# Patient Record
Sex: FEMALE | Race: WHITE | Employment: OTHER | URBAN - NONMETROPOLITAN AREA
[De-identification: names, ages, dates, MRNs, and addresses within clinical notes are randomized per-mention and may not be internally consistent; named-entity substitution may affect disease eponyms.]

---

## 2021-08-26 ENCOUNTER — OFFICE VISIT (OUTPATIENT)
Dept: PRIMARY CARE CLINIC | Age: 61
End: 2021-08-26
Payer: COMMERCIAL

## 2021-08-26 DIAGNOSIS — M25.561 ACUTE PAIN OF RIGHT KNEE: Primary | ICD-10-CM

## 2021-08-26 PROCEDURE — 73562 X-RAY EXAM OF KNEE 3: CPT | Performed by: STUDENT IN AN ORGANIZED HEALTH CARE EDUCATION/TRAINING PROGRAM

## 2021-08-26 PROCEDURE — 99204 OFFICE O/P NEW MOD 45 MIN: CPT | Performed by: STUDENT IN AN ORGANIZED HEALTH CARE EDUCATION/TRAINING PROGRAM

## 2021-08-26 RX ORDER — METHYLPREDNISOLONE 4 MG/1
TABLET ORAL
Qty: 1 KIT | Refills: 0 | Status: SHIPPED | OUTPATIENT
Start: 2021-08-26 | End: 2021-09-01

## 2021-08-26 NOTE — PROGRESS NOTES
No chief complaint on file. HPI:      Kathy Marie is a 64 y.o. female who presents for evaluation of acute right knee pain. Her pain started on 8/15/2021 when she was getting out of her recliner. She pushed down the recliner with her right leg and developed a sudden sharp pain behind the knee. The pain was present with walking but didn't really get better or worse until Monday when she walked up the steps. It got particularly bad yesterday and she attributes this to standing all day on Tuesday jose j vegetables. Over the last couple days the knee itself is become very swollen and just starting today it is hurting her enough that she has had a hard time walking and is using a wheelchair in clinic. She has tried ice, ibuprofen, Voltaren gel - none of which have helped. She has never had pain like this before and she denies a history of gout. She denies shortness of breath or chest pain or peripheral edema noted the swelling is exclusively around the knee. No past medical history on file. Medication  Current Outpatient Medications   Medication Sig Dispense Refill    methylPREDNISolone (MEDROL DOSEPACK) 4 MG tablet Take by mouth. 1 kit 0    amLODIPine (NORVASC) 5 MG tablet       valsartan (DIOVAN) 160 MG tablet        No current facility-administered medications for this visit. Allergies  No Known Allergies    Review of Systems:  Pertinent items are noted in HPI. Physical Examination: This is a pleasant female, alert, and in no acute distress. There were no vitals filed for this visit. Right knee exam: Medial joint line tenderness as well as posterior knee pain. Moderate effusion present without erythema or breaks in the skin. Limping when walking using wheelchair today. Anterior knee pain with resisted extension but negative leg test.  Posterior pain over the hamstrings with resisted knee flexion. Pain was also worse when attempting to straighten the leg.   Lacks about 20° of knee extension and 45° of flexion. Right knee does not appear to be warmer than left knee. No peripheral edema. Right lower extremity is smaller in circumference than left. Vascular exam: Extremities warm and well perfused, no significant edema    Respiratory exam: Breathing easy and unlabored    Neuro exam: No focal neuro deficits    Lymphatic: No obvious lymphadenopathy    Skin: Warm, dry    Radiology:     Six view X-rays of the right knee dated 8/26/2021 were reviewed independently today and discussed with the patient. The films revealed: No acute osseous abnormalities. Some mild to moderate anterior compartment osteoarthritis, mild osteoarthritis elsewhere. Assessment and Plan:     1. Acute pain of right knee  Acute knee pain without previous chronic knee pain. Pain started proximally 1 week ago but then suddenly worsened again a couple of days ago. While her symptoms did initially sound like a hamstring injury her symptoms now seem more consistent with a meniscus tear. She has a lot of joint and is unable to bear weight on her right leg without significant pain. In addition she has a positive Roe and joint line tenderness and a joint effusion which suggests intra-articular pathology specifically a meniscus tear. Because of this I will get an MRI to evaluate and determine the extent of the tear. To help control her pain in the meantime I will start her on a Medrol Dosepak to help reduce the inflammation and pain. We discussed the potential side effects of steroids including hyperactivity, gastritis, and what to look out for including things as extreme as GI bleeding.  - XR KNEE RIGHT (MIN 4 VIEWS)  - methylPREDNISolone (MEDROL DOSEPACK) 4 MG tablet; Take by mouth. Dispense: 1 kit; Refill: 0  - MRI KNEE RIGHT WO CONTRAST; Future    Follow-up: After MRI. Sooner with any problems, questions, concerns, or worsening symptoms.     Mi Pruitt MD  Primary Care Sports Medicine    Please note that this chart was at least partially generated using dragon dictation software. Although every effort was made to ensure the accuracy of this automated transcription, some errors in transcription may have occurred.

## 2021-09-07 ENCOUNTER — OFFICE VISIT (OUTPATIENT)
Dept: PRIMARY CARE CLINIC | Age: 61
End: 2021-09-07
Payer: COMMERCIAL

## 2021-09-07 VITALS — HEIGHT: 69 IN | BODY MASS INDEX: 34.07 KG/M2 | WEIGHT: 230 LBS

## 2021-09-07 DIAGNOSIS — S83.241D ACUTE MEDIAL MENISCUS TEAR OF RIGHT KNEE, SUBSEQUENT ENCOUNTER: Primary | ICD-10-CM

## 2021-09-07 PROCEDURE — 99214 OFFICE O/P EST MOD 30 MIN: CPT | Performed by: STUDENT IN AN ORGANIZED HEALTH CARE EDUCATION/TRAINING PROGRAM

## 2021-09-07 NOTE — PROGRESS NOTES
Chief Complaint   Patient presents with    Follow-up     MRI done on 09/01       HPI:      Maricarmen Saxena is a 64 y.o. female who presents for follow-up evaluation of right knee pain. Her knee pain initially started 8/15/2021 and at our last visit about a week ago she was having severe pain so we gave her a steroid Dosepak and due to concern for a meniscus tear we obtained an MRI which she comes in to discuss today. Overall she says her pain is quite a bit better than it was before and is down to 3/10 as opposed to the nearly 10/10 it was at her last visit. However, she is having to avoid going up stairs and is easily becoming inflamed and painful and making it difficult for her to perform her daily activities. No past medical history on file. Medication  Current Outpatient Medications   Medication Sig Dispense Refill    amLODIPine (NORVASC) 5 MG tablet       valsartan (DIOVAN) 160 MG tablet        No current facility-administered medications for this visit. Allergies  No Known Allergies    Review of Systems:  Pertinent items are noted in HPI. Physical Examination: This is a pleasant female, alert, and in no acute distress. Vitals:    09/07/21 0902   Weight: 230 lb (104.3 kg)   Height: 5' 9\" (1.753 m)       Right knee exam: Moderate effusion, medial joint line tenderness, positive Roe    Vascular exam: Extremities warm and well perfused, no significant edema    Respiratory exam: Breathing easy and unlabored    Neuro exam: No focal neuro deficits    Lymphatic: No obvious lymphadenopathy    Skin: Warm, dry    Radiology:     MRI of the right knee dated 9/1/2021 were reviewed and interpreted independently today and discussed with the patient. The films revealed: 6 mm radial tear of the medial meniscus involving the posterior root with meniscal extrusion. Grade 3 femorotibial and patellofemoral chondromalacia. 2-3 small osseous bodies within popliteus bursa. Assessment and Plan:     1.  Acute medial meniscus tear of right knee, subsequent encounter  That he has an acute large radial tear of the medial meniscus involving the posterior root with meniscal extrusion. These findings put her at risk for further worsening of her meniscal tear leading to early osteoarthritis without intervention. We discussed these findings together today and the importance of early intervention. I discussed the case with Dr. Sloan Ware and we will get her scheduled with him tomorrow to discuss surgical options. Follow-up: With Dr. Sloan Ware. Sooner with any problems, questions, concerns, or worsening symptoms. Mi Pruitt MD  Primary Care Sports Medicine    Please note that this chart was at least partially generated using dragon dictation software. Although every effort was made to ensure the accuracy of this automated transcription, some errors in transcription may have occurred.

## 2021-09-08 ENCOUNTER — OFFICE VISIT (OUTPATIENT)
Dept: ORTHOPEDIC SURGERY | Age: 61
End: 2021-09-08
Payer: COMMERCIAL

## 2021-09-08 ENCOUNTER — TELEPHONE (OUTPATIENT)
Dept: ORTHOPEDIC SURGERY | Age: 61
End: 2021-09-08

## 2021-09-08 VITALS — HEIGHT: 69 IN | WEIGHT: 230 LBS | BODY MASS INDEX: 34.07 KG/M2

## 2021-09-08 DIAGNOSIS — S83.241A ACUTE MEDIAL MENISCUS TEAR OF RIGHT KNEE, INITIAL ENCOUNTER: Primary | ICD-10-CM

## 2021-09-08 PROCEDURE — G8417 CALC BMI ABV UP PARAM F/U: HCPCS | Performed by: ORTHOPAEDIC SURGERY

## 2021-09-08 PROCEDURE — 99215 OFFICE O/P EST HI 40 MIN: CPT | Performed by: ORTHOPAEDIC SURGERY

## 2021-09-08 PROCEDURE — G8427 DOCREV CUR MEDS BY ELIG CLIN: HCPCS | Performed by: ORTHOPAEDIC SURGERY

## 2021-09-08 PROCEDURE — 3017F COLORECTAL CA SCREEN DOC REV: CPT | Performed by: ORTHOPAEDIC SURGERY

## 2021-09-08 PROCEDURE — L1832 KO ADJ JNT POS R SUP PRE CST: HCPCS | Performed by: ORTHOPAEDIC SURGERY

## 2021-09-08 PROCEDURE — 1036F TOBACCO NON-USER: CPT | Performed by: ORTHOPAEDIC SURGERY

## 2021-09-08 NOTE — PROGRESS NOTES
9/8/2021     Reason for visit:  Right knee pain following injury    History of Present Illness: The patient is a 70-year-old female who presents for evaluation of her right knee. She reports injured herself on 8/15/2021. At the time she was getting out of a recliner chair when she felt a popping sensation deep within the knee. This was followed by significant pain and difficulty bearing weight. Ever since then she has had persistent posterior medial based pain. She has difficulty with standing, walking, stairs. Occasional swelling. Occasional catching sensation. Medical History:  No past medical history on file. No past surgical history on file. No family history on file. Social History     Socioeconomic History    Marital status:      Spouse name: Not on file    Number of children: Not on file    Years of education: Not on file    Highest education level: Not on file   Occupational History    Not on file   Tobacco Use    Smoking status: Never Smoker    Smokeless tobacco: Never Used   Substance and Sexual Activity    Alcohol use: Not on file    Drug use: Not on file    Sexual activity: Not on file   Other Topics Concern    Not on file   Social History Narrative    Not on file     Social Determinants of Health     Financial Resource Strain:     Difficulty of Paying Living Expenses:    Food Insecurity:     Worried About Running Out of Food in the Last Year:     [address] in the Last Year:    Transportation Needs:     Lack of Transportation (Medical):      Lack of Transportation (Non-Medical):    Physical Activity:     Days of Exercise per Week:     Minutes of Exercise per Session:    Stress:     Feeling of Stress :    Social Connections:     Frequency of Communication with Friends and Family:     Frequency of Social Gatherings with Friends and Family:     Attends [employer] Services:     Active Member of Clubs or Organizations:     Attends Club or Organization Meetings:     Marital Status:    Intimate Partner Violence:     Fear of Current or Ex-Partner:     Emotionally Abused:     Physically Abused:     Sexually Abused:       Current Outpatient Medications on File Prior to Visit   Medication Sig Dispense Refill    amLODIPine (NORVASC) 5 MG tablet       valsartan (DIOVAN) 160 MG tablet        No current facility-administered medications on file prior to visit. Allergies   Allergen Reactions    Molds & Smuts         Review of Systems:  Constitutional: Patient is adequately groomed with no evidence of malnutrition  Mental Status: The patient is oriented to time, place and person. The patient's mood and affect are appropriate. Lymphatic: The lymphatic examination bilaterally reveals all areas to be without enlargement or induration. Vascular: Examination reveals no swelling or calf tenderness. Peripheral pulses are palpable and 2+. Neurological: The patient has good coordination. There is no weakness or sensory deficit. Skin:  Head/Neck: inspection reveals no rashes, ulcerations or lesions. Trunk: inspection reveals no rashes, ulcerations or lesions. Objective:  Ht 5' 9\" (1.753 m)   Wt 230 lb (104.3 kg)   BMI 33.97 kg/m²      Physical Exam:  The patient is well-appearing and in no apparent distress  Examination of the right knee   There is a small effusion, no gross deformity or skin changes  Range of motion reveals 0 to 130  neg lachman, negative posterior drawer, no pain or laxity with varus or valgus stress at 0 degrees and 30 degrees of flexion  + medial joint line tenderness  5 out of 5 strength throughout distal muscle groups  Sensation is intact to light touch throughout all distributions  There is no calf swelling or tenderness  Palpable DP pulse, brisk cap refill, 2+ symmetric reflexes    Imaging:  X-rays of the right knee were reviewed. There is no fracture, dislocation, or other abnormality. Joint spaces well-preserved.   MRI of the right knee was reviewed. There is a complete radial tear of the posterior medial meniscus root attachment. 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 Assessment:  Right knee posterior medial meniscus root tear sustained on 8/15/2021    Plan:  I had a very long discussion with the patient. Lowell Wren spent time reviewing the imaging findings.  She has a very complex constellation of findings on imaging. I did tell the patient that meniscus root tears have been recently found to be the equivalent of complete meniscal deficiency.  It does put him at increased risk for accelerated degeneration of the knee.  With that being said treatment options at this point include continued nonoperative management versus surgical intervention.  From a surgical standpoint we could consider right knee arthroscopy and posterior medial meniscus root repair.  Whether or not repair is possible depends on the tissue quality of the meniscus but also the extensive degeneration of the medial compartment.  If she is found to have widespread diffuse advanced degeneration that I would not advocate for repair. Bridger Brown he if she does have mild partial thickness early chondral degeneration then we could certainly proceed with repair in order to prevent further degeneration of the knee.  The patient would be nonweightbearing for a total of 6 weeks following surgery.  We thoroughly discussed the risk and benefits associate the procedure.  The risk were defined as not limited to infection, bleeding, damage to blood vessels or nerves, need for additional surgery.  The patient does understand elects proceed.   Lastly, I did reiterate that the MRI does reveal some chondral thinning however the x-rays do not demonstrate any evidence of joint space narrowing and therefore I think there is a high likelihood that this will be repairable and there was still be chondral surface to preserve with the repair.        Greater than 40 minutes were spent with this encounter. Time spent included evaluating the patient's chart prior to arrival.  Evaluating the patient in the office including history, physical examination, imaging reviewing, and counseling on next steps. Lastly, time was spent discussing orders with my staff as well as providing documentation in the chart. Teresa Lux MD            Orthopaedic Surgery Sports Medicine and [address] [facility] and [address] [facility] Physician [facility] ([address])      Disclaimer: This note was dictated with voice recognition software. Though review and correction are routine, we apologize for any errors.

## 2021-09-08 NOTE — TELEPHONE ENCOUNTER
MRI REPORT IS NEEDED TO BE SCANNED INTO [facility] BEFORE PRECERT FOR SURGERY CAN BE STARTED

## 2021-09-09 ENCOUNTER — TELEPHONE (OUTPATIENT)
Dept: ORTHOPEDIC SURGERY | Age: 61
End: 2021-09-09

## 2021-09-10 NOTE — TELEPHONE ENCOUNTER
APPROVED  CPT: 13788  AUTH: [id number]  DATE RANGE: 9/17/21 TO 12/16/21  INSURANCE: [facility]  LOCATION [facility]  NOTE: DOC SCANNED

## 2021-09-13 ENCOUNTER — HOSPITAL ENCOUNTER (OUTPATIENT)
Dept: PHYSICAL THERAPY | Age: 61
Setting detail: THERAPIES SERIES
Discharge: HOME | End: 2021-09-13
Payer: COMMERCIAL

## 2021-09-13 PROCEDURE — 97162 PT EVAL MOD COMPLEX 30 MIN: CPT

## 2021-09-13 PROCEDURE — 97530 THERAPEUTIC ACTIVITIES: CPT

## 2021-09-13 PROCEDURE — 97110 THERAPEUTIC EXERCISES: CPT

## 2021-09-13 NOTE — FLOWSHEET NOTE
[facility]    [facility] Therapy Treatment Note/ Progress Report:     Date:  2021    Patient Name:  Jade Duarte    :  1960  MRN: [id number]  Medical/Treatment Diagnosis Information:  · Diagnosis: M23.306 Other meniscus derangements, unspecified meniscus, R knee; M25.561 R knee pain  · Treatment Diagnosis: M23.306 Other meniscus derangements, unspecified meniscus, R knee; M25.561 R knee pain  Insurance/Certification information:  PT Insurance Information: [facility]  Physician Information:  Referring Practitioner: Nathaniel Anderson  Plan of care signed (Y/N):     Date of Patient follow up with Physician:      Progress Report: [x]  Yes  []  No     Functional Scale: 71% disability - LEFS ()   Date: 21    Date Range for reporting period:  Beginnin21  Endin days or 10 visits    Progress report due (10 Rx/or 30 days whichever is less):      Recertification due (POC duration/ or 90 days whichever is less): 10/13/21     Visit # Insurance Allowable Auth Needed   1 [facility] []Yes    [x]No     Pain level:  3/10 at start of session. 3/10 at best. 8-9/10 at worst.      SUBJECTIVE:  See eval    OBJECTIVE: See eval   Observation:    Test measurements:      RESTRICTIONS/PRECAUTIONS: High BP. Previous tobacco use (5 per day). Exercises/Interventions:     Therapeutic Exercise Resistance Sets/sec Reps Notes   Gastroc stretch w/strap Long sitting 30s 5    Hamstring stretch Long sitting 30s 5    Ankle pumps Long sitting, supine, or at EOB 1 30    Heelslides w/strap Supine or long sitting 10s 10    Quad sets  10s 10    SLR flexion Supine 1 10 W/PT assist          Patient education.    10 min Purpose, focus, goals, expectations of PT post-op; what to expect immediately after surgery including weight bearing restrictions; HEP exercises immediately post-op                                 Therapeutic Activities       Gait training - [facility] with vinicio rolling walker   10 min                                                     Manual Intervention       Knee mobs/PROM       Tib/Fem Mobs       Patella Mobs       Ankle mobs                     NMR Re-education                                                                          Therapeutic Exercise and NMR EXR  [x] (43054) Provided verbal/tactile cueing for activities related to strengthening, flexibility, endurance, ROM for improvements in LE, proximal hip, and core control with self care, mobility, lifting, ambulation. [x] (28292) Provided verbal/tactile cueing for activities related to improving balance, coordination, kinesthetic sense, posture, motor skill, proprioception  to assist with LE, proximal hip, and core control in self care, mobility, lifting, ambulation and eccentric single leg control.      NMR and Therapeutic Activities:    [x] (23239 or 06187) Provided verbal/tactile cueing for activities related to improving balance, coordination, kinesthetic sense, posture, motor skill, proprioception and motor activation to allow for proper function of core, proximal hip and LE with self care and ADLs  [x] (18924) Gait Re-education- Provided training and instruction to the patient for proper LE, core and proximal hip recruitment and positioning and eccentric body weight control with ambulation re-education including up and down stairs     Home Exercise Program:    [x] (07719) Reviewed/Progressed HEP activities related to strengthening, flexibility, endurance, ROM of core, proximal hip and LE for functional self-care, mobility, lifting and ambulation/stair navigation   [x] (76175)Reviewed/Progressed HEP activities related to improving balance, coordination, kinesthetic sense, posture, motor skill, proprioception of core, proximal hip and LE for self care, mobility, lifting, and ambulation/stair navigation      Manual Treatments:  PROM / STM / Oscillations-Mobs:  G-I, II, III, IV (PA's, Inf., Post.)  [x] (55960) Provided manual therapy to mobilize LE, proximal hip and/or LS spine soft tissue/joints for the purpose of modulating pain, promoting relaxation,  increasing ROM, reducing/eliminating soft tissue swelling/inflammation/restriction, improving soft tissue extensibility and allowing for proper ROM for normal function with self care, mobility, lifting and ambulation. Modalities:      Charges:  Timed Code Treatment Minutes: 32   Total Treatment Minutes: 62       [] EVAL (LOW) 10220 (typically 20 minutes face-to-face)  [x] EVAL (MOD) 04314 (typically 30 minutes face-to-face)  [] EVAL (HIGH) 08578 (typically 45 minutes face-to-face)  [] RE-EVAL     [x] TS(63451) x 1    [] IONTO (42734)  [] NMR (46728) x     [] VASO (82577)  [] Manual (71919) x     [] Other:  [x] TA (31966)x 1    [] Mech Traction (42012)  [] ES(attended) (76782)     [] ES (un) (14850):     GOALS:  Patient stated goal: Be able to do normal activities. [x] Progressing: [] Met: [] Not Met: [] Adjusted    Therapist goals for Patient:   Short Term Goals: To be achieved in: 2 weeks  1. Patient will demonstrate [address] status with rolling walker to ensure compliance with weight bearing restrictions immediately following surgery on 9/17/21. [] Progressing: [x] Met: [] Not Met: [] Adjusted  2. Patient will verbalize understanding with purpose, focus, goals, and expectations immediately post-op and once PT starts immediately following surgery as well as with performance of HEP exercises to be performed immediately following surgery. [] Progressing: [x] Met: [] Not Met: [] Adjusted    Progression Towards Functional goals:  [] Patient is progressing as expected towards functional goals listed. [] Progression is slowed due to complexities listed. [] Progression has been slowed due to co-morbidities.   [x] Plan just implemented, too soon to assess goals progression  [] Other:     ASSESSMENT:  See eval    Return to Play: (if applicable)   []  Stage 1: Intro to Strength   []  Stage 2: Return to Run and Strength   []  Stage 3: Return to Jump and Strength   []  Stage 4: Dynamic Strength and Agility   []  Stage 5: Sport Specific Training     []  Ready to Return to Play, Meets All Above Stages   []  Not Ready for Return to Sports   Comments:            Treatment/Activity Tolerance:  [x] Patient tolerated treatment well [] Patient limited by fatique  [] Patient limited by pain  [] Patient limited by other medical complications  [] Other:     Overall Progression Towards Functional goals/ Treatment Progress Update:  [] Patient is progressing as expected towards functional goals listed. [] Progression is slowed due to complexities/Impairments listed. [] Progression has been slowed due to co-morbidities. [x] Plan just implemented, too soon to assess goals progression <30days   [] Goals require adjustment due to lack of progress  [] Patient is not progressing as expected and requires additional follow up with physician  [] Other    Prognosis for POC: [x] Good [] Fair  [] Poor    Patient requires continued skilled intervention: [x] Yes  [] No        PLAN: See eval  [] Continue per plan of care [] Alter current plan (see comments)  [x] Plan of care initiated [] Hold pending MD visit [] Discharge    Electronically signed by: Jania Womack, PT, DPT, MS, SCS    Note: If patient does not return for scheduled/recommended follow up visits, this note will serve as a discharge from care along with the most recent update on progress.

## 2021-09-13 NOTE — PLAN OF CARE
[facility]                                                       Physical Therapy Certification    Dear Referring Practitioner: Celeste Bains,    We had the pleasure of evaluating the following patient for physical therapy services at [address] [place] [address]. A summary of our findings can be found in the initial assessment below. This includes our plan of care. If you have any questions or concerns regarding these findings, please do not hesitate to contact me at the office phone number checked above. Thank you for the referral.       Physician Signature:_______________________________Date:__________________  By signing above (or electronic signature), therapists plan is approved by physician      Patient: Rachel Capellan   : 1960   MRN: [id number]  Referring Physician: Referring Practitioner: Celeste Bains      Evaluation Date: 2021      Medical Diagnosis Information:  Diagnosis: M23.306 Other meniscus derangements, unspecified meniscus, R knee; M25.561 R knee cliff   Treatment Diagnosis: M23.306 Other meniscus derangements, unspecified meniscus, R knee; M25.561 R knee pain                                         Insurance information: PT Insurance Information: C     Precautions/ Contra-indications: High BP. Previous tobacco user (5 per day). C-SSRS Triggered by Intake questionnaire (Past 2 wk assessment):   [x] No, Questionnaire did not trigger screening.   [] Yes, Patient intake triggered further evaluation      [] C-SSRS Screening completed  [] PCP notified via Plan of Care  [] Emergency services notified     Latex Allergy:  [x]NO      []YES  Preferred Language for Healthcare:   [x]English       []other:    SUBJECTIVE: Patient stated complaint:   Patient is scheduled for a meniscus repair on 21 with Dr. Yulissa Hernandez.  Patient's R knee started hurting on August 15th.  Patient attempted to jump up quickly from her recliner when she heard someone at the front door when she heard/felt a pop in her R knee and pain in the posteromedial R knee. Patient's pain is usually a constant dull achy pain with intermittent sharp pain. Patient's pain did not seem to get worse after that, but it also did not improve at all. Patient then went to get up from the toilet in their bathroom at her home and she felt even worse pain. Patient saw Dr. Sharlene Archer who sent her for an MRI. Dr. Sharlene Archer then referred patient to Dr. Sandor King due to meniscus tear on imaging. Relevant Medical History:  High BP. Previous tobacco user (5 per day). Functional Disability Index:  71% disability - LEFS (23/80)    Pain Scale: 3/10 at start of session. 3/10 at best. 8-9/10 at worst.   Easing factors: Using a SPC to help with pain. Tylenol. Ice. Compression pants. Keeping the R knee elevated. Provocative factors: Walking. Prolonged standing. Getting up from a chair. Getting on/off the toilet. Lifting anything heavy. Type: [x]Constant   []Intermittent  []Radiating [x]Localized []other:     Numbness/Tingling: Denies N/T. Occupation/School:  Patient is a retired . Living Status/Prior Level of Function: Independent with ADLs and IADLs. Patient is currently walking with a SPC to help with pain as needed. She was previously walking independently without an AD. Patient has about 15 stairs in her home, however, the master bedroom and bath are all on the first floor, so she will be able to function pretty independently on the first floor. Patient does have two steps to get into the house. Patient enjoys golfing, but she admits she has not really golfed a whole lot this year.      OBJECTIVE:     ROM LEFT RIGHT   HIP Flex     HIP Abd     HIP Ext     HIP IR     HIP ER     Knee ext +1 Hyper 0   Knee Flex 132 120   Ankle PF     Ankle DF     Ankle In     Ankle Ev     Strength  LEFT RIGHT   HIP Flexors     HIP Abductors 10.1# 11.3#   HIP Ext     Hip ER     Knee EXT (quad) 34.4# 14.9# p! Knee Flex (HS) 23.5# 12.0#   Ankle DF     Ankle PF     Ankle Inv     Ankle EV          Circumference  Mid apex  7 cm prox               Balance:       Standing balance:  See above. Reflexes/Sensation:    [x]Dermatomes/Myotomes intact    [x]Reflexes equal and normal bilaterally   []Other:    Joint mobility:    []Normal    []Hypo   []Hyper    Palpation:     Functional Mobility/Transfers:   30 second sit to stand:  10 Requires UE support bilaterally. P! In R knee throughout. Posture:     Bandages/Dressings/Incisions:     Gait: Patient is ambulating with SPC with antalgic gait on the R, particularly during stance phase on the R; decreased stance phase time on the R; slowed gait. Orthopedic Special Tests:                        [x] Patient history, allergies, meds reviewed. Medical chart reviewed. See intake form. Review Of Systems (ROS):  [x]Performed Review of systems (Integumentary, CardioPulmonary, Neurological) by intake and observation. Intake form has been scanned into medical record. Patient has been instructed to contact their primary care physician regarding ROS issues if not already being addressed at this time.       Co-morbidities/Complexities (which will affect course of rehabilitation):   []None           Arthritic conditions   []Rheumatoid arthritis (M05.9)  []Osteoarthritis (M19.91)   Cardiovascular conditions   [x]Hypertension (I10)  []Hyperlipidemia (E78.5)  []Angina pectoris (I20)  []Atherosclerosis (I70)   Musculoskeletal conditions   []Disc pathology   []Congenital spine pathologies   []Prior surgical intervention  []Osteoporosis (M81.8)  []Osteopenia (M85.8)   Endocrine conditions   []Hypothyroid (E03.9)  []Hyperthyroid Gastrointestinal conditions   []Constipation (O42.41)   Metabolic conditions   []Morbid obesity (E66.01)  []Diabetes type 1(E10.65) or 2 (E11.65)   []Neuropathy (G60.9) Pulmonary conditions   []Asthma (J45)  []Coughing   []COPD (J44.9)   Psychological Disorders  []Anxiety (F41.9)  []Depression (F32.9)   []Other:   []Other:          Barriers to/and or personal factors that will affect rehab potential:              [x]Age  []Sex              []Motivation/Lack of Motivation                        []Co-Morbidities              []Cognitive Function, education/learning barriers              []Environmental, home barriers              []profession/work barriers  []past PT/medical experience  []other:  Justification: Increasing age indicates increased likelihood for prolonged prognosis for full, safe return to PLOF. Falls Risk Assessment (30 days):   [x] Falls Risk assessed and no intervention required. [] Falls Risk assessed and Patient requires intervention due to being higher risk   TUG score (>12s at risk):     [] Falls education provided, including         ASSESSMENT: Patient is a 65 y/o F who reports to PT for one prehab session approximately 5 days prior to R meniscus repair on 9/17/21. Session today consisted of pre-op ROM, strength, and functional testing as well as gait training to educate patient on how to be compliant with NWBing restrictions using RW following surgery. Also discussed exercises to be performed as part of HEP immediately following HEP. Will follow up with patient for re-eval after surgery performed.      Functional Impairments:     [x]Noted lumbar/proximal hip/LE joint hypomobility   [x]Decreased LE functional ROM   [x]Decreased core/proximal hip strength and neuromuscular control   [x]Decreased LE functional strength   [x]Reduced balance/proprioceptive control   []other:      Functional Activity Limitations (from functional questionnaire and intake)   [x]Reduced ability to tolerate prolonged functional positions   [x]Reduced ability or difficulty with changes of positions or transfers between positions   [x]Reduced ability to maintain good posture and demonstrate good body mechanics with sitting, bending, and lifting   []Reduced ability to sleep   [] Reduced ability or tolerance with driving and/or computer work   [x]Reduced ability to perform lifting, carrying tasks   [x]Reduced ability to squat   []Reduced ability to forward bend   [x]Reduced ability to ambulate prolonged functional periods/distances/surfaces   [x]Reduced ability to ascend/descend stairs   []Reduced ability to run, hop, cut or jump   []other:    Participation Restrictions   [x]Reduced participation in self care activities   [x]Reduced participation in home management activities   []Reduced participation in work activities   [x]Reduced participation in social activities. [x]Reduced participation in sport/recreation activities. Classification :    []Signs/symptoms consistent with post-surgical status including decreased ROM, strength and function.    []Signs/symptoms consistent with joint sprain/strain   []Signs/symptoms consistent with patella-femoral syndrome   []Signs/symptoms consistent with knee OA/hip OA   [x]Signs/symptoms consistent with internal derangement of knee/Hip   [x]Signs/symptoms consistent with functional hip weakness/NMR control      []Signs/symptoms consistent with tendinitis/tendinosis    []signs/symptoms consistent with pathology which may benefit from Dry needling      []other:      Prognosis/Rehab Potential:      []Excellent   [x]Good    []Fair   []Poor    Tolerance of evaluation/treatment:    []Excellent   [x]Good    []Fair   []Poor    Physical Therapy Evaluation Complexity Justification  [] A history of present problem with:  [] no personal factors and/or comorbidities that impact the plan of care;  [x]1-2 personal factors and/or comorbidities that impact the plan of care  []3 personal factors and/or comorbidities that impact the plan of care  [] An examination of body systems using standardized tests and measures addressing any of the following: body structures and functions (impairments), activity limitations, and/or participation restrictions;:  [] a total of 1-2 or more elements   [x] a total of 3 or more elements   [] a total of 4 or more elements   [] A clinical presentation with:  [] stable and/or uncomplicated characteristics   [x] evolving clinical presentation with changing characteristics  [] unstable and unpredictable characteristics;   [] Clinical decision making of [] low, [x] moderate, [] high complexity using standardized patient assessment instrument and/or measurable assessment of functional outcome. [] EVAL (LOW) 13634 (typically 30 minutes face-to-face)  [x] EVAL (MOD) 92965 (typically 30 minutes face-to-face)  [] EVAL (HIGH) 57949 (typically 45 minutes face-to-face)  [] RE-EVAL       Frequency/Duration:  2 days per week for 12 Weeks:  Interventions:  [x]  Therapeutic exercise including: strength training, ROM, for Lower extremity and core   [x]  NMR activation and proprioception for LE, Glutes and Core   [x]  Manual therapy as indicated for LE, Hip and spine to include: Dry Needling/IASTM, STM, PROM, Gr I-IV mobilizations, manipulation. [x] Modalities as needed that may include: thermal agents, E-stim, Biofeedback, US, iontophoresis as indicated  [x] Patient education on joint protection, postural re-education, activity modification, progression of HEP. GOALS:  Patient stated goal: Be able to do normal activities. [x] Progressing: [] Met: [] Not Met: [] Adjusted    Therapist goals for Patient:   Short Term Goals: To be achieved in: 2 weeks  1. Patient will demonstrate [address] status with rolling walker to ensure compliance with weight bearing restrictions immediately following surgery on 9/17/21. [] Progressing: [x] Met: [] Not Met: [] Adjusted  2.  Patient will verbalize understanding with purpose, focus, goals, and expectations immediately post-op and once PT starts immediately following surgery as well as with performance of HEP exercises to be performed immediately following surgery.   [] Progressing: [x] Met: [] Not Met: [] Adjusted       Electronically signed by:  Marbella Kirkland, PT, DPT, MS, SCS

## 2021-09-14 ENCOUNTER — TELEPHONE (OUTPATIENT)
Dept: ORTHOPEDIC SURGERY | Age: 61
End: 2021-09-14

## 2021-09-14 NOTE — TELEPHONE ENCOUNTER
H&P scanned into media    Covid test completed yesterday at Dr. Bronson Andrews. EKG completed today 9/14.

## 2021-09-15 ENCOUNTER — TELEPHONE (OUTPATIENT)
Dept: ORTHOPEDIC SURGERY | Age: 61
End: 2021-09-15

## 2021-09-15 DIAGNOSIS — G89.18 POST-OP PAIN: Primary | ICD-10-CM

## 2021-09-15 RX ORDER — CETIRIZINE HYDROCHLORIDE 10 MG/1
10 TABLET ORAL DAILY
COMMUNITY

## 2021-09-15 RX ORDER — MULTIVIT-MIN/IRON/FOLIC ACID/K 18-600-40
500 CAPSULE ORAL DAILY
COMMUNITY

## 2021-09-15 RX ORDER — ACETAMINOPHEN 160 MG
TABLET,DISINTEGRATING ORAL
COMMUNITY

## 2021-09-15 RX ORDER — CHLORAL HYDRATE 500 MG
3000 CAPSULE ORAL DAILY
COMMUNITY

## 2021-09-15 RX ORDER — VALSARTAN AND HYDROCHLOROTHIAZIDE 320; 25 MG/1; MG/1
1 TABLET, FILM COATED ORAL DAILY
COMMUNITY

## 2021-09-15 RX ORDER — VITAMIN E 268 MG
400 CAPSULE ORAL DAILY
COMMUNITY

## 2021-09-15 RX ORDER — TRIAMCINOLONE ACETONIDE 1 MG/G
CREAM TOPICAL 2 TIMES DAILY
COMMUNITY
End: 2021-12-14 | Stop reason: ALTCHOICE

## 2021-09-15 RX ORDER — ASPIRIN 81 MG/1
81 TABLET ORAL DAILY
COMMUNITY

## 2021-09-15 RX ORDER — HYDROXYZINE HYDROCHLORIDE 25 MG/1
25 TABLET, FILM COATED ORAL EVERY 8 HOURS PRN
COMMUNITY

## 2021-09-15 NOTE — PROGRESS NOTES
Name_______________________________________Printed:____________________  Date and time of surgery_9/17/2021____1230___________________Arrival Time:_1100  masc_______________   1. The instructions given regarding when and if a patient needs to stop oral intake prior to surgery varies. Follow the specific instructions you were given                  _x__Nothing to eat or to drink after Midnight the night before.                   ____Carbo loading or ERAS instructions will be given to select patients-if you have been given those instructions -please do the following                           The evening before your surgery after dinner before midnight drink 40 ounces of gatorade. If you are diabetic use sugar free. The morning of surgery drink 40 ounces of water. This needs to be finished 3 hours prior to your surgery start time. 2. Take the following pills with a small sip of water on the morning of surgery__amlodipine_________________________________________________                  Do not take blood pressure medications ending in pril or sartan the mehdi prior to surgery or the morning of surgery_   3. Aspirin, Ibuprofen, Advil, Naproxen, Vitamin E and other Anti-inflammatory products and supplements should be stopped for 5 -7days before surgery or as directed by your physician. 4. Check with your Doctor regarding stopping Plavix, Coumadin,Eliquis, Lovenox,Effient,Pradaxa,Xarelto, Fragmin or other blood thinners and follow their instructions. 5. Do not smoke, and do not drink any alcoholic beverages 24 hours prior to surgery. This includes NA Beer. Refrain from the usage of any recreational drugs. 6. You may brush your teeth and gargle the morning of surgery. DO NOT SWALLOW WATER   7. You MUST make arrangements for a responsible adult to stay on site while you are here and take you home after your surgery. You will not be allowed to leave alone or drive yourself home.   It is strongly suggested someone stay with you the first 24 hrs. Your surgery will be cancelled if you do not have a ride home. 8. A parent/legal guardian must accompany a child scheduled for surgery and plan to stay at the hospital until the child is discharged. Please do not bring other children with you. 9. Please wear simple, loose fitting clothing to the hospital.  Yana Hawkins not bring valuables (money, credit cards, checkbooks, etc.) Do not wear any makeup (including no eye makeup) or nail polish on your fingers or toes. 10. DO NOT wear any jewelry or piercings on day of surgery. All body piercing jewelry must be removed. 11. If you have ___dentures, they will be removed before going to the OR; we will provide you a container. If you wear ___contact lenses or ___glasses, they will be removed; please bring a case for them. 12. Please see your family doctor/pediatrician for a history & physical and/or concerning medications. Bring any test results/reports from your physician's office. PCP__________________Phone___________H&P Appt. Date________             13 If you  have a Living Will and Durable Power of  for Healthcare, please bring in a copy. 15. Notify your Surgeon if you develop any illness between now and surgery  time, cough, cold, fever, sore throat, nausea, vomiting, etc.  Please notify your surgeon if you experience dizziness, shortness of breath or blurred vision between now & the time of your surgery             15. DO NOT shave your operative site 96 hours prior to surgery. For face & neck surgery, men may use an electric razor 48 hours prior to surgery. 16. Shower the night before or morning of surgery using an antibacterial soap or as you have been instructed. 17. To provide excellent care visitors will be limited to one in the room at any given time. 18.  Please bring picture ID and insurance card.              19.  Visit our web site for additional information:  [email or web address]              20.During flu season no children under the age of 15 are permitted in the hospital for the safety of all patients. 21. If you take a long acting insulin in the evening only  take half of your usual  dose the night  before your procedure              22. If you use a c-pap please bring DOS if staying overnight,             23.For your convenience [facility] has a pharmacy on site to fill your prescriptions. 24. If you use oxygen and have a portable tank please bring it  with you the DOS             25. Bring a complete list of all your medications with name and dose include any supplements. 26. Other__________________________________________   *Please call pre admission testing if you any further questions   [address] [place]  [phone]   [facility]    ___ Vaccinated-patient instructed to bring card    ___ Covid test to be done 3-5 days prior to scheduled surgery if not vaccinated-patient aware they are REQUIRED to bring a copy of the negative result DOS-if they receive a positive result to notify their surgeon         If known - indicate where patient is getting covid test done ___________________________________________________________    ___ [facility] - DOS    _x__ Other__Covid 9/13/2021 @  [facility] office, will bring neg results_________________________________      Clois Pillion POLICY(subject to change)    There is a one visitor policy at [facility] for all surgeries and endoscopies. Whether the visitor can stay or will be asked to wait in the car will depend on the current policy and if social distancing can be maintained. The policy is subject to change at any time. Please make sure the visitor has a cell phone that is on,charged and able to accept calls, as this may be the way that the staff communicates with them. Pain management is NO VISITOR policyThe patients ride is expected to remain in the car with a cell phone for communication. If the ride is leaving the hospital grounds please make sure they are back in time for pickup. Have the patient inform the staff on arrival what their rides plans are while the patient is in the facility. At the MAIN there is one visitor allowed. Please note that the visitor policy is subject to change. All above information reviewed with patient in person or by phone. Patient verbalizes understanding. All questions and concerns addressed.                                                                                                  Patient/Rep_patient___________________                                                                                                                                    PRE OP INSTRUCTIONS

## 2021-09-16 RX ORDER — HYDROCODONE BITARTRATE AND ACETAMINOPHEN 5; 325 MG/1; MG/1
1 TABLET ORAL EVERY 4 HOURS PRN
Qty: 30 TABLET | Refills: 0 | Status: SHIPPED | OUTPATIENT
Start: 2021-09-16 | End: 2021-09-23

## 2021-09-16 RX ORDER — ONDANSETRON 4 MG/1
4 TABLET, FILM COATED ORAL EVERY 8 HOURS PRN
Qty: 21 TABLET | Refills: 0 | Status: SHIPPED | OUTPATIENT
Start: 2021-09-16 | End: 2021-09-23

## 2021-09-16 RX ORDER — ASPIRIN 325 MG
325 TABLET, DELAYED RELEASE (ENTERIC COATED) ORAL DAILY
Qty: 14 TABLET | Refills: 0 | Status: ON HOLD | OUTPATIENT
Start: 2021-09-16 | End: 2021-09-17

## 2021-09-17 ENCOUNTER — ANESTHESIA (OUTPATIENT)
Dept: OPERATING ROOM | Age: 61
End: 2021-09-17
Payer: COMMERCIAL

## 2021-09-17 ENCOUNTER — HOSPITAL ENCOUNTER (OUTPATIENT)
Age: 61
Setting detail: OUTPATIENT SURGERY
Discharge: HOME | End: 2021-09-17
Attending: ORTHOPAEDIC SURGERY | Admitting: ORTHOPAEDIC SURGERY
Payer: COMMERCIAL

## 2021-09-17 ENCOUNTER — ANESTHESIA EVENT (OUTPATIENT)
Dept: OPERATING ROOM | Age: 61
End: 2021-09-17
Payer: COMMERCIAL

## 2021-09-17 VITALS
OXYGEN SATURATION: 98 % | DIASTOLIC BLOOD PRESSURE: 65 MMHG | TEMPERATURE: 96.6 F | RESPIRATION RATE: 1 BRPM | SYSTOLIC BLOOD PRESSURE: 112 MMHG

## 2021-09-17 VITALS
WEIGHT: 245 LBS | HEIGHT: 69 IN | SYSTOLIC BLOOD PRESSURE: 147 MMHG | BODY MASS INDEX: 36.29 KG/M2 | OXYGEN SATURATION: 89 % | HEART RATE: 91 BPM | TEMPERATURE: 98 F | DIASTOLIC BLOOD PRESSURE: 105 MMHG | RESPIRATION RATE: 17 BRPM

## 2021-09-17 PROCEDURE — 7100000010 HC PHASE II RECOVERY - FIRST 15 MIN: Performed by: ORTHOPAEDIC SURGERY

## 2021-09-17 PROCEDURE — 2500000003 HC RX 250 WO HCPCS: Performed by: NURSE ANESTHETIST, CERTIFIED REGISTERED

## 2021-09-17 PROCEDURE — 2709999900 HC NON-CHARGEABLE SUPPLY: Performed by: ORTHOPAEDIC SURGERY

## 2021-09-17 PROCEDURE — 6360000002 HC RX W HCPCS: Performed by: ORTHOPAEDIC SURGERY

## 2021-09-17 PROCEDURE — 7100000000 HC PACU RECOVERY - FIRST 15 MIN: Performed by: ORTHOPAEDIC SURGERY

## 2021-09-17 PROCEDURE — 2580000003 HC RX 258: Performed by: ORTHOPAEDIC SURGERY

## 2021-09-17 PROCEDURE — 2500000003 HC RX 250 WO HCPCS: Performed by: ORTHOPAEDIC SURGERY

## 2021-09-17 PROCEDURE — 2720000010 HC SURG SUPPLY STERILE: Performed by: ORTHOPAEDIC SURGERY

## 2021-09-17 PROCEDURE — 6360000002 HC RX W HCPCS: Performed by: FAMILY MEDICINE

## 2021-09-17 PROCEDURE — 3600000014 HC SURGERY LEVEL 4 ADDTL 15MIN: Performed by: ORTHOPAEDIC SURGERY

## 2021-09-17 PROCEDURE — 3600000004 HC SURGERY LEVEL 4 BASE: Performed by: ORTHOPAEDIC SURGERY

## 2021-09-17 PROCEDURE — 3700000001 HC ADD 15 MINUTES (ANESTHESIA): Performed by: ORTHOPAEDIC SURGERY

## 2021-09-17 PROCEDURE — 7100000011 HC PHASE II RECOVERY - ADDTL 15 MIN: Performed by: ORTHOPAEDIC SURGERY

## 2021-09-17 PROCEDURE — 3700000000 HC ANESTHESIA ATTENDED CARE: Performed by: ORTHOPAEDIC SURGERY

## 2021-09-17 PROCEDURE — 6360000002 HC RX W HCPCS: Performed by: NURSE ANESTHETIST, CERTIFIED REGISTERED

## 2021-09-17 PROCEDURE — 7100000001 HC PACU RECOVERY - ADDTL 15 MIN: Performed by: ORTHOPAEDIC SURGERY

## 2021-09-17 PROCEDURE — C1713 ANCHOR/SCREW BN/BN,TIS/BN: HCPCS | Performed by: ORTHOPAEDIC SURGERY

## 2021-09-17 PROCEDURE — 6370000000 HC RX 637 (ALT 250 FOR IP): Performed by: FAMILY MEDICINE

## 2021-09-17 DEVICE — BUTTON SUT DIA12MM TI FOR PRI BKUP FIBERWIRE FIX OF ACL PCL: Type: IMPLANTABLE DEVICE | Site: KNEE | Status: FUNCTIONAL

## 2021-09-17 RX ORDER — OXYCODONE HYDROCHLORIDE 5 MG/1
5 TABLET ORAL PRN
Status: COMPLETED | OUTPATIENT
Start: 2021-09-17 | End: 2021-09-17

## 2021-09-17 RX ORDER — DEXAMETHASONE SODIUM PHOSPHATE 4 MG/ML
INJECTION, SOLUTION INTRA-ARTICULAR; INTRALESIONAL; INTRAMUSCULAR; INTRAVENOUS; SOFT TISSUE PRN
Status: DISCONTINUED | OUTPATIENT
Start: 2021-09-17 | End: 2021-09-17 | Stop reason: SDUPTHER

## 2021-09-17 RX ORDER — LABETALOL HYDROCHLORIDE 5 MG/ML
5 INJECTION, SOLUTION INTRAVENOUS EVERY 10 MIN PRN
Status: DISCONTINUED | OUTPATIENT
Start: 2021-09-17 | End: 2021-09-17 | Stop reason: HOSPADM

## 2021-09-17 RX ORDER — KETAMINE HCL IN NACL, ISO-OSM 100MG/10ML
SYRINGE (ML) INJECTION PRN
Status: DISCONTINUED | OUTPATIENT
Start: 2021-09-17 | End: 2021-09-17 | Stop reason: SDUPTHER

## 2021-09-17 RX ORDER — LIDOCAINE HYDROCHLORIDE 20 MG/ML
INJECTION, SOLUTION EPIDURAL; INFILTRATION; INTRACAUDAL; PERINEURAL PRN
Status: DISCONTINUED | OUTPATIENT
Start: 2021-09-17 | End: 2021-09-17 | Stop reason: SDUPTHER

## 2021-09-17 RX ORDER — OXYCODONE HYDROCHLORIDE 5 MG/1
10 TABLET ORAL PRN
Status: COMPLETED | OUTPATIENT
Start: 2021-09-17 | End: 2021-09-17

## 2021-09-17 RX ORDER — LIDOCAINE HYDROCHLORIDE 10 MG/ML
0.5 INJECTION, SOLUTION EPIDURAL; INFILTRATION; INTRACAUDAL; PERINEURAL ONCE
Status: DISCONTINUED | OUTPATIENT
Start: 2021-09-17 | End: 2021-09-17 | Stop reason: HOSPADM

## 2021-09-17 RX ORDER — PROPOFOL 10 MG/ML
INJECTION, EMULSION INTRAVENOUS PRN
Status: DISCONTINUED | OUTPATIENT
Start: 2021-09-17 | End: 2021-09-17 | Stop reason: SDUPTHER

## 2021-09-17 RX ORDER — SODIUM CHLORIDE, SODIUM LACTATE, POTASSIUM CHLORIDE, CALCIUM CHLORIDE 600; 310; 30; 20 MG/100ML; MG/100ML; MG/100ML; MG/100ML
INJECTION, SOLUTION INTRAVENOUS CONTINUOUS
Status: DISCONTINUED | OUTPATIENT
Start: 2021-09-17 | End: 2021-09-17 | Stop reason: HOSPADM

## 2021-09-17 RX ORDER — PROMETHAZINE HYDROCHLORIDE 25 MG/ML
6.25 INJECTION, SOLUTION INTRAMUSCULAR; INTRAVENOUS PRN
Status: DISCONTINUED | OUTPATIENT
Start: 2021-09-17 | End: 2021-09-17 | Stop reason: HOSPADM

## 2021-09-17 RX ORDER — HYDROMORPHONE HCL 110MG/55ML
0.25 PATIENT CONTROLLED ANALGESIA SYRINGE INTRAVENOUS EVERY 5 MIN PRN
Status: DISCONTINUED | OUTPATIENT
Start: 2021-09-17 | End: 2021-09-17 | Stop reason: HOSPADM

## 2021-09-17 RX ORDER — HYDROMORPHONE HCL 110MG/55ML
0.5 PATIENT CONTROLLED ANALGESIA SYRINGE INTRAVENOUS EVERY 5 MIN PRN
Status: DISCONTINUED | OUTPATIENT
Start: 2021-09-17 | End: 2021-09-17 | Stop reason: HOSPADM

## 2021-09-17 RX ORDER — MEPERIDINE HYDROCHLORIDE 25 MG/ML
12.5 INJECTION INTRAMUSCULAR; INTRAVENOUS; SUBCUTANEOUS EVERY 5 MIN PRN
Status: DISCONTINUED | OUTPATIENT
Start: 2021-09-17 | End: 2021-09-17 | Stop reason: HOSPADM

## 2021-09-17 RX ORDER — DIPHENHYDRAMINE HYDROCHLORIDE 50 MG/ML
12.5 INJECTION INTRAMUSCULAR; INTRAVENOUS
Status: COMPLETED | OUTPATIENT
Start: 2021-09-17 | End: 2021-09-17

## 2021-09-17 RX ORDER — HYDROMORPHONE HCL 110MG/55ML
PATIENT CONTROLLED ANALGESIA SYRINGE INTRAVENOUS PRN
Status: DISCONTINUED | OUTPATIENT
Start: 2021-09-17 | End: 2021-09-17 | Stop reason: SDUPTHER

## 2021-09-17 RX ORDER — FENTANYL CITRATE 50 UG/ML
50 INJECTION, SOLUTION INTRAMUSCULAR; INTRAVENOUS EVERY 5 MIN PRN
Status: DISCONTINUED | OUTPATIENT
Start: 2021-09-17 | End: 2021-09-17 | Stop reason: HOSPADM

## 2021-09-17 RX ORDER — GLYCOPYRROLATE 1 MG/5 ML
SYRINGE (ML) INTRAVENOUS PRN
Status: DISCONTINUED | OUTPATIENT
Start: 2021-09-17 | End: 2021-09-17 | Stop reason: SDUPTHER

## 2021-09-17 RX ORDER — ONDANSETRON 2 MG/ML
INJECTION INTRAMUSCULAR; INTRAVENOUS PRN
Status: DISCONTINUED | OUTPATIENT
Start: 2021-09-17 | End: 2021-09-17 | Stop reason: SDUPTHER

## 2021-09-17 RX ORDER — FENTANYL CITRATE 50 UG/ML
INJECTION, SOLUTION INTRAMUSCULAR; INTRAVENOUS PRN
Status: DISCONTINUED | OUTPATIENT
Start: 2021-09-17 | End: 2021-09-17 | Stop reason: SDUPTHER

## 2021-09-17 RX ADMIN — SODIUM CHLORIDE, POTASSIUM CHLORIDE, SODIUM LACTATE AND CALCIUM CHLORIDE: 600; 310; 30; 20 INJECTION, SOLUTION INTRAVENOUS at 12:47

## 2021-09-17 RX ADMIN — SODIUM CHLORIDE, POTASSIUM CHLORIDE, SODIUM LACTATE AND CALCIUM CHLORIDE: 600; 310; 30; 20 INJECTION, SOLUTION INTRAVENOUS at 11:45

## 2021-09-17 RX ADMIN — OXYCODONE 5 MG: 5 TABLET ORAL at 15:25

## 2021-09-17 RX ADMIN — Medication 10 MG: at 13:12

## 2021-09-17 RX ADMIN — PROPOFOL 300 MG: 10 INJECTION, EMULSION INTRAVENOUS at 12:51

## 2021-09-17 RX ADMIN — DEXAMETHASONE SODIUM PHOSPHATE 10 MG: 4 INJECTION, SOLUTION INTRAMUSCULAR; INTRAVENOUS at 12:57

## 2021-09-17 RX ADMIN — Medication 0.1 MG: at 12:57

## 2021-09-17 RX ADMIN — SODIUM CHLORIDE, POTASSIUM CHLORIDE, SODIUM LACTATE AND CALCIUM CHLORIDE: 600; 310; 30; 20 INJECTION, SOLUTION INTRAVENOUS at 13:49

## 2021-09-17 RX ADMIN — TRANEXAMIC ACID 1000 MG: 1 INJECTION, SOLUTION INTRAVENOUS at 12:02

## 2021-09-17 RX ADMIN — Medication 20 MG: at 13:09

## 2021-09-17 RX ADMIN — HYDROMORPHONE HYDROCHLORIDE 0.5 MG: 2 INJECTION, SOLUTION INTRAMUSCULAR; INTRAVENOUS; SUBCUTANEOUS at 13:18

## 2021-09-17 RX ADMIN — ONDANSETRON 4 MG: 2 INJECTION INTRAMUSCULAR; INTRAVENOUS at 12:57

## 2021-09-17 RX ADMIN — FENTANYL CITRATE 50 MCG: 50 INJECTION, SOLUTION INTRAMUSCULAR; INTRAVENOUS at 12:51

## 2021-09-17 RX ADMIN — LIDOCAINE HYDROCHLORIDE 100 MG: 20 INJECTION, SOLUTION EPIDURAL; INFILTRATION; INTRACAUDAL; PERINEURAL at 12:51

## 2021-09-17 RX ADMIN — HYDROMORPHONE HYDROCHLORIDE 0.5 MG: 2 INJECTION, SOLUTION INTRAMUSCULAR; INTRAVENOUS; SUBCUTANEOUS at 14:41

## 2021-09-17 RX ADMIN — DIPHENHYDRAMINE HYDROCHLORIDE 12.5 MG: 50 INJECTION, SOLUTION INTRAMUSCULAR; INTRAVENOUS at 14:44

## 2021-09-17 RX ADMIN — Medication 0.1 MG: at 13:09

## 2021-09-17 RX ADMIN — HYDROMORPHONE HYDROCHLORIDE 0.5 MG: 2 INJECTION, SOLUTION INTRAMUSCULAR; INTRAVENOUS; SUBCUTANEOUS at 13:53

## 2021-09-17 RX ADMIN — FENTANYL CITRATE 50 MCG: 50 INJECTION, SOLUTION INTRAMUSCULAR; INTRAVENOUS at 13:01

## 2021-09-17 RX ADMIN — CEFAZOLIN SODIUM 2000 MG: 10 INJECTION, POWDER, FOR SOLUTION INTRAVENOUS at 12:38

## 2021-09-17 RX ADMIN — HYDROMORPHONE HYDROCHLORIDE 0.5 MG: 2 INJECTION, SOLUTION INTRAMUSCULAR; INTRAVENOUS; SUBCUTANEOUS at 14:51

## 2021-09-17 ASSESSMENT — PULMONARY FUNCTION TESTS
PIF_VALUE: 7
PIF_VALUE: 17
PIF_VALUE: 9
PIF_VALUE: 12
PIF_VALUE: 12
PIF_VALUE: 10
PIF_VALUE: 9
PIF_VALUE: 13
PIF_VALUE: 4
PIF_VALUE: 2
PIF_VALUE: 7
PIF_VALUE: 4
PIF_VALUE: 9
PIF_VALUE: 9
PIF_VALUE: 7
PIF_VALUE: 10
PIF_VALUE: 19
PIF_VALUE: 7
PIF_VALUE: 1
PIF_VALUE: 7
PIF_VALUE: 4
PIF_VALUE: 5
PIF_VALUE: 10
PIF_VALUE: 9
PIF_VALUE: 6
PIF_VALUE: 0
PIF_VALUE: 9
PIF_VALUE: 9
PIF_VALUE: 10
PIF_VALUE: 17
PIF_VALUE: 9
PIF_VALUE: 17
PIF_VALUE: 7
PIF_VALUE: 19
PIF_VALUE: 18
PIF_VALUE: 12
PIF_VALUE: 10
PIF_VALUE: 7
PIF_VALUE: 7
PIF_VALUE: 10
PIF_VALUE: 10
PIF_VALUE: 8
PIF_VALUE: 2
PIF_VALUE: 2
PIF_VALUE: 8
PIF_VALUE: 7
PIF_VALUE: 9
PIF_VALUE: 12
PIF_VALUE: 10
PIF_VALUE: 9
PIF_VALUE: 12
PIF_VALUE: 11
PIF_VALUE: 7
PIF_VALUE: 12
PIF_VALUE: 18
PIF_VALUE: 8
PIF_VALUE: 9
PIF_VALUE: 12
PIF_VALUE: 4
PIF_VALUE: 17
PIF_VALUE: 17
PIF_VALUE: 0
PIF_VALUE: 20
PIF_VALUE: 12
PIF_VALUE: 7
PIF_VALUE: 2
PIF_VALUE: 9
PIF_VALUE: 9
PIF_VALUE: 7
PIF_VALUE: 7
PIF_VALUE: 9
PIF_VALUE: 17
PIF_VALUE: 10
PIF_VALUE: 9
PIF_VALUE: 18
PIF_VALUE: 10
PIF_VALUE: 10
PIF_VALUE: 9
PIF_VALUE: 5
PIF_VALUE: 9
PIF_VALUE: 17
PIF_VALUE: 7

## 2021-09-17 ASSESSMENT — PAIN SCALES - GENERAL
PAINLEVEL_OUTOF10: 4
PAINLEVEL_OUTOF10: 2
PAINLEVEL_OUTOF10: 6
PAINLEVEL_OUTOF10: 1

## 2021-09-17 ASSESSMENT — PAIN DESCRIPTION - DESCRIPTORS
DESCRIPTORS: ACHING

## 2021-09-17 ASSESSMENT — PAIN DESCRIPTION - LOCATION
LOCATION: KNEE

## 2021-09-17 ASSESSMENT — PAIN DESCRIPTION - PAIN TYPE
TYPE: SURGICAL PAIN

## 2021-09-17 ASSESSMENT — PAIN DESCRIPTION - ORIENTATION
ORIENTATION: RIGHT

## 2021-09-17 ASSESSMENT — PAIN - FUNCTIONAL ASSESSMENT
PAIN_FUNCTIONAL_ASSESSMENT: PREVENTS OR INTERFERES SOME ACTIVE ACTIVITIES AND ADLS
PAIN_FUNCTIONAL_ASSESSMENT: 0-10

## 2021-09-17 NOTE — PROGRESS NOTES
Pt arrived from OR to PACU bay 8. Report received from OR staff. Pt arouses easily to voice. Surgical incisions viewed/dressing in place to right lower extremity. Knee immobilizer in locked position. Ice applied. 6L Simple mask, NSR, VSS. Will continue to monitor.

## 2021-09-17 NOTE — PROGRESS NOTES
Pt awake and alert. LUKE TREJO Pt  pain 3/10 no nausea, tolerating PO. Skin warm, palpable pulses and able to wiggle toes. Pt meets criteria to be discharged from phase 1.

## 2021-09-17 NOTE — ANESTHESIA POSTPROCEDURE EVALUATION
Department of Anesthesiology  Postprocedure Note    Patient: Omar Barker  MRN: [id number]  YOB: 1960  Date of evaluation: 9/17/2021  Time:  2:34 PM     Procedure Summary     Date: 09/17/21 Room / Location: [address]    Anesthesia Start: 1247 Anesthesia Stop: 3827    Procedure: RIGHT KNEE ARTHROSCOPY MEDIAL MENISCUS ROOT REPAIR (Right Knee) Diagnosis: (T64.852F  RIGHT KNEE MEDIAL MENISCUS ROOT TEAR)    Surgeons: Klarissa Ansari MD Responsible Provider: Kong Tran MD    Anesthesia Type: MAC, general ASA Status: 2          Anesthesia Type: MAC, general    Stefania Phase I: Stefania Score: 8    Stefania Phase II:      Last vitals: Reviewed and per EMR flowsheets.        Anesthesia Post Evaluation    Level of consciousness: awake  Complications: no

## 2021-09-17 NOTE — PROGRESS NOTES
CLINICAL PHARMACY NOTE: MEDS TO BEDS    Total # of Prescriptions Filled: 3   The following medications were delivered to the patient:  · Norco 5-325 mg  · Aspirin 325 mg  · Zofran 4 mg    Additional Documentation:    Delivered to Patient spouse-signed  Royce Clements CPhT

## 2021-09-17 NOTE — ANESTHESIA PRE PROCEDURE
Current medications:    Current Facility-Administered Medications   Medication Dose Route Frequency Provider Last Rate Last Admin    lactated ringers infusion   IntraVENous Continuous Gene Fnotaine MD        lidocaine PF 1 % injection 0.5 mL  0.5 mL IntraDERmal Once eGne Fontaine MD        ceFAZolin (ANCEF) 2000 mg in dextrose 5 % 50 mL IVPB  2,000 mg IntraVENous On Call to [address] MD        ortho mix injection   Injection On Call Gene Fontaine MD        tranexamic acid (CYKLOKAPRON) 1,000 mg in sodium chloride 0.9 % 50 mL IVPB  1,000 mg IntraVENous Once Gene Fontaine MD           Allergies: Allergies   Allergen Reactions    Molds & Smuts        Problem List:    Patient Active Problem List   Diagnosis Code    Medial epicondylitis M77.00    Right elbow pain M25.521       Past Medical History:        Diagnosis Date    Hypertension     Medial meniscus tear     right    Seasonal allergies     Thyroid disease     hypoactive       Past Surgical History:        Procedure Laterality Date    COLONOSCOPY      DENTAL SURGERY      implants       Social History:    Social History     Tobacco Use    Smoking status: Former Smoker    Smokeless tobacco: Never Used    Tobacco comment: quit 8-9 years ago/ smoked 5 cig daily   Substance Use Topics    Alcohol use:  Yes     Alcohol/week: 2.0 standard drinks     Types: 2 Cans of beer per week     Comment: weekly                                Counseling given: Not Answered  Comment: quit 8-9 years ago/ smoked 5 cig daily      Vital Signs (Current):   Vitals:    09/15/21 1545   Weight: 242 lb (109.8 kg)   Height: 5' 9\" (1.753 m)                                              BP Readings from Last 3 Encounters:   07/20/16 123/70   06/08/16 123/71   05/04/16 124/70       NPO Status:                                                                                 BMI:   Wt Readings from Last 3 Encounters:   09/15/21 242 lb (109.8 kg)   09/08/21 230 lb (104.3 kg)   09/07/21 230 lb (104.3 kg)     Body mass index is 35.74 kg/m². CBC: No results found for: WBC, RBC, HGB, HCT, MCV, RDW, PLT    CMP: No results found for: NA, K, CL, CO2, BUN, CREATININE, GFRAA, AGRATIO, LABGLOM, GLUCOSE, PROT, CALCIUM, BILITOT, ALKPHOS, AST, ALT    POC Tests: No results for input(s): POCGLU, POCNA, POCK, POCCL, POCBUN, POCHEMO, POCHCT in the last 72 hours. Coags: No results found for: PROTIME, INR, APTT    HCG (If Applicable): No results found for: PREGTESTUR, PREGSERUM, HCG, HCGQUANT     ABGs: No results found for: PHART, PO2ART, ORS2AQW, YDL3KZD, BEART, A4LANLGB     Type & Screen (If Applicable):  No results found for: LABABO, LABRH    Drug/Infectious Status (If Applicable):  No results found for: HIV, HEPCAB    COVID-19 Screening (If Applicable): No results found for: COVID19        Anesthesia Evaluation    Airway: Mallampati: II  TM distance: >3 FB   Neck ROM: full  Mouth opening: > = 3 FB Dental:          Pulmonary:                              Cardiovascular:    (+) hypertension:,         Rhythm: regular  Rate: normal                    Neuro/Psych:               GI/Hepatic/Renal:             Endo/Other:                     Abdominal:             Vascular: Other Findings:             Anesthesia Plan      MAC and general     ASA 2       Induction: intravenous. Anesthetic plan and risks discussed with patient. Plan discussed with CRNA.                   Cosmo Goodpasture, MD   9/17/2021

## 2021-09-17 NOTE — OP NOTE
Operative Note      Patient: Omar Barker  YOB: 1960  MRN: [id number]    Date of Procedure: 9/17/2021    Pre-Op Diagnosis: S80.80A  RIGHT KNEE MEDIAL MENISCUS ROOT TEAR    Post-Op Diagnosis: Same       Procedure(s):  RIGHT KNEE ARTHROSCOPY MEDIAL MENISCUS ROOT REPAIR    Surgeon(s):  Klarissa Ansari MD    Assistant:   * No surgical staff found *    Anesthesia: General    Estimated Blood Loss (mL): Minimal    Complications: None    Specimens:   * No specimens in log *    Implants:  Implant Name Type Inv. Item Serial No.  Lot No. LRB No. Used Action   BUTTON SUT [id number] TI FOR EULALIO BKUP FIBERWIRE FIX OF ACL PCL  BUTTON SUT [id number] TI FOR EULALIO BKUP FIBERWIRE FIX OF ACL PCL  [employer] [id number] Right 1 Implanted   CETERIX NOVOSTITCH PRO MENISCAL REPAIR SYSTEM, SIZE 2-0     [id number] Right 1 Implanted   CETERIX NOVOSTITCH MENISCAL REPAIR CARTRIDGE, SIZE 2-0     [id number] Right 1 Implanted   ARTHREX IMPLANT SYSTEM, MENISCAL ROOT REPAIR WITH BIOCOMPOSITE SWIVELOCK     [id number] Right 1 Implanted         Drains: * No LDAs found *    Findings: per dictation    Detailed Description of Procedure: The patient is a 63-year-old female who injured her right knee.  At the time she felt a pop within the back of the knee followed by significant swelling and pain.  She was unable to bear weight.  I saw and examined her in the office.  She was neurovascularly intact.  X-rays were normal with preserved joint spaces.  I recommend an MRI.  The MRI did reveal a posterior meniscus root tear medially.  She did appear to have overall intact chondral surfaces with suspected early chondral degeneration.   I discussed with her operative and nonoperative treatment options.  We elected proceed with right knee arthroscopy and posterior medial meniscus root repair.  The risks were defined as but not limited to infection, bleeding, damage to blood vessels or nerves, need for additional surgery.  Please refer to my office note for full details of the discussion. I thoroughly discussed the patient that to circumstances would preclude us from proceeding with the repair. 1 of which would be degenerative nonviable meniscus tissue that cannot accept sutures. The second would be advanced degeneration throughout the medial compartment. However if there is found to have be early chondral degeneration then I do think the patient would continue to benefit especially given the fact that she has preserved joint spaces.   She does understand this.     The patient was met in the preoperative holding area.  The surgical site was identified marked.  Informed consent was obtained. Tish Lentz was then taken back to the operating room and placed on the table in the supine position.  General anesthesia was performed.  Thigh tourniquet was placed.  The knee was examined under anesthesia.  There is no ligamentous instability with a full passive range of motion.  The lower extremity was prepped and draped using standard sterile technique.  Formal timeout was performed in which correct patient, surgery, and surgical site was reaffirmed.  Preoperative antibiotics were given within 30 minutes of skin incision.  At that point the tourniquet was insufflated to 250 mmHg.  Anterior inferior lateral incision was made.  Trocar was introduced into the notch and then into the patellofemoral joint.  Arthroscope was introduced.  Diagnostic arthroscopy was performed. 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  No loose bodies in the medial or lateral gutters.  Arthroscope was taken into the medial compartment.  Anterior inferior medial incision was made.  18-gauge spinal needle was used to trephinate the MCL to increase her working distance.  Posterior medial meniscus root was visualized and was a complete radial tear at the insertion.  Overall tissue quality is good.  There was grade 1 chondral changes of the weightbearing portion of the medial femoral condyle.  Chondral surface of the tibial plateau revealed isolated grade 1 changes of the lateral region.  At that point I elected to proceed with repair.  Novostitch devices were utilized in order to place locking luggage tag sutures within the posterior horn/root region of the medial meniscus.  Excellent purchase was achieved.  A separate medial portal was created for suture management.  We then elected to use our Arthrex meniscus root guide.  2 cm incision was made over the anterior lateral proximal tibia.  Fascial layer was incised and the musculature was gently elevated off of the proximal tibia.  The guide was set in the desired position based on anatomic landmarks.  Drilling was performed and we elected to ream a 6 mm wide by 5 mm deep tunnel with the Arthrex flip cutter.  Shuttling suture was placed in the suture limbs attached to the meniscus was successfully passed through the tibial tunnel.  The meniscus reduced anatomically.  The suture limbs were tied over a 4-hole metallic Arthrex button under direct visualization to arthroscope ensuring the appropriate tension.  Backup fixation was achieved with Arthrex swivel lock.  Fixation was achieved in 30 degrees of flexion.  The probe was introduced and demonstrated anatomic restoration of the posterior medial meniscus root with strong biomechanical stability.  The arthroscope was taken into the notch ACL and PCL were intact.  Lateral compartment was entered. Shad Arriola is no evidence of chondral or meniscus abnormality.  The knee was thoroughly irrigated.  Tourniquet was deflated.  Hemostasis was achieved.  2-0 Vicryl was placed within the fascial layer and a figure-of-eight fashion.  Subtenons layer was closed using 2-0 Vicryl in buried fashion.  Skin was closed using 3-0 nylon.  Sterile dressings were applied in form Xeroform, 4 x 4's, ABD, web roll, and Ace.  Was placed in a hinged knee brace.  Local anesthesia in the form of 30 mL of 0.5% Marcaine combined with epinephrine was injected prior to closure.  The patient was successfully extubated and taken recovery in excellent condition.  At the end of procedure all counts were correct. Of note, since this was a meniscus root repair requiring transtibial drilling it was much more technically demanding than a standard meniscus repair requiring additional 50 to percent of operative time.     Electronically signed by Simuel Gosselin, MD on 9/17/2021 at 2:33 PM

## 2021-09-17 NOTE — PROGRESS NOTES
Discharge instructions reviewed with patient/responsible adult. All home medications have been reviewed, questions answered and patient verbalized understanding. Discharge instructions signed and copies given. Patient discharged with belongings. Prescriptions delivered to spouse by pharmacy. Crutches given to patient along with use and directions for crutches. Patient verbalized understanding and demonstrated use of crutches.

## 2021-09-20 ENCOUNTER — HOSPITAL ENCOUNTER (OUTPATIENT)
Dept: PHYSICAL THERAPY | Age: 61
Setting detail: THERAPIES SERIES
Discharge: HOME | End: 2021-09-20
Payer: COMMERCIAL

## 2021-09-20 PROCEDURE — 97140 MANUAL THERAPY 1/> REGIONS: CPT

## 2021-09-20 PROCEDURE — 97164 PT RE-EVAL EST PLAN CARE: CPT

## 2021-09-20 PROCEDURE — 97110 THERAPEUTIC EXERCISES: CPT

## 2021-09-20 PROCEDURE — 97016 VASOPNEUMATIC DEVICE THERAPY: CPT

## 2021-09-20 NOTE — PLAN OF CARE
[facility],  [facility]      Physical Therapy Re-Certification Plan of Care/MD UPDATE      Dear Dr. Alexander Tucker,    We had the pleasure of treating the following patient for physical therapy services at [address] [place] [address]. A summary of our findings can be found in the updated assessment below. This includes our plan of care. If you have any questions or concerns regarding these findings, please do not hesitate to contact me at the office phone number checked above. Thank you for the referral.     Physician Signature:________________________________Date:__________________  By signing above (or electronic signature), therapists plan is approved by physician    Date Range Of Visits: 21 to 21  Total Visits to Date: 1  Overall Response to Treatment:   []Patient is responding well to treatment and improvement is noted with regards  to goals   []Patient should continue to improve in reasonable time if they continue HEP   []Patient has plateaued and is no longer responding to skilled PT intervention    []Patient is getting worse and would benefit from return to referring MD   []Patient unable to adhere to initial POC   [x]Other: Re-eval - patient had a R knee scope with meniscus repair on 21.     Physical Therapy Treatment Note/ Progress Report:     Date:  2021    Patient Name:  Berny Brunson    :  1960  MRN: [id number]  Medical/Treatment Diagnosis Information:  · Diagnosis: M23.306 Other meniscus derangements, unspecified meniscus, R knee; M25.561 R knee pain  · Treatment Diagnosis: M23.306 Other meniscus derangements, unspecified meniscus, R knee; M25.561 R knee pain  Insurance/Certification information:  PT Insurance Information: [facility]  Physician Information:  Referring Practitioner: Alexander Tucker  Plan of care signed (Y/N):     Date of Patient follow up with Physician:  for suture removal     Progress Report: [x]  Yes  []  No     Functional Scale: 75% disability - LEFS ()   Date: 21    Date Range for reporting period:  Beginnin21  Endin days or 10 visits    Progress report due (10 Rx/or 30 days whichever is less): 73/64/69     Recertification due (POC duration/ or 90 days whichever is less): 10/20/21     Visit # Insurance Allowable Auth Needed   [facility] []Yes    [x]No     Pain level:  3-4/10 at start of session. 1/10 at best. 3-4/10 at worst.      SUBJECTIVE:  Patient had a R knee scope and meniscus repair on 21. Patient is restricted to [facility] 82 on the R LE for 6 weeks. Patient arrived to PT in a wheelchair carrying her rolling walker. States that she was provided crutches immediately following surgery and, after trying to use them to get around, she felt a pop and a similar pain to that in her R knee prior to surgery. Patient has since been using the rolling walker and wheelchair because her L knee is hurting now when she puts all her weight through it. Patient to see Dr. Leopold Blamer for her L knee on . OBJECTIVE: 21   Observation:    Test measurements:    HIP Abd       HIP Ext       HIP IR       HIP ER       Knee ext -9 lacking 0   Knee Flex 90 120   Ankle PF       Ankle DF       Ankle In       Ankle Ev       Strength  LEFT RIGHT   HIP Flexors       HIP Abductors     HIP Ext       Hip ER       Knee EXT (quad)     Knee Flex (HS)     Ankle DF       Ankle PF       Ankle Inv       Ankle EV               Circumference  Mid apex  7 cm prox        44.0 cm  50.0 cm     45.5 cm  50.5 cm     Gait:  Patient came in in wheelchair carrying rolling walker because she attempted use of axillary crutches and felt a similar pop and pain on her uninvolved L LE.    RESTRICTIONS/PRECAUTIONS: High BP. Previous tobacco use (5 per day). R knee scope, meniscus repair on 21. NWBing on R LE for 6 weeks.     Exercises/Interventions:     Therapeutic Exercise Resistance Sets/sec Reps Notes   Gastroc stretch w/strap Long sitting 30s 5    Hamstring stretch Long sitting 30s 5    Ankle pumps Long sitting, supine, or at EOB 1 30    Heelslides w/strap Supine 10s 10    Quad sets  10s 10    SLR flexion Supine 1 10 W/PT assist; Cues to activate quad first, then lift          Patient education. 5 min Brace use and adjustment; how to appropriately wrap R LE using ACE bandage for edema control                                 Therapeutic Activities                                                               Manual Intervention       R patella mobs - inferior, medial/lateral   8 min Gr. I-II                                      NMR Re-education                                                                          Therapeutic Exercise and NMR EXR  [x] (15732) Provided verbal/tactile cueing for activities related to strengthening, flexibility, endurance, ROM for improvements in LE, proximal hip, and core control with self care, mobility, lifting, ambulation. [x] (39456) Provided verbal/tactile cueing for activities related to improving balance, coordination, kinesthetic sense, posture, motor skill, proprioception  to assist with LE, proximal hip, and core control in self care, mobility, lifting, ambulation and eccentric single leg control.      NMR and Therapeutic Activities:    [x] (20083 or 70939) Provided verbal/tactile cueing for activities related to improving balance, coordination, kinesthetic sense, posture, motor skill, proprioception and motor activation to allow for proper function of core, proximal hip and LE with self care and ADLs  [x] (08597) Gait Re-education- Provided training and instruction to the patient for proper LE, core and proximal hip recruitment and positioning and eccentric body weight control with ambulation re-education including up and down stairs     Home Exercise Program:    [x] (97332) Reviewed/Progressed HEP activities related to strengthening, flexibility, endurance, ROM of core, proximal hip and LE for functional self-care, mobility, lifting and ambulation/stair navigation   [x] (45676)Reviewed/Progressed HEP activities related to improving balance, coordination, kinesthetic sense, posture, motor skill, proprioception of core, proximal hip and LE for self care, mobility, lifting, and ambulation/stair navigation      Manual Treatments:  PROM / STM / Oscillations-Mobs:  G-I, II, III, IV (PA's, Inf., Post.)  [x] (67271) Provided manual therapy to mobilize LE, proximal hip and/or LS spine soft tissue/joints for the purpose of modulating pain, promoting relaxation,  increasing ROM, reducing/eliminating soft tissue swelling/inflammation/restriction, improving soft tissue extensibility and allowing for proper ROM for normal function with self care, mobility, lifting and ambulation. Modalities:  Game Ready to R knee for pain/inflammation/edema - 15 minutes. 9/20    Charges:  Timed Code Treatment Minutes: 30   Total Treatment Minutes: 60       [] EVAL (LOW) 23129 (typically 20 minutes face-to-face)  [] EVAL (MOD) 03750 (typically 30 minutes face-to-face)  [] EVAL (HIGH) 80329 (typically 45 minutes face-to-face)  [x] RE-EVAL     [x] HQ(90101) x 1    [] IONTO (59207)  [] NMR (99062) x     [x] VASO (39307)  [x] Manual (72139) x 1    [] Other:  [] TA (55314)x    [] Mech Traction (72156)  [] ES(attended) (41616)     [] ES (un) (70512):     GOALS:  Patient stated goal: Be able to do normal activities. [x] Progressing: [] Met: [] Not Met: [] Adjusted    Therapist goals for Patient:   Short Term Goals: To be achieved in: 2 weeks  1. Independent in HEP and progression per patient tolerance, in order to prevent re-injury. []? Progressing: []? Met: []? Not Met: []? Adjusted  2. Patient will have a decrease in pain to facilitate improvement in movement, function, and ADLs as indicated by Functional Deficits. []? Progressing: []? Met: []? Not Met: []? Adjusted     Long Term Goals:  To be achieved in: 8 weeks  1. Disability index score of 20% or less for the LEFS to assist with reaching prior level of function. []? Progressing: []? Met: []? Not Met: []? Adjusted  2. Patient will demonstrate increased AROM to 0-120 to allow for proper joint functioning as indicated by patients Functional Deficits. []? Progressing: []? Met: []? Not Met: []? Adjusted  3. Patient will demonstrate an increase in strength to good proximal hip strength and control, within 5lb HHD in LE to allow for proper functional mobility as indicated by patients Functional Deficits. []? Progressing: []? Met: []? Not Met: []? Adjusted  4. Patient will demonstrate normal gait mechanics without increased symptoms or restriction to allow him to walk and perform all daily mobility. []? Progressing: []? Met: []? Not Met: []? Adjusted  5. Patient will be able to navigate stairs up/down with reciprocal gait mechanics without increased symptoms or restriction to allow him normal household mobility without restricted access to certain parts of his home. []? Progressing: []? Met: []? Not Met: []? Adjusted           Progression Towards Functional goals:  [] Patient is progressing as expected towards functional goals listed. [] Progression is slowed due to complexities listed. [] Progression has been slowed due to co-morbidities.   [x] Plan just implemented, too soon to assess goals progression  [] Other:     ASSESSMENT:  See eval    Return to Play: (if applicable)   []  Stage 1: Intro to Strength   []  Stage 2: Return to Run and Strength   []  Stage 3: Return to Jump and Strength   []  Stage 4: Dynamic Strength and Agility   []  Stage 5: Sport Specific Training     []  Ready to Return to Play, Meets All Above Stages   []  Not Ready for Return to Sports   Comments:            Treatment/Activity Tolerance:  [x] Patient tolerated treatment well [] Patient limited by fatique  [] Patient limited by pain  [] Patient limited by other medical complications  [] Other:     Overall Progression Towards Functional goals/ Treatment Progress Update:  [] Patient is progressing as expected towards functional goals listed. [] Progression is slowed due to complexities/Impairments listed. [] Progression has been slowed due to co-morbidities. [x] Plan just implemented, too soon to assess goals progression <30days   [] Goals require adjustment due to lack of progress  [] Patient is not progressing as expected and requires additional follow up with physician  [] Other    Prognosis for POC: [x] Good [] Fair  [] Poor    Patient requires continued skilled intervention: [x] Yes  [] No        PLAN: See eval  [] Continue per plan of care [] Alter current plan (see comments)  [x] Plan of care initiated [] Hold pending MD visit [] Discharge    Electronically signed by: Dilma Nuñez, PT, DPT, MS, SCS    Note: If patient does not return for scheduled/recommended follow up visits, this note will serve as a discharge from care along with the most recent update on progress.

## 2021-09-22 ENCOUNTER — HOSPITAL ENCOUNTER (OUTPATIENT)
Dept: PHYSICAL THERAPY | Age: 61
Setting detail: THERAPIES SERIES
Discharge: HOME | End: 2021-09-22
Payer: COMMERCIAL

## 2021-09-22 ENCOUNTER — OFFICE VISIT (OUTPATIENT)
Dept: ORTHOPEDIC SURGERY | Age: 61
End: 2021-09-22
Payer: COMMERCIAL

## 2021-09-22 VITALS — WEIGHT: 245 LBS | HEIGHT: 69 IN | BODY MASS INDEX: 36.29 KG/M2

## 2021-09-22 DIAGNOSIS — M25.562 LEFT KNEE PAIN, UNSPECIFIED CHRONICITY: Primary | ICD-10-CM

## 2021-09-22 PROCEDURE — G8417 CALC BMI ABV UP PARAM F/U: HCPCS | Performed by: ORTHOPAEDIC SURGERY

## 2021-09-22 PROCEDURE — 97112 NEUROMUSCULAR REEDUCATION: CPT

## 2021-09-22 PROCEDURE — 97140 MANUAL THERAPY 1/> REGIONS: CPT

## 2021-09-22 PROCEDURE — G8427 DOCREV CUR MEDS BY ELIG CLIN: HCPCS | Performed by: ORTHOPAEDIC SURGERY

## 2021-09-22 PROCEDURE — 1036F TOBACCO NON-USER: CPT | Performed by: ORTHOPAEDIC SURGERY

## 2021-09-22 PROCEDURE — 97110 THERAPEUTIC EXERCISES: CPT

## 2021-09-22 PROCEDURE — 3017F COLORECTAL CA SCREEN DOC REV: CPT | Performed by: ORTHOPAEDIC SURGERY

## 2021-09-22 PROCEDURE — 97016 VASOPNEUMATIC DEVICE THERAPY: CPT

## 2021-09-22 PROCEDURE — 99214 OFFICE O/P EST MOD 30 MIN: CPT | Performed by: ORTHOPAEDIC SURGERY

## 2021-09-22 NOTE — PROGRESS NOTES
2021     Interval History:  Left knee injury sustained on 2021    The patient recently underwent a right knee posterior medial meniscus root repair with me on 2021. Since then she has been nonweightbearing with a brace. However she does report that on the night of the surgery on 2021 she injured her left knee. She was applying weight when she felt a popping sensation followed by significant pain. Since then she has had persistent pain within the left knee with difficulty bearing weight. She also reports catching locking along with swelling. Medical History:  Patient's medications, allergies, past medical, surgical, social, and family histories were reviewed and updated as appropriate. Objective:  Ht 5' 9\" (1.753 m)   Wt 245 lb (111.1 kg)   BMI 36.18 kg/m²      Physical Exam:  Examination of the left knee   There is a + effusion, no gross deformity or skin changes  Range of motion reveals 0 to 120  neg lachman, negative posterior drawer, no pain or laxity with varus or valgus stress at 0 degrees and 30 degrees of flexion  + medial joint line tenderness  5 out of 5 strength throughout distal muscle groups  Sensation is intact to light touch throughout all distributions  There is no calf swelling or tenderness  Palpable DP pulse, brisk cap refill, 2+ symmetric reflexes    Imagin view x-rays of the left knee were obtained in the office today on 2021. There is no fracture or dislocation. No other abnormality. Preserved joint spaces. Assessment:  Left knee injury sustained on 2021    Plan:  I discussed the patient the differential diagnosis. This point I would recommend further evaluation with an MRI. She is in agreement. Following study she will return to review the results and discuss treatment options. Greater than 30 minutes were spent with this encounter.   Time spent included evaluating the patient's chart prior to arrival.  Evaluating the patient in the office including history, physical examination, imaging reviewing, and counseling on next steps. Lastly, time was spent discussing orders with my staff as well as providing documentation in the chart. Electronically signed by Alexander Tucker MD on 9/22/21 at 10:28 AM EDT     Disclaimer: This note was dictated with voice recognition software. Though review and correction are routine, we apologize for any errors.

## 2021-09-22 NOTE — FLOWSHEET NOTE
[facility]    Physical Therapy Treatment Note/ Progress Report:     Date:  2021    Patient Name:  Lencho Self    :  1960  MRN: [id number]  Medical/Treatment Diagnosis Information:  · Diagnosis: M23.306 Other meniscus derangements, unspecified meniscus, R knee; M25.561 R knee pain  · Treatment Diagnosis: M23.306 Other meniscus derangements, unspecified meniscus, R knee; M25.561 R knee pain  Insurance/Certification information:  PT Insurance Information: [facility]  Physician Information:  Referring Practitioner: Bradley Mcconnell  Plan of care signed (Y/N):     Date of Patient follow up with Physician:  for suture removal     Progress Report: []  Yes  [x]  No     Functional Scale: 75% disability - LEFS ()   Date: 21    Date Range for reporting period:  Beginnin21  Endin days or 10 visits    Progress report due (10 Rx/or 30 days whichever is less): /     Recertification due (POC duration/ or 90 days whichever is less): 10/20/21     Visit # Insurance Allowable Auth Needed   3 [facility] []Yes    [x]No     Pain level:  3/10 at start of session; 3-4/10 at worst.      SUBJECTIVE:   Patient had an appointment with Dr. Warden Montiel prior to today's session to discuss recent pop and pain in L knee with full WBing through L knee on crutches immediately following surgery. Patient has not done much with the rolling walker other than transferring from one seat to another. Primarily mobile with the wheelchair due to L knee pain. Dr. Warden Montiel ordered an MRI for the L knee. Patient to have the L knee scheduled for an MRI some time this week. Will follow back up for suture removal and hopefully, MRI review next Wednesday.      OBJECTIVE: 21   Observation:    Test measurements:    HIP Abd       HIP Ext       HIP IR       HIP ER       Knee ext -9 lacking 0   Knee Flex 90 120   Ankle PF       Ankle DF       Ankle In       Ankle Ev     Strength  LEFT RIGHT   HIP Flexors       HIP Abductors     HIP Ext       Hip ER       Knee EXT (quad)     Knee Flex (HS)     Ankle DF       Ankle PF       Ankle Inv       Ankle EV               Circumference  Mid apex  7 cm prox        44.0 cm  50.0 cm     45.5 cm  50.5 cm     Gait:  Patient came in in wheelchair carrying rolling walker because she attempted use of axillary crutches and felt a similar pop and pain on her uninvolved L LE.    RESTRICTIONS/PRECAUTIONS: High BP. Previous tobacco use (5 per day). R knee scope, meniscus repair on 9/17/21. NWBing on R LE for 6 weeks. Exercises/Interventions:     Therapeutic Exercise Resistance Sets/sec Reps Notes   Gastroc stretch w/strap Long sitting 30s 5    Hamstring stretch Long sitting 30s 5    Ankle pumps Long sitting, supine, or at EOB 1 30 HEP   Heelslides w/strap Supine 10s 10    Quad sets  10s 10 Done with NMES. See below. SLR flexion Supine 2 10 W/PT assist; Cues to activate quad first, then lift   SLR abduction   nv    SLR adduction   nv                                  Therapeutic Activities                                                               Manual Intervention       R patella mobs - inferior, medial/lateral   6 min Gr. I-II   R knee PROM   6 min                                NMR Re-education       NMES with QS 50 mA  8 min 10 sec on/20 sec off                                                               Therapeutic Exercise and NMR EXR  [x] (41090) Provided verbal/tactile cueing for activities related to strengthening, flexibility, endurance, ROM for improvements in LE, proximal hip, and core control with self care, mobility, lifting, ambulation. [x] (86756) Provided verbal/tactile cueing for activities related to improving balance, coordination, kinesthetic sense, posture, motor skill, proprioception  to assist with LE, proximal hip, and core control in self care, mobility, lifting, ambulation and eccentric single leg control.      NMR [] Other:  [] TA (61123)x    [] Mech Traction (47344)  [] ES(attended) (50713)     [] ES (un) (81039):     GOALS:  Patient stated goal: Be able to do normal activities. [x] Progressing: [] Met: [] Not Met: [] Adjusted    Therapist goals for Patient:   Short Term Goals: To be achieved in: 2 weeks  1. Independent in HEP and progression per patient tolerance, in order to prevent re-injury. []? Progressing: []? Met: []? Not Met: []? Adjusted  2. Patient will have a decrease in pain to facilitate improvement in movement, function, and ADLs as indicated by Functional Deficits. []? Progressing: []? Met: []? Not Met: []? Adjusted     Long Term Goals: To be achieved in: 8 weeks  1. Disability index score of 20% or less for the LEFS to assist with reaching prior level of function. []? Progressing: []? Met: []? Not Met: []? Adjusted  2. Patient will demonstrate increased AROM to 0-120 to allow for proper joint functioning as indicated by patients Functional Deficits. []? Progressing: []? Met: []? Not Met: []? Adjusted  3. Patient will demonstrate an increase in strength to good proximal hip strength and control, within 5lb HHD in LE to allow for proper functional mobility as indicated by patients Functional Deficits. []? Progressing: []? Met: []? Not Met: []? Adjusted  4. Patient will demonstrate normal gait mechanics without increased symptoms or restriction to allow him to walk and perform all daily mobility. []? Progressing: []? Met: []? Not Met: []? Adjusted  5. Patient will be able to navigate stairs up/down with reciprocal gait mechanics without increased symptoms or restriction to allow him normal household mobility without restricted access to certain parts of his home. []? Progressing: []? Met: []? Not Met: []? Adjusted           Progression Towards Functional goals:  [] Patient is progressing as expected towards functional goals listed. [] Progression is slowed due to complexities listed.   [] Progression has been slowed due to co-morbidities. [x] Plan just implemented, too soon to assess goals progression  [] Other:     ASSESSMENT:  Patient to have MRI for L knee some time this week due to recent pop and increased L knee pain with weight bearing after attempting to use axillary crutches immediately following R knee surgery. Most of patient's mobility has been performed with wheelchair because of L knee pain. Only using rolling walker for transfers at this time. Initiated NMES with quad sets today to improve volitional quad activation and muscle firing. Patient demonstrated significant improvement in quad activation with SLR following NMES. Return to Play: (if applicable)   []  Stage 1: Intro to Strength   []  Stage 2: Return to Run and Strength   []  Stage 3: Return to Jump and Strength   []  Stage 4: Dynamic Strength and Agility   []  Stage 5: Sport Specific Training     []  Ready to Return to Play, Meets All Above Stages   []  Not Ready for Return to Sports   Comments:            Treatment/Activity Tolerance:  [x] Patient tolerated treatment well [] Patient limited by fatique  [] Patient limited by pain  [] Patient limited by other medical complications  [] Other:     Overall Progression Towards Functional goals/ Treatment Progress Update:  [] Patient is progressing as expected towards functional goals listed. [] Progression is slowed due to complexities/Impairments listed. [] Progression has been slowed due to co-morbidities.   [x] Plan just implemented, too soon to assess goals progression <30days   [] Goals require adjustment due to lack of progress  [] Patient is not progressing as expected and requires additional follow up with physician  [] Other    Prognosis for POC: [x] Good [] Fair  [] Poor    Patient requires continued skilled intervention: [x] Yes  [] No        PLAN: See eval  [] Continue per plan of care [] Alter current plan (see comments)  [x] Plan of care initiated [] Hold pending MD visit [] Discharge    Electronically signed by: Michelle Caceres, PT, DPT, MS, SCS    Note: If patient does not return for scheduled/recommended follow up visits, this note will serve as a discharge from care along with the most recent update on progress.

## 2021-09-27 ENCOUNTER — HOSPITAL ENCOUNTER (OUTPATIENT)
Dept: PHYSICAL THERAPY | Age: 61
Setting detail: THERAPIES SERIES
Discharge: HOME | End: 2021-09-27
Payer: COMMERCIAL

## 2021-09-27 PROCEDURE — 97016 VASOPNEUMATIC DEVICE THERAPY: CPT

## 2021-09-27 PROCEDURE — 97140 MANUAL THERAPY 1/> REGIONS: CPT

## 2021-09-27 PROCEDURE — 97110 THERAPEUTIC EXERCISES: CPT

## 2021-09-27 PROCEDURE — 97112 NEUROMUSCULAR REEDUCATION: CPT

## 2021-09-27 NOTE — FLOWSHEET NOTE
[facility]    Physical Therapy Treatment Note/ Progress Report:     Date:  2021    Patient Name:  Brandon Montague    :  1960  MRN: [id number]  Medical/Treatment Diagnosis Information:  · Diagnosis: M23.306 Other meniscus derangements, unspecified meniscus, R knee; M25.561 R knee pain  · Treatment Diagnosis: M23.306 Other meniscus derangements, unspecified meniscus, R knee; M25.561 R knee pain  Insurance/Certification information:  PT Insurance Information: [facility]  Physician Information:  Referring Practitioner: Teresa Lux  Plan of care signed (Y/N):     Date of Patient follow up with Physician:  for suture removal     Progress Report: []  Yes  [x]  No     Functional Scale: 75% disability - LEFS ()   Date: 21    Date Range for reporting period:  Beginnin21  Endin days or 10 visits    Progress report due (10 Rx/or 30 days whichever is less):      Recertification due (POC duration/ or 90 days whichever is less): 10/20/21     Visit # Insurance Allowable Auth Needed   4 [facility] []Yes    [x]No     Pain level:  2/10 at start of session; 3-4/10 at worst.      SUBJECTIVE:   Patient had an MRI on her L knee on Friday of last week.  Patient to follow back up with Dr. Marcos Joshi for suture removal on the R knee and hopefully, MRI review for the L knee this Wednesday prior to PT.     OBJECTIVE: 21   Observation:    Test measurements:    HIP Abd       HIP Ext       HIP IR       HIP ER       Knee ext -9 lacking 0   Knee Flex 90 120   Ankle PF       Ankle DF       Ankle In       Ankle Ev       Strength  LEFT RIGHT   HIP Flexors       HIP Abductors     HIP Ext       Hip ER       Knee EXT (quad)     Knee Flex (HS)     Ankle DF       Ankle PF       Ankle Inv       Ankle EV               Circumference  Mid apex  7 cm prox        44.0 cm  50.0 cm     45.5 cm  50.5 cm     Gait:  Patient came in in wheelchair carrying rolling training and instruction to the patient for proper LE, core and proximal hip recruitment and positioning and eccentric body weight control with ambulation re-education including up and down stairs     Home Exercise Program:    [x] (79145) Reviewed/Progressed HEP activities related to strengthening, flexibility, endurance, ROM of core, proximal hip and LE for functional self-care, mobility, lifting and ambulation/stair navigation   [x] (68139)Reviewed/Progressed HEP activities related to improving balance, coordination, kinesthetic sense, posture, motor skill, proprioception of core, proximal hip and LE for self care, mobility, lifting, and ambulation/stair navigation      Manual Treatments:  PROM / STM / Oscillations-Mobs:  G-I, II, III, IV (PA's, Inf., Post.)  [x] (88044) Provided manual therapy to mobilize LE, proximal hip and/or LS spine soft tissue/joints for the purpose of modulating pain, promoting relaxation,  increasing ROM, reducing/eliminating soft tissue swelling/inflammation/restriction, improving soft tissue extensibility and allowing for proper ROM for normal function with self care, mobility, lifting and ambulation. Modalities:  Game Ready to R knee for pain/inflammation/edema - 10 minutes. 9/27    Charges:  Timed Code Treatment Minutes: 45   Total Treatment Minutes: 55       [] EVAL (LOW) 06582 (typically 20 minutes face-to-face)  [] EVAL (MOD) 09669 (typically 30 minutes face-to-face)  [] EVAL (HIGH) 11187 (typically 45 minutes face-to-face)  [] RE-EVAL     [x] ZW(41460) x 1    [] IONTO (57828)  [x] NMR (11454) x 1    [x] VASO (31711)  [x] Manual (95375) x 1    [] Other:  [] TA (41600)x    [] Mech Traction (29910)  [] ES(attended) (36895)     [] ES (un) (15920):     GOALS:  Patient stated goal: Be able to do normal activities. [x] Progressing: [] Met: [] Not Met: [] Adjusted    Therapist goals for Patient:   Short Term Goals: To be achieved in: 2 weeks  1.  Independent in HEP and progression per patient tolerance, in order to prevent re-injury. []? Progressing: []? Met: []? Not Met: []? Adjusted  2. Patient will have a decrease in pain to facilitate improvement in movement, function, and ADLs as indicated by Functional Deficits. []? Progressing: []? Met: []? Not Met: []? Adjusted     Long Term Goals: To be achieved in: 8 weeks  1. Disability index score of 20% or less for the LEFS to assist with reaching prior level of function. []? Progressing: []? Met: []? Not Met: []? Adjusted  2. Patient will demonstrate increased AROM to 0-120 to allow for proper joint functioning as indicated by patients Functional Deficits. []? Progressing: []? Met: []? Not Met: []? Adjusted  3. Patient will demonstrate an increase in strength to good proximal hip strength and control, within 5lb HHD in LE to allow for proper functional mobility as indicated by patients Functional Deficits. []? Progressing: []? Met: []? Not Met: []? Adjusted  4. Patient will demonstrate normal gait mechanics without increased symptoms or restriction to allow him to walk and perform all daily mobility. []? Progressing: []? Met: []? Not Met: []? Adjusted  5. Patient will be able to navigate stairs up/down with reciprocal gait mechanics without increased symptoms or restriction to allow him normal household mobility without restricted access to certain parts of his home. []? Progressing: []? Met: []? Not Met: []? Adjusted           Progression Towards Functional goals:  [] Patient is progressing as expected towards functional goals listed. [] Progression is slowed due to complexities listed. [] Progression has been slowed due to co-morbidities. [x] Plan just implemented, too soon to assess goals progression  [] Other:     ASSESSMENT:  Patient had an MRI on her L knee on Friday of last week.  Patient to follow back up with Dr. Michelle Swanson for suture removal on the R knee and hopefully, MRI review for the L knee this Wednesday prior to PT. Patient demonstrating significant improvement in quad activation on the R compared to last week. Continued NMES with quad sets to continue improving volitional quad activation and muscle firing. Able to actively perform SLR flexion today without PT assist still cueing to initiate each repetition with a quad set to maximize volitional quad activation. Return to Play: (if applicable)   []  Stage 1: Intro to Strength   []  Stage 2: Return to Run and Strength   []  Stage 3: Return to Jump and Strength   []  Stage 4: Dynamic Strength and Agility   []  Stage 5: Sport Specific Training     []  Ready to Return to Play, Meets All Above Stages   []  Not Ready for Return to Sports   Comments:            Treatment/Activity Tolerance:  [x] Patient tolerated treatment well [] Patient limited by fatique  [] Patient limited by pain  [] Patient limited by other medical complications  [] Other:     Overall Progression Towards Functional goals/ Treatment Progress Update:  [] Patient is progressing as expected towards functional goals listed. [] Progression is slowed due to complexities/Impairments listed. [] Progression has been slowed due to co-morbidities. [x] Plan just implemented, too soon to assess goals progression <30days   [] Goals require adjustment due to lack of progress  [] Patient is not progressing as expected and requires additional follow up with physician  [] Other    Prognosis for POC: [x] Good [] Fair  [] Poor    Patient requires continued skilled intervention: [x] Yes  [] No        PLAN: See eval  [] Continue per plan of care [] Alter current plan (see comments)  [x] Plan of care initiated [] Hold pending MD visit [] Discharge    Electronically signed by: Saadia Layton PT, KAVEHT, MS, SCS    Note: If patient does not return for scheduled/recommended follow up visits, this note will serve as a discharge from care along with the most recent update on progress.

## 2021-09-29 ENCOUNTER — HOSPITAL ENCOUNTER (OUTPATIENT)
Dept: PHYSICAL THERAPY | Age: 61
Setting detail: THERAPIES SERIES
Discharge: HOME | End: 2021-09-29
Payer: COMMERCIAL

## 2021-09-29 ENCOUNTER — OFFICE VISIT (OUTPATIENT)
Dept: ORTHOPEDIC SURGERY | Age: 61
End: 2021-09-29
Payer: COMMERCIAL

## 2021-09-29 VITALS — WEIGHT: 245 LBS | HEIGHT: 69 IN | BODY MASS INDEX: 36.29 KG/M2

## 2021-09-29 DIAGNOSIS — Z98.890 HISTORY OF MEDIAL MENISCUS REPAIR OF RIGHT KNEE: Primary | ICD-10-CM

## 2021-09-29 DIAGNOSIS — M25.562 LEFT KNEE PAIN, UNSPECIFIED CHRONICITY: ICD-10-CM

## 2021-09-29 DIAGNOSIS — G89.18 POST-OP PAIN: ICD-10-CM

## 2021-09-29 PROCEDURE — L1812 KO ELASTIC W/JOINTS PRE OTS: HCPCS | Performed by: ORTHOPAEDIC SURGERY

## 2021-09-29 PROCEDURE — 1036F TOBACCO NON-USER: CPT | Performed by: ORTHOPAEDIC SURGERY

## 2021-09-29 PROCEDURE — 97140 MANUAL THERAPY 1/> REGIONS: CPT

## 2021-09-29 PROCEDURE — 99214 OFFICE O/P EST MOD 30 MIN: CPT | Performed by: ORTHOPAEDIC SURGERY

## 2021-09-29 PROCEDURE — 97016 VASOPNEUMATIC DEVICE THERAPY: CPT

## 2021-09-29 PROCEDURE — 97112 NEUROMUSCULAR REEDUCATION: CPT

## 2021-09-29 PROCEDURE — 97110 THERAPEUTIC EXERCISES: CPT

## 2021-09-29 PROCEDURE — G8427 DOCREV CUR MEDS BY ELIG CLIN: HCPCS | Performed by: ORTHOPAEDIC SURGERY

## 2021-09-29 PROCEDURE — G8417 CALC BMI ABV UP PARAM F/U: HCPCS | Performed by: ORTHOPAEDIC SURGERY

## 2021-09-29 PROCEDURE — 3017F COLORECTAL CA SCREEN DOC REV: CPT | Performed by: ORTHOPAEDIC SURGERY

## 2021-09-29 RX ORDER — RIVAROXABAN 10 MG/1
10 TABLET, FILM COATED ORAL
Qty: 30 TABLET | Refills: 0 | Status: SHIPPED | OUTPATIENT
Start: 2021-09-29 | End: 2021-12-14

## 2021-09-29 NOTE — FLOWSHEET NOTE
[facility]    Physical Therapy Treatment Note/ Progress Report:     Date:  2021    Patient Name:  Arnulfo Garza    :  1960  MRN: [id number]  Medical/Treatment Diagnosis Information:  · Diagnosis: M23.306 Other meniscus derangements, unspecified meniscus, R knee; M25.561 R knee pain  · Treatment Diagnosis: M23.306 Other meniscus derangements, unspecified meniscus, R knee; M25.561 R knee pain  Insurance/Certification information:  PT Insurance Information: [facility]  Physician Information:  Referring Practitioner: Salima Alford  Plan of care signed (Y/N):     Date of Patient follow up with Physician: 10/27     Progress Report: []  Yes  [x]  No     Functional Scale: 75% disability - LEFS ()   Date: 21    Date Range for reporting period:  Beginnin21  Endin days or 10 visits    Progress report due (10 Rx/or 30 days whichever is less):      Recertification due (POC duration/ or 90 days whichever is less): 10/20/21     Visit # Insurance Allowable Auth Needed   5 [facility] []Yes    [x]No     Pain level:  2/10 at start of session; 3-4/10 at worst.      SUBJECTIVE:   Patient saw Dr. Remy Sousa for a follow up immediately prior to today's session for suture removal on the R knee and to review L knee MRI. L knee MRI indicates the exact same injury as the R knee. Patient will likely need the same surgery on the L knee after she has recovered further from the recent R knee surgery. Patient was provided with a L knee brace to help with support and stability in the meantime. Encouraged to weight bear on the L maintaining NWBing restrictions on the R as much as she can tolerate without increasing discomfort to the L knee.      OBJECTIVE: 21   Observation:    Test measurements:    HIP Abd       HIP Ext       HIP IR       HIP ER       Knee ext -9 lacking 0   Knee Flex 90 120   Ankle PF       Ankle DF       Ankle In       Ankle Ev     Strength  LEFT RIGHT   HIP Flexors       HIP Abductors     HIP Ext       Hip ER       Knee EXT (quad)     Knee Flex (HS)     Ankle DF       Ankle PF       Ankle Inv       Ankle EV               Circumference  Mid apex  7 cm prox        44.0 cm  50.0 cm     45.5 cm  50.5 cm     Gait:  Patient came in in wheelchair carrying rolling walker because she attempted use of axillary crutches and felt a similar pop and pain on her uninvolved L LE.    RESTRICTIONS/PRECAUTIONS: High BP. Previous tobacco use (5 per day). R knee scope, meniscus repair on 9/17/21. NWBing on R LE for 6 weeks. Exercises/Interventions:     Therapeutic Exercise Resistance Sets/sec Reps Notes   Gastroc stretch w/strap Long sitting 30s 5    Hamstring stretch Long sitting 30s 5    Ankle pumps Long sitting, supine, or at EOB 1 30 HEP   Heelslides w/strap Supine 10s 10    Quad sets  10s 10    SLR flexion Supine 2 10 Done with NMES. See below. SLR abduction  2 10 PT assist for correct positioning, muscle act   SLR adduction  2 10                                  Therapeutic Activities                                                               Manual Intervention       R patella mobs - inferior, medial/lateral   6 min Gr. I-II   R knee PROM   6 min                                NMR Re-education       NMES with SLR flexion 50 mA  8 min 10 sec on/20 sec off                                                               Therapeutic Exercise and NMR EXR  [x] (50638) Provided verbal/tactile cueing for activities related to strengthening, flexibility, endurance, ROM for improvements in LE, proximal hip, and core control with self care, mobility, lifting, ambulation. [x] (37611) Provided verbal/tactile cueing for activities related to improving balance, coordination, kinesthetic sense, posture, motor skill, proprioception  to assist with LE, proximal hip, and core control in self care, mobility, lifting, ambulation and eccentric single leg control. NMR and Therapeutic Activities:    [x] (72306 or 44867) Provided verbal/tactile cueing for activities related to improving balance, coordination, kinesthetic sense, posture, motor skill, proprioception and motor activation to allow for proper function of core, proximal hip and LE with self care and ADLs  [x] (44573) Gait Re-education- Provided training and instruction to the patient for proper LE, core and proximal hip recruitment and positioning and eccentric body weight control with ambulation re-education including up and down stairs     Home Exercise Program:    [x] (55045) Reviewed/Progressed HEP activities related to strengthening, flexibility, endurance, ROM of core, proximal hip and LE for functional self-care, mobility, lifting and ambulation/stair navigation   [x] (63901)Reviewed/Progressed HEP activities related to improving balance, coordination, kinesthetic sense, posture, motor skill, proprioception of core, proximal hip and LE for self care, mobility, lifting, and ambulation/stair navigation      Manual Treatments:  PROM / STM / Oscillations-Mobs:  G-I, II, III, IV (PA's, Inf., Post.)  [x] (79624) Provided manual therapy to mobilize LE, proximal hip and/or LS spine soft tissue/joints for the purpose of modulating pain, promoting relaxation,  increasing ROM, reducing/eliminating soft tissue swelling/inflammation/restriction, improving soft tissue extensibility and allowing for proper ROM for normal function with self care, mobility, lifting and ambulation. Modalities:  Game Ready to R knee for pain/inflammation/edema - 10 minutes.  9/29    Charges:  Timed Code Treatment Minutes: 45   Total Treatment Minutes: 55       [] EVAL (LOW) 64022 (typically 20 minutes face-to-face)  [] EVAL (MOD) 46298 (typically 30 minutes face-to-face)  [] EVAL (HIGH) 67623 (typically 45 minutes face-to-face)  [] RE-EVAL     [x] IO(28245) x 1    [] IONTO (89410)  [x] NMR (04220) x 1    [x] VASO (29520)  [x] Manual ([id number]) x 1    [] Other:  [] TA (06145)x    [] Mech Traction (28606)  [] ES(attended) (55747)     [] ES (un) (73205):     GOALS:  Patient stated goal: Be able to do normal activities. [x] Progressing: [] Met: [] Not Met: [] Adjusted    Therapist goals for Patient:   Short Term Goals: To be achieved in: 2 weeks  1. Independent in HEP and progression per patient tolerance, in order to prevent re-injury. []? Progressing: []? Met: []? Not Met: []? Adjusted  2. Patient will have a decrease in pain to facilitate improvement in movement, function, and ADLs as indicated by Functional Deficits. []? Progressing: []? Met: []? Not Met: []? Adjusted     Long Term Goals: To be achieved in: 8 weeks  1. Disability index score of 20% or less for the LEFS to assist with reaching prior level of function. []? Progressing: []? Met: []? Not Met: []? Adjusted  2. Patient will demonstrate increased AROM to 0-120 to allow for proper joint functioning as indicated by patients Functional Deficits. []? Progressing: []? Met: []? Not Met: []? Adjusted  3. Patient will demonstrate an increase in strength to good proximal hip strength and control, within 5lb HHD in LE to allow for proper functional mobility as indicated by patients Functional Deficits. []? Progressing: []? Met: []? Not Met: []? Adjusted  4. Patient will demonstrate normal gait mechanics without increased symptoms or restriction to allow him to walk and perform all daily mobility. []? Progressing: []? Met: []? Not Met: []? Adjusted  5. Patient will be able to navigate stairs up/down with reciprocal gait mechanics without increased symptoms or restriction to allow him normal household mobility without restricted access to certain parts of his home. []? Progressing: []? Met: []? Not Met: []? Adjusted           Progression Towards Functional goals:  [x] Patient is progressing as expected towards functional goals listed.     [] Progression is slowed due to complexities listed. [] Progression has been slowed due to co-morbidities. [] Plan just implemented, too soon to assess goals progression  [] Other:     ASSESSMENT:  Patient saw Dr. Rikki Montano for a follow up immediately prior to today's session for suture removal on the R knee and to review L knee MRI. L knee MRI indicates the exact same injury as the R knee. Patient will likely need the same surgery on the L knee after she has recovered further from the recent R knee surgery. Patient was provided with a L knee brace to help with support and stability in the meantime. Encouraged to weight bear on the L maintaining NWBing restrictions on the R as much as she can tolerate without increasing discomfort to the L knee. Able to progress NMES to performance with flexion SLR to continue progression of volitional quad activation and muscle firing. Initiated sidelying hip abduction and adduction for hip strengthening to address proximal deficits likely contributing to difficulty with R knee stability and function. Return to Play: (if applicable)   []  Stage 1: Intro to Strength   []  Stage 2: Return to Run and Strength   []  Stage 3: Return to Jump and Strength   []  Stage 4: Dynamic Strength and Agility   []  Stage 5: Sport Specific Training     []  Ready to Return to Play, Meets All Above Stages   []  Not Ready for Return to Sports   Comments:            Treatment/Activity Tolerance:  [x] Patient tolerated treatment well [] Patient limited by fatique  [] Patient limited by pain  [] Patient limited by other medical complications  [] Other:     Overall Progression Towards Functional goals/ Treatment Progress Update:  [x] Patient is progressing as expected towards functional goals listed. [] Progression is slowed due to complexities/Impairments listed. [] Progression has been slowed due to co-morbidities.   [] Plan just implemented, too soon to assess goals progression <30days   [] Goals require adjustment due to lack of progress  [] Patient is not progressing as expected and requires additional follow up with physician  [] Other    Prognosis for POC: [x] Good [] Fair  [] Poor    Patient requires continued skilled intervention: [x] Yes  [] No        PLAN: See eval  [x] Continue per plan of care [] Alter current plan (see comments)  [] Plan of care initiated [] Hold pending MD visit [] Discharge    Electronically signed by: Michelle Caceres, PT, DPT, MS, SCS    Note: If patient does not return for scheduled/recommended follow up visits, this note will serve as a discharge from care along with the most recent update on progress.

## 2021-09-30 NOTE — PROGRESS NOTES
2021     Interval History:  Left knee injury sustained on 2021    The patient recently underwent a right knee posterior medial meniscus root repair with me on 2021. Since then she has been nonweightbearing with a brace. However she does report that on the night of the surgery on 2021 she injured her left knee. She was applying weight when she felt a popping sensation followed by significant pain. Since then she has had persistent pain within the left knee with difficulty bearing weight. She also reports catching locking along with swelling. At the last visit we elected proceed with an MRI of her left knee. She is here to review the results. She does report that her right side is doing quite well. Medical History:  Patient's medications, allergies, past medical, surgical, social, and family histories were reviewed and updated as appropriate. Objective:  Ht 5' 9\" (1.753 m)   Wt 245 lb (111.1 kg)   BMI 36.18 kg/m²      Physical Exam:  Examination of the left knee   There is a + effusion, no gross deformity or skin changes  Range of motion reveals 0 to 120  neg lachman, negative posterior drawer, no pain or laxity with varus or valgus stress at 0 degrees and 30 degrees of flexion  + medial joint line tenderness  5 out of 5 strength throughout distal muscle groups  Sensation is intact to light touch throughout all distributions  There is no calf swelling or tenderness  Palpable DP pulse, brisk cap refill, 2+ symmetric reflexes    Imagin view x-rays of the left knee were obtained in the office today on 2021. There is no fracture or dislocation. No other abnormality. Preserved joint spaces. Part of the left knee was reviewed. There is a radial tear of the root attachment of the posterior medial meniscus. Extrusion of the body. 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   2 view x-rays in the form of AP and lateral x-ray of the right knee was obtained in the office today on 9/29/2021. There is no fracture or dislocation. No other abnormality    Assessment:  Left knee injury sustained on 9/17/2021    Plan:  I had a very long discussion with the patient. We spent time reviewing the imaging findings of the left knee. Unfortunately she has sustained a meniscus root tear. Similar to the other side we discussed the biomechanical consequences of this type of tear. It does put her at increased risk for accelerated degeneration of the knee. However she is currently recovering from a right knee meniscus root repair. Therefore would recommend waiting to consider surgical intervention on the left side until she is weightbearing as tolerated which will be another 4 weeks. Even then we would have to wait a period time until she has enough strength and endurance to be able to undergo a surgery on her left side if she chooses. Currently we will increase her DVT prophylaxis to Eliquis. Continue physical therapy and the right side. Return to see me in 4 weeks. Greater than 30 minutes were spent with this encounter. Time spent included evaluating the patient's chart prior to arrival.  Evaluating the patient in the office including history, physical examination, imaging reviewing, and counseling on next steps. Lastly, time was spent discussing orders with my staff as well as providing documentation in the chart. Electronically signed by Carmita Tai MD on 9/22/21 at 10:28 AM EDT     Disclaimer: This note was dictated with voice recognition software. Though review and correction are routine, we apologize for any errors.

## 2021-10-04 ENCOUNTER — HOSPITAL ENCOUNTER (OUTPATIENT)
Dept: PHYSICAL THERAPY | Age: 61
Setting detail: THERAPIES SERIES
Discharge: HOME | End: 2021-10-04
Payer: COMMERCIAL

## 2021-10-04 ENCOUNTER — TELEPHONE (OUTPATIENT)
Dept: ORTHOPEDIC SURGERY | Age: 61
End: 2021-10-04

## 2021-10-04 DIAGNOSIS — Z98.890 HISTORY OF MEDIAL MENISCUS REPAIR OF RIGHT KNEE: ICD-10-CM

## 2021-10-04 DIAGNOSIS — G89.18 POST-OP PAIN: Primary | ICD-10-CM

## 2021-10-04 PROCEDURE — 97112 NEUROMUSCULAR REEDUCATION: CPT

## 2021-10-04 PROCEDURE — 97110 THERAPEUTIC EXERCISES: CPT

## 2021-10-04 PROCEDURE — 97140 MANUAL THERAPY 1/> REGIONS: CPT

## 2021-10-04 NOTE — FLOWSHEET NOTE
[facility]    Physical Therapy Treatment Note/ Progress Report:     Date:  10/4/2021    Patient Name:  Vee Bearden    :  1960  MRN: [id number]  Medical/Treatment Diagnosis Information:  · Diagnosis: M23.306 Other meniscus derangements, unspecified meniscus, R knee; M25.561 R knee pain  · Treatment Diagnosis: M23.306 Other meniscus derangements, unspecified meniscus, R knee; M25.561 R knee pain  Insurance/Certification information:  PT Insurance Information: [facility]  Physician Information:  Referring Practitioner: Jamin Earl  Plan of care signed (Y/N):     Date of Patient follow up with Physician: 10/27     Progress Report: []  Yes  [x]  No     Functional Scale: 75% disability - LEFS ()   Date: 21    Date Range for reporting period:  Beginnin21  Endin days or 10 visits    Progress report due (10 Rx/or 30 days whichever is less):      Recertification due (POC duration/ or 90 days whichever is less): 10/20/21     Visit # Insurance Allowable Auth Needed   6 [facility] []Yes    [x]No     Pain level:  2/10 at start of session; 3-4/10 at worst.      SUBJECTIVE:   Patient performing full HEP on the L knee in addition to the R knee to try to maintain motion and strength on the L knee as best she can prior to L knee surgery in a few weeks.      OBJECTIVE: 21   Observation:    Test measurements:    HIP Abd       HIP Ext       HIP IR       HIP ER       Knee ext -9 lacking 0   Knee Flex 90 120   Ankle PF       Ankle DF       Ankle In       Ankle Ev       Strength  LEFT RIGHT   HIP Flexors       HIP Abductors     HIP Ext       Hip ER       Knee EXT (quad)     Knee Flex (HS)     Ankle DF       Ankle PF       Ankle Inv       Ankle EV               Circumference  Mid apex  7 cm prox        44.0 cm  50.0 cm     45.5 cm  50.5 cm     Gait:  Patient came in in wheelchair carrying rolling walker because she attempted use of axillary crutches and felt a similar pop and pain on her uninvolved L LE.    RESTRICTIONS/PRECAUTIONS: High BP. Previous tobacco use (5 per day). R knee scope, meniscus repair on 9/17/21. NWBing on R LE for 6 weeks. Exercises/Interventions:     Therapeutic Exercise Resistance Sets/sec Reps Notes   Gastroc stretch w/strap Long sitting 30s 5    Hamstring stretch Long sitting 30s 5    Ankle pumps Long sitting, supine, or at EOB 1 30 HEP   Heelslides w/strap Supine 10s 10    Quad sets  10s 10    SLR flexion Supine 2 10 Done with NMES. See below. SAQ over bolster  5s 2x10    SLR abduction  2 10    SLR adduction  2 10                                  Therapeutic Activities                                                               Manual Intervention       R patella mobs - inferior, medial/lateral   6 min Gr. I-II   R knee PROM - EOB, supine   6 min                                NMR Re-education       NMES with SLR flexion 50 mA  8 min 10 sec on/20 sec off                                                               Therapeutic Exercise and NMR EXR  [x] (68121) Provided verbal/tactile cueing for activities related to strengthening, flexibility, endurance, ROM for improvements in LE, proximal hip, and core control with self care, mobility, lifting, ambulation. [x] (67923) Provided verbal/tactile cueing for activities related to improving balance, coordination, kinesthetic sense, posture, motor skill, proprioception  to assist with LE, proximal hip, and core control in self care, mobility, lifting, ambulation and eccentric single leg control.      NMR and Therapeutic Activities:    [x] (70206 or 32172) Provided verbal/tactile cueing for activities related to improving balance, coordination, kinesthetic sense, posture, motor skill, proprioception and motor activation to allow for proper function of core, proximal hip and LE with self care and ADLs  [x] (61426) Gait Re-education- Provided training and instruction to the patient for proper LE, core and proximal hip recruitment and positioning and eccentric body weight control with ambulation re-education including up and down stairs     Home Exercise Program:    [x] (25768) Reviewed/Progressed HEP activities related to strengthening, flexibility, endurance, ROM of core, proximal hip and LE for functional self-care, mobility, lifting and ambulation/stair navigation   [x] (01196)Reviewed/Progressed HEP activities related to improving balance, coordination, kinesthetic sense, posture, motor skill, proprioception of core, proximal hip and LE for self care, mobility, lifting, and ambulation/stair navigation      Manual Treatments:  PROM / STM / Oscillations-Mobs:  G-I, II, III, IV (PA's, Inf., Post.)  [x] (41459) Provided manual therapy to mobilize LE, proximal hip and/or LS spine soft tissue/joints for the purpose of modulating pain, promoting relaxation,  increasing ROM, reducing/eliminating soft tissue swelling/inflammation/restriction, improving soft tissue extensibility and allowing for proper ROM for normal function with self care, mobility, lifting and ambulation. Modalities:  Game Ready to R knee for pain/inflammation/edema - 15 minutes. 10/4    Charges:  Timed Code Treatment Minutes: 45   Total Treatment Minutes: 60       [] EVAL (LOW) 89704 (typically 20 minutes face-to-face)  [] EVAL (MOD) 18010 (typically 30 minutes face-to-face)  [] EVAL (HIGH) 35441 (typically 45 minutes face-to-face)  [] RE-EVAL     [x] SN(41228) x 1    [] IONTO (08429)  [x] NMR (42207) x 1    [] VASO (42780)  [x] Manual (85336) x 1    [] Other:  [] TA (79148)x    [] Mech Traction (77716)  [] ES(attended) (63938)     [] ES (un) (35153):     GOALS:  Patient stated goal: Be able to do normal activities. [x] Progressing: [] Met: [] Not Met: [] Adjusted    Therapist goals for Patient:   Short Term Goals: To be achieved in: 2 weeks  1.  Independent in HEP and progression per patient tolerance, in order to prevent re-injury. []? Progressing: []? Met: []? Not Met: []? Adjusted  2. Patient will have a decrease in pain to facilitate improvement in movement, function, and ADLs as indicated by Functional Deficits. []? Progressing: []? Met: []? Not Met: []? Adjusted     Long Term Goals: To be achieved in: 8 weeks  1. Disability index score of 20% or less for the LEFS to assist with reaching prior level of function. []? Progressing: []? Met: []? Not Met: []? Adjusted  2. Patient will demonstrate increased AROM to 0-120 to allow for proper joint functioning as indicated by patients Functional Deficits. []? Progressing: []? Met: []? Not Met: []? Adjusted  3. Patient will demonstrate an increase in strength to good proximal hip strength and control, within 5lb HHD in LE to allow for proper functional mobility as indicated by patients Functional Deficits. []? Progressing: []? Met: []? Not Met: []? Adjusted  4. Patient will demonstrate normal gait mechanics without increased symptoms or restriction to allow him to walk and perform all daily mobility. []? Progressing: []? Met: []? Not Met: []? Adjusted  5. Patient will be able to navigate stairs up/down with reciprocal gait mechanics without increased symptoms or restriction to allow him normal household mobility without restricted access to certain parts of his home. []? Progressing: []? Met: []? Not Met: []? Adjusted           Progression Towards Functional goals:  [x] Patient is progressing as expected towards functional goals listed. [] Progression is slowed due to complexities listed. [] Progression has been slowed due to co-morbidities. [] Plan just implemented, too soon to assess goals progression  [] Other:     ASSESSMENT:    Patient WBAT on L LE with L knee brace for increased support and stability until she can have a L knee scope and subsequent meniscal repair on the L.  Continued NMES with flexion SLR with focus on volitional quad activation and muscle firing. SLR is much improved noting minimal to no quad lag during eccentric phase. Continued [place] NWBing hip strengthening to address proximal deficits likely contributing to difficulty with R knee stability and function. Return to Play: (if applicable)   []  Stage 1: Intro to Strength   []  Stage 2: Return to Run and Strength   []  Stage 3: Return to Jump and Strength   []  Stage 4: Dynamic Strength and Agility   []  Stage 5: Sport Specific Training     []  Ready to Return to Play, Meets All Above Stages   []  Not Ready for Return to Sports   Comments:            Treatment/Activity Tolerance:  [x] Patient tolerated treatment well [] Patient limited by fatique  [] Patient limited by pain  [] Patient limited by other medical complications  [] Other:     Overall Progression Towards Functional goals/ Treatment Progress Update:  [x] Patient is progressing as expected towards functional goals listed. [] Progression is slowed due to complexities/Impairments listed. [] Progression has been slowed due to co-morbidities. [] Plan just implemented, too soon to assess goals progression <30days   [] Goals require adjustment due to lack of progress  [] Patient is not progressing as expected and requires additional follow up with physician  [] Other    Prognosis for POC: [x] Good [] Fair  [] Poor    Patient requires continued skilled intervention: [x] Yes  [] No        PLAN: See eval  [x] Continue per plan of care [] Alter current plan (see comments)  [] Plan of care initiated [] Hold pending MD visit [] Discharge    Electronically signed by: Masood Clemons, PT, DPT, MS, SCS    Note: If patient does not return for scheduled/recommended follow up visits, this note will serve as a discharge from care along with the most recent update on progress.

## 2021-10-06 ENCOUNTER — HOSPITAL ENCOUNTER (OUTPATIENT)
Dept: PHYSICAL THERAPY | Age: 61
Setting detail: THERAPIES SERIES
Discharge: HOME | End: 2021-10-06
Payer: COMMERCIAL

## 2021-10-06 PROCEDURE — 97110 THERAPEUTIC EXERCISES: CPT

## 2021-10-06 PROCEDURE — 97112 NEUROMUSCULAR REEDUCATION: CPT

## 2021-10-06 PROCEDURE — 97140 MANUAL THERAPY 1/> REGIONS: CPT

## 2021-10-06 NOTE — FLOWSHEET NOTE
[address] [facility]    Physical Therapy Treatment Note/ Progress Report:     Date:  10/6/2021    Patient Name:  Karen Childs    :  1960  MRN: [id number]  Medical/Treatment Diagnosis Information:  · Diagnosis: M23.306 Other meniscus derangements, unspecified meniscus, R knee; M25.561 R knee pain  · Treatment Diagnosis: M23.306 Other meniscus derangements, unspecified meniscus, R knee; M25.561 R knee pain  Insurance/Certification information:  PT Insurance Information: [facility]  Physician Information:  Referring Practitioner: Randee Mott  Plan of care signed (Y/N):     Date of Patient follow up with Physician: 10/27     Progress Report: []  Yes  [x]  No     Functional Scale: 75% disability - LEFS ()   Date: 21    Date Range for reporting period:  Beginnin21  Endin days or 10 visits    Progress report due (10 Rx/or 30 days whichever is less):      Recertification due (POC duration/ or 90 days whichever is less): 10/20/21     Visit # Insurance Allowable Auth Needed   7 [facility] []Yes    [x]No     Pain level:  2/10 at start of session; 3-4/10 at worst.      SUBJECTIVE:   Patient performing full HEP on the L knee in addition to the R knee to try to maintain motion and strength on the L knee as best she can prior to L knee surgery in a few weeks.      OBJECTIVE: 21   Observation:    Test measurements:    HIP Abd       HIP Ext       HIP IR       HIP ER       Knee ext -9 lacking 0   Knee Flex 90 120   Ankle PF       Ankle DF       Ankle In       Ankle Ev       Strength  LEFT RIGHT   HIP Flexors       HIP Abductors     HIP Ext       Hip ER       Knee EXT (quad)     Knee Flex (HS)     Ankle DF       Ankle PF       Ankle Inv       Ankle EV               Circumference  Mid apex  7 cm prox        44.0 cm  50.0 cm     45.5 cm  50.5 cm     Gait:  Patient came in in wheelchair carrying rolling walker because she attempted use of axillary crutches and felt a similar pop and pain on her uninvolved L LE.    RESTRICTIONS/PRECAUTIONS: High BP. Previous tobacco use (5 per day). R knee scope, meniscus repair on 9/17/21. NWBing on R LE for 6 weeks. Exercises/Interventions:     Therapeutic Exercise Resistance Sets/sec Reps Notes   Gastroc stretch w/strap Long sitting 30s 5    Hamstring stretch Long sitting 30s 5    Ankle pumps Long sitting, supine, or at EOB 1 30 HEP   Heelslides w/strap Supine 10s 10    Quad sets  10s 10    SLR flexion Supine 2 10 Done with NMES. See below. SAQ over bolster  5s 2x10    LAQ @EOB 0-60 2 10 No resistance. Focus on quad act, control   SLR abduction  2 10    SLR adduction  2 10                                  Therapeutic Activities                                                               Manual Intervention       R patella mobs - inferior, medial/lateral   5 min Gr. I-II   R knee PROM - EOB, supine   5 min                                NMR Re-education       NMES with SLR flexion 50 mA  8 min 10 sec on/20 sec off                                                             Access Code: [email or web address]  URL: [email or web address]  Date: 10/06/2021  Prepared by: Judith Rosas    Exercises  Sidelying Hip Abduction - 1-2 x daily - 7 x weekly - 2-3 sets - 10 reps  Sidelying Hip Adduction - 1-2 x daily - 7 x weekly - 2-3 sets - 10 reps      Therapeutic Exercise and NMR EXR  [x] (09308) Provided verbal/tactile cueing for activities related to strengthening, flexibility, endurance, ROM for improvements in LE, proximal hip, and core control with self care, mobility, lifting, ambulation. [x] (44475) Provided verbal/tactile cueing for activities related to improving balance, coordination, kinesthetic sense, posture, motor skill, proprioception  to assist with LE, proximal hip, and core control in self care, mobility, lifting, ambulation and eccentric single leg control.      NMR and Therapeutic Activities:    [x] (27045 or 42681) Provided verbal/tactile cueing for activities related to improving balance, coordination, kinesthetic sense, posture, motor skill, proprioception and motor activation to allow for proper function of core, proximal hip and LE with self care and ADLs  [x] (96212) Gait Re-education- Provided training and instruction to the patient for proper LE, core and proximal hip recruitment and positioning and eccentric body weight control with ambulation re-education including up and down stairs     Home Exercise Program:    [x] (96043) Reviewed/Progressed HEP activities related to strengthening, flexibility, endurance, ROM of core, proximal hip and LE for functional self-care, mobility, lifting and ambulation/stair navigation   [x] (02752)Reviewed/Progressed HEP activities related to improving balance, coordination, kinesthetic sense, posture, motor skill, proprioception of core, proximal hip and LE for self care, mobility, lifting, and ambulation/stair navigation      Manual Treatments:  PROM / STM / Oscillations-Mobs:  G-I, II, III, IV (PA's, Inf., Post.)  [x] (00199) Provided manual therapy to mobilize LE, proximal hip and/or LS spine soft tissue/joints for the purpose of modulating pain, promoting relaxation,  increasing ROM, reducing/eliminating soft tissue swelling/inflammation/restriction, improving soft tissue extensibility and allowing for proper ROM for normal function with self care, mobility, lifting and ambulation. Modalities:  Game Ready to R knee for pain/inflammation/edema - 15 minutes.  10/6    Charges:  Timed Code Treatment Minutes: 45   Total Treatment Minutes: 60       [] EVAL (LOW) 43466 (typically 20 minutes face-to-face)  [] EVAL (MOD) 08403 (typically 30 minutes face-to-face)  [] EVAL (HIGH) 71498 (typically 45 minutes face-to-face)  [] RE-EVAL     [x] JN(67511) x 1    [] IONTO (21729)  [x] NMR (12644) x 1    [] VASO (18926)  [x] Manual (38751) x 1    [] Other:  [] TA (82505)x    [] [facility] co-morbidities. [] Plan just implemented, too soon to assess goals progression  [] Other:     ASSESSMENT:    Initiated LAQ at EOB today with restricted range focusing on quad activation and control throughout. Updated HEP with [facility] NWBing hip strengthening tasks for continued carryover in proximal hip strength between sessions. Return to Play: (if applicable)   []  Stage 1: Intro to Strength   []  Stage 2: Return to Run and Strength   []  Stage 3: Return to Jump and Strength   []  Stage 4: Dynamic Strength and Agility   []  Stage 5: Sport Specific Training     []  Ready to Return to Play, Meets All Above Stages   []  Not Ready for Return to Sports   Comments:            Treatment/Activity Tolerance:  [x] Patient tolerated treatment well [] Patient limited by fatique  [] Patient limited by pain  [] Patient limited by other medical complications  [] Other:     Overall Progression Towards Functional goals/ Treatment Progress Update:  [x] Patient is progressing as expected towards functional goals listed. [] Progression is slowed due to complexities/Impairments listed. [] Progression has been slowed due to co-morbidities. [] Plan just implemented, too soon to assess goals progression <30days   [] Goals require adjustment due to lack of progress  [] Patient is not progressing as expected and requires additional follow up with physician  [] Other    Prognosis for POC: [x] Good [] Fair  [] Poor    Patient requires continued skilled intervention: [x] Yes  [] No        PLAN: See eval  [x] Continue per plan of care [] Alter current plan (see comments)  [] Plan of care initiated [] Hold pending MD visit [] Discharge    Electronically signed by: Keturah Brown, PT, DPT, MS, SCS    Note: If patient does not return for scheduled/recommended follow up visits, this note will serve as a discharge from care along with the most recent update on progress.

## 2021-10-11 ENCOUNTER — HOSPITAL ENCOUNTER (OUTPATIENT)
Dept: PHYSICAL THERAPY | Age: 61
Setting detail: THERAPIES SERIES
Discharge: HOME | End: 2021-10-11
Payer: COMMERCIAL

## 2021-10-11 PROCEDURE — 97140 MANUAL THERAPY 1/> REGIONS: CPT

## 2021-10-11 PROCEDURE — 97110 THERAPEUTIC EXERCISES: CPT

## 2021-10-11 PROCEDURE — 97112 NEUROMUSCULAR REEDUCATION: CPT

## 2021-10-11 NOTE — FLOWSHEET NOTE
[facility]    Physical Therapy Treatment Note/ Progress Report:     Date:  10/11/2021    Patient Name:  Conor Cm    :  1960  MRN: [id number]  Medical/Treatment Diagnosis Information:  · Diagnosis: M23.306 Other meniscus derangements, unspecified meniscus, R knee; M25.561 R knee pain  · Treatment Diagnosis: M23.306 Other meniscus derangements, unspecified meniscus, R knee; M25.561 R knee pain  Insurance/Certification information:  PT Insurance Information: [facility]  Physician Information:  Referring Practitioner: hCuy Camarena  Plan of care signed (Y/N):     Date of Patient follow up with Physician: 10/27     Progress Report: []  Yes  [x]  No     Functional Scale: 75% disability - LEFS ()   Date: 21    Date Range for reporting period:  Beginnin21  Endin days or 10 visits    Progress report due (10 Rx/or 30 days whichever is less): 10/74/65     Recertification due (POC duration/ or 90 days whichever is less): 10/20/21     Visit # Insurance Allowable Auth Needed   8 [facility] []Yes    [x]No     Pain level:  1-2/10 at start of session; 3-4/10 at worst.      SUBJECTIVE:   Patient has been attending several [employer] courses at [place] with her  daily. Some of these courses are in person and some of them are performed over a Zoom call, but they all require a lot of sitting in her wheelchair. Reports that she had minimal time to perform her exercises on Friday, so her R knee was very stiff on Saturday when she finally had the chance to perform exercises.      OBJECTIVE: 21   Observation:    Test measurements:    HIP Abd       HIP Ext       HIP IR       HIP ER       Knee ext -9 lacking 0   Knee Flex 90 120   Ankle PF       Ankle DF       Ankle In       Ankle Ev       Strength  LEFT RIGHT   HIP Flexors       HIP Abductors     HIP Ext       Hip ER       Knee EXT (quad)     Knee Flex (HS)     Ankle DF       Ankle PF       Ankle Inv       Ankle EV               Circumference  Mid apex  7 cm prox        44.0 cm  50.0 cm     45.5 cm  50.5 cm     Gait:  Patient came in in wheelchair carrying rolling walker because she attempted use of axillary crutches and felt a similar pop and pain on her uninvolved L LE.    RESTRICTIONS/PRECAUTIONS: High BP. Previous tobacco use (5 per day). R knee scope, meniscus repair on 9/17/21. NWBing on R LE for 6 weeks. Exercises/Interventions:     Therapeutic Exercise Resistance Sets/sec Reps Notes   Gastroc stretch w/strap Long sitting 30s 5    Hamstring stretch Long sitting 30s 5    Ankle pumps Long sitting, supine, or at EOB 1 30 HEP   Heelslides w/strap Supine 10s 10    Quad sets  10s 10    SLR flexion Supine 2 10 Done with NMES. See below. SAQ over bolster  5s 2x10    LAQ @EOB 0-60 2 10 No resistance. Focus on quad act, control   SLR abduction  2 10    SLR adduction  2 10                                  Therapeutic Activities                                                               Manual Intervention       R patella mobs - inferior, medial/lateral   5 min Gr. I-II   R knee PROM - EOB, supine   5 min                                NMR Re-education       NMES with SLR flexion 60 mA  8 min 10 sec on/20 sec off                                                             Access Code: [email or web address]  URL: [email or web address]  Date: 10/06/2021  Prepared by: Tessie Archer    Exercises  Sidelying Hip Abduction - 1-2 x daily - 7 x weekly - 2-3 sets - 10 reps  Sidelying Hip Adduction - 1-2 x daily - 7 x weekly - 2-3 sets - 10 reps      Therapeutic Exercise and NMR EXR  [x] (46034) Provided verbal/tactile cueing for activities related to strengthening, flexibility, endurance, ROM for improvements in LE, proximal hip, and core control with self care, mobility, lifting, ambulation.   [x] (92342) Provided verbal/tactile cueing for activities related to improving balance, coordination, kinesthetic sense, posture, motor skill, proprioception  to assist with LE, proximal hip, and core control in self care, mobility, lifting, ambulation and eccentric single leg control. NMR and Therapeutic Activities:    [x] (24405 or 06141) Provided verbal/tactile cueing for activities related to improving balance, coordination, kinesthetic sense, posture, motor skill, proprioception and motor activation to allow for proper function of core, proximal hip and LE with self care and ADLs  [x] (46253) Gait Re-education- Provided training and instruction to the patient for proper LE, core and proximal hip recruitment and positioning and eccentric body weight control with ambulation re-education including up and down stairs     Home Exercise Program:    [x] (54108) Reviewed/Progressed HEP activities related to strengthening, flexibility, endurance, ROM of core, proximal hip and LE for functional self-care, mobility, lifting and ambulation/stair navigation   [x] (20543)Reviewed/Progressed HEP activities related to improving balance, coordination, kinesthetic sense, posture, motor skill, proprioception of core, proximal hip and LE for self care, mobility, lifting, and ambulation/stair navigation      Manual Treatments:  PROM / STM / Oscillations-Mobs:  G-I, II, III, IV (PA's, Inf., Post.)  [x] (14847) Provided manual therapy to mobilize LE, proximal hip and/or LS spine soft tissue/joints for the purpose of modulating pain, promoting relaxation,  increasing ROM, reducing/eliminating soft tissue swelling/inflammation/restriction, improving soft tissue extensibility and allowing for proper ROM for normal function with self care, mobility, lifting and ambulation. Modalities:  Game Ready to R knee for pain/inflammation/edema - 10 minutes.  10/11    Charges:  Timed Code Treatment Minutes: 45   Total Treatment Minutes: 55       [] EVAL (LOW) 20501 (typically 20 minutes face-to-face)  [] EVAL (MOD) 92277 (typically 30 minutes face-to-face)  [] EVAL (HIGH) 28446 (typically 45 minutes face-to-face)  [] RE-EVAL     [x] TD(09851) x 1    [] IONTO (34234)  [x] NMR (32942) x 1    [] VASO (45686)  [x] Manual (03403) x 1    [] Other:  [] TA (37003)x    [] Mech Traction (50131)  [] ES(attended) (20326)     [] ES (un) (19425):     GOALS:  Patient stated goal: Be able to do normal activities. [x] Progressing: [] Met: [] Not Met: [] Adjusted    Therapist goals for Patient:   Short Term Goals: To be achieved in: 2 weeks  1. Independent in HEP and progression per patient tolerance, in order to prevent re-injury. []? Progressing: []? Met: []? Not Met: []? Adjusted  2. Patient will have a decrease in pain to facilitate improvement in movement, function, and ADLs as indicated by Functional Deficits. []? Progressing: []? Met: []? Not Met: []? Adjusted     Long Term Goals: To be achieved in: 8 weeks  1. Disability index score of 20% or less for the LEFS to assist with reaching prior level of function. []? Progressing: []? Met: []? Not Met: []? Adjusted  2. Patient will demonstrate increased AROM to 0-120 to allow for proper joint functioning as indicated by patients Functional Deficits. []? Progressing: []? Met: []? Not Met: []? Adjusted  3. Patient will demonstrate an increase in strength to good proximal hip strength and control, within 5lb HHD in LE to allow for proper functional mobility as indicated by patients Functional Deficits. []? Progressing: []? Met: []? Not Met: []? Adjusted  4. Patient will demonstrate normal gait mechanics without increased symptoms or restriction to allow him to walk and perform all daily mobility. []? Progressing: []? Met: []? Not Met: []? Adjusted  5. Patient will be able to navigate stairs up/down with reciprocal gait mechanics without increased symptoms or restriction to allow him normal household mobility without restricted access to certain parts of his home. []? Progressing: []? Met: []? Not Met: []?  Adjusted Progression Towards Functional goals:  [x] Patient is progressing as expected towards functional goals listed. [] Progression is slowed due to complexities listed. [] Progression has been slowed due to co-morbidities. [] Plan just implemented, too soon to assess goals progression  [] Other:     ASSESSMENT:    3 weeks post-op. Patient's POC is pretty restricted at this time due to [address] status on post-op R knee and due to same injury and pain recently occurring on the uninvolved L knee requiring the same surgery on the L knee in the near future. POC focused on R knee ROM and [place] quad activation and hip strengthening tasks in accordance with surgical restrictions and patient tolerance as weight bearing is limited on L LE at this time due to pain. Return to Play: (if applicable)   []  Stage 1: Intro to Strength   []  Stage 2: Return to Run and Strength   []  Stage 3: Return to Jump and Strength   []  Stage 4: Dynamic Strength and Agility   []  Stage 5: Sport Specific Training     []  Ready to Return to Play, Meets All Above Stages   []  Not Ready for Return to Sports   Comments:            Treatment/Activity Tolerance:  [x] Patient tolerated treatment well [] Patient limited by fatique  [] Patient limited by pain  [] Patient limited by other medical complications  [] Other:     Overall Progression Towards Functional goals/ Treatment Progress Update:  [x] Patient is progressing as expected towards functional goals listed. [] Progression is slowed due to complexities/Impairments listed. [] Progression has been slowed due to co-morbidities.   [] Plan just implemented, too soon to assess goals progression <30days   [] Goals require adjustment due to lack of progress  [] Patient is not progressing as expected and requires additional follow up with physician  [] Other    Prognosis for POC: [x] Good [] Fair  [] Poor    Patient requires continued skilled intervention: [x] Yes  [] No        PLAN: See

## 2021-10-13 ENCOUNTER — HOSPITAL ENCOUNTER (OUTPATIENT)
Dept: PHYSICAL THERAPY | Age: 61
Setting detail: THERAPIES SERIES
Discharge: HOME | End: 2021-10-13
Payer: COMMERCIAL

## 2021-10-13 PROCEDURE — 97110 THERAPEUTIC EXERCISES: CPT

## 2021-10-13 PROCEDURE — 97112 NEUROMUSCULAR REEDUCATION: CPT

## 2021-10-13 PROCEDURE — 97140 MANUAL THERAPY 1/> REGIONS: CPT

## 2021-10-13 NOTE — FLOWSHEET NOTE
Inv       Ankle EV               Circumference  Mid apex  7 cm prox        44.0 cm  50.0 cm     45.5 cm  50.5 cm     Gait:  Patient came in in wheelchair carrying rolling walker because she attempted use of axillary crutches and felt a similar pop and pain on her uninvolved L LE.    RESTRICTIONS/PRECAUTIONS: High BP. Previous tobacco use (5 per day). R knee scope, meniscus repair on 9/17/21. NWBing on R LE for 6 weeks. Exercises/Interventions:     Therapeutic Exercise Resistance Sets/sec Reps Notes   Gastroc stretch w/strap Long sitting 30s 5    Hamstring stretch Long sitting 30s 5    Ankle pumps Long sitting, supine, or at EOB 1 30 HEP   Heelslides w/strap Supine 10s 10    Quad sets  10s 10    SLR flexion Supine 2 10 Done with NMES. See below. SAQ over bolster  5s 2x10    LAQ @EOB 0-60 2 10 No resistance. Focus on quad act, control   SLR abduction  2 10    SLR adduction  2 10                                  Therapeutic Activities                                                               Manual Intervention       R patella mobs - inferior, medial/lateral   5 min Gr. I-II   R knee PROM - EOB, supine   5 min                                NMR Re-education       NMES with SLR flexion 60 mA  8 min 10 sec on/20 sec off                                                             Access Code: [email or web address]  URL: [email or web address]  Date: 10/06/2021  Prepared by: Masood Clemons    Exercises  Sidelying Hip Abduction - 1-2 x daily - 7 x weekly - 2-3 sets - 10 reps  Sidelying Hip Adduction - 1-2 x daily - 7 x weekly - 2-3 sets - 10 reps      Therapeutic Exercise and NMR EXR  [x] (70104) Provided verbal/tactile cueing for activities related to strengthening, flexibility, endurance, ROM for improvements in LE, proximal hip, and core control with self care, mobility, lifting, ambulation.   [x] (33742) Provided verbal/tactile cueing for activities related to improving balance, coordination, kinesthetic sense, posture, motor skill, proprioception  to assist with LE, proximal hip, and core control in self care, mobility, lifting, ambulation and eccentric single leg control. NMR and Therapeutic Activities:    [x] (23766 or 46202) Provided verbal/tactile cueing for activities related to improving balance, coordination, kinesthetic sense, posture, motor skill, proprioception and motor activation to allow for proper function of core, proximal hip and LE with self care and ADLs  [x] (32931) Gait Re-education- Provided training and instruction to the patient for proper LE, core and proximal hip recruitment and positioning and eccentric body weight control with ambulation re-education including up and down stairs     Home Exercise Program:    [x] (69427) Reviewed/Progressed HEP activities related to strengthening, flexibility, endurance, ROM of core, proximal hip and LE for functional self-care, mobility, lifting and ambulation/stair navigation   [x] (08580)Reviewed/Progressed HEP activities related to improving balance, coordination, kinesthetic sense, posture, motor skill, proprioception of core, proximal hip and LE for self care, mobility, lifting, and ambulation/stair navigation      Manual Treatments:  PROM / STM / Oscillations-Mobs:  G-I, II, III, IV (PA's, Inf., Post.)  [x] (53492) Provided manual therapy to mobilize LE, proximal hip and/or LS spine soft tissue/joints for the purpose of modulating pain, promoting relaxation,  increasing ROM, reducing/eliminating soft tissue swelling/inflammation/restriction, improving soft tissue extensibility and allowing for proper ROM for normal function with self care, mobility, lifting and ambulation. Modalities:  Game Ready to R knee for pain/inflammation/edema - 15 minutes.  10/13    Charges:  Timed Code Treatment Minutes: 45   Total Treatment Minutes: 60       [] EVAL (LOW) 36017 (typically 20 minutes face-to-face)  [] EVAL (MOD) 47284 (typically 30 minutes face-to-face)  [] EVAL (HIGH) 46560 (typically 45 minutes face-to-face)  [] RE-EVAL     [x] AT(36848) x 1    [] IONTO (68895)  [x] NMR (08324) x 1    [] VASO (95193)  [x] Manual (03585) x 1    [] Other:  [] TA (42072)x    [] Mech Traction (69025)  [] ES(attended) (24206)     [] ES (un) (94104):     GOALS:  Patient stated goal: Be able to do normal activities. [x] Progressing: [] Met: [] Not Met: [] Adjusted    Therapist goals for Patient:   Short Term Goals: To be achieved in: 2 weeks  1. Independent in HEP and progression per patient tolerance, in order to prevent re-injury. []? Progressing: []? Met: []? Not Met: []? Adjusted  2. Patient will have a decrease in pain to facilitate improvement in movement, function, and ADLs as indicated by Functional Deficits. []? Progressing: []? Met: []? Not Met: []? Adjusted     Long Term Goals: To be achieved in: 8 weeks  1. Disability index score of 20% or less for the LEFS to assist with reaching prior level of function. []? Progressing: []? Met: []? Not Met: []? Adjusted  2. Patient will demonstrate increased AROM to 0-120 to allow for proper joint functioning as indicated by patients Functional Deficits. []? Progressing: []? Met: []? Not Met: []? Adjusted  3. Patient will demonstrate an increase in strength to good proximal hip strength and control, within 5lb HHD in LE to allow for proper functional mobility as indicated by patients Functional Deficits. []? Progressing: []? Met: []? Not Met: []? Adjusted  4. Patient will demonstrate normal gait mechanics without increased symptoms or restriction to allow him to walk and perform all daily mobility. []? Progressing: []? Met: []? Not Met: []? Adjusted  5. Patient will be able to navigate stairs up/down with reciprocal gait mechanics without increased symptoms or restriction to allow him normal household mobility without restricted access to certain parts of his home. []? Progressing: []? Met: []? Not Met: []?  Adjusted Progression Towards Functional goals:  [x] Patient is progressing as expected towards functional goals listed. [] Progression is slowed due to complexities listed. [] Progression has been slowed due to co-morbidities. [] Plan just implemented, too soon to assess goals progression  [] Other:     ASSESSMENT:    3 weeks post-op. Patient's POC is pretty restricted at this time due to [address] status on post-op R knee and due to same injury and pain recently occurring on the uninvolved L knee requiring the same surgery on the L knee in the near future. POC focused on R knee ROM and OKC quad activation and hip strengthening tasks in accordance with surgical restrictions and patient tolerance as weight bearing is limited on L LE at this time due to pain. Return to Play: (if applicable)   []  Stage 1: Intro to Strength   []  Stage 2: Return to Run and Strength   []  Stage 3: Return to Jump and Strength   []  Stage 4: Dynamic Strength and Agility   []  Stage 5: Sport Specific Training     []  Ready to Return to Play, Meets All Above Stages   []  Not Ready for Return to Sports   Comments:            Treatment/Activity Tolerance:  [x] Patient tolerated treatment well [] Patient limited by fatique  [] Patient limited by pain  [] Patient limited by other medical complications  [] Other:     Overall Progression Towards Functional goals/ Treatment Progress Update:  [x] Patient is progressing as expected towards functional goals listed. [] Progression is slowed due to complexities/Impairments listed. [] Progression has been slowed due to co-morbidities.   [] Plan just implemented, too soon to assess goals progression <30days   [] Goals require adjustment due to lack of progress  [] Patient is not progressing as expected and requires additional follow up with physician  [] Other    Prognosis for POC: [x] Good [] Fair  [] Poor    Patient requires continued skilled intervention: [x] Yes  [] No        PLAN: See eval  [x] Continue per plan of care [] Alter current plan (see comments)  [] Plan of care initiated [] Hold pending MD visit [] Discharge    Electronically signed by: Hansa Valdez, PT, DPT, MS, SCS    Note: If patient does not return for scheduled/recommended follow up visits, this note will serve as a discharge from care along with the most recent update on progress.

## 2021-10-15 ENCOUNTER — CLINICAL DOCUMENTATION (OUTPATIENT)
Dept: OTHER | Age: 61
End: 2021-10-15

## 2021-10-18 ENCOUNTER — HOSPITAL ENCOUNTER (OUTPATIENT)
Dept: PHYSICAL THERAPY | Age: 61
Setting detail: THERAPIES SERIES
Discharge: HOME | End: 2021-10-18
Payer: COMMERCIAL

## 2021-10-18 PROCEDURE — 97112 NEUROMUSCULAR REEDUCATION: CPT

## 2021-10-18 PROCEDURE — 97140 MANUAL THERAPY 1/> REGIONS: CPT

## 2021-10-18 PROCEDURE — 97110 THERAPEUTIC EXERCISES: CPT

## 2021-10-18 NOTE — FLOWSHEET NOTE
[facility]    Physical Therapy Treatment Note/ Progress Report:     Date:  10/18/2021    Patient Name:  Yosef York    :  1960  MRN: [id number]  Medical/Treatment Diagnosis Information:  · Diagnosis: M23.306 Other meniscus derangements, unspecified meniscus, R knee; M25.561 R knee pain  · Treatment Diagnosis: M23.306 Other meniscus derangements, unspecified meniscus, R knee; M25.561 R knee pain  Insurance/Certification information:  PT Insurance Information: [facility]  Physician Information:  Referring Practitioner: Claude Richters  Plan of care signed (Y/N):     Date of Patient follow up with Physician: 10/27     Progress Report: []  Yes  [x]  No     Functional Scale: 75% disability - LEFS ()   Date: 21    Date Range for reporting period:  Beginnin21  Endin days or 10 visits    Progress report due (10 Rx/or 30 days whichever is less): 10/20/21 Progress note nv. Recertification due (POC duration/ or 90 days whichever is less): 10/20/21     Visit # Insurance Allowable Auth Needed   10 [facility] []Yes    [x]No     Pain level:  0/10 at start of session; 1-2/10 at worst.      SUBJECTIVE:    R knee has been feeling good. Minimal to no pain. L knee is definitely hurting the worst of the two.  Patient thinks Dr. Cyndi Bernal will want to repair the L meniscal root tear at around 8-10 weeks post-op on the R knee after she has had a chance to start putting some weight through the R.     OBJECTIVE: 21   Observation:    Test measurements:    HIP Abd       HIP Ext       HIP IR       HIP ER       Knee ext -9 lacking 0   Knee Flex 90 120   Ankle PF       Ankle DF       Ankle In       Ankle Ev       Strength  LEFT RIGHT   HIP Flexors       HIP Abductors     HIP Ext       Hip ER       Knee EXT (quad)     Knee Flex (HS)     Ankle DF       Ankle PF       Ankle Inv       Ankle EV               Circumference  Mid apex  7 cm prox        44.0 cm  50.0 cm     45.5 cm  50.5 cm     Gait:  Patient came in in wheelchair carrying rolling walker because she attempted use of axillary crutches and felt a similar pop and pain on her uninvolved L LE.    RESTRICTIONS/PRECAUTIONS: High BP. Previous tobacco use (5 per day). R knee scope, meniscus repair on 9/17/21. NWBing on R LE for 6 weeks. Exercises/Interventions:     Therapeutic Exercise Resistance Sets/sec Reps Notes   Gastroc stretch w/strap Long sitting 30s 5    Hamstring stretch Long sitting 30s 5    Ankle pumps Long sitting, supine, or at EOB 1 30 HEP   Heelslides w/strap Supine 10s 10    Quad sets  10s 10    SLR flexion Supine 2 10 Done with NMES. See below. SAQ over bolster  5s 2x10    LAQ @EOB 0-60 2 10 No resistance. Focus on quad act, control   SLR abduction 2# 2 10    SLR adduction 2# 2 10                                  Therapeutic Activities                                                               Manual Intervention       R patella mobs - inferior, medial/lateral   5 min Gr. I-II   R knee PROM - EOB, supine   5 min                                NMR Re-education       NMES with SLR flexion 60 mA  8 min 10 sec on/20 sec off                                                             Access Code: [email or web address]  URL: [email or web address]  Date: 10/06/2021  Prepared by: Brian Jaramillo    Exercises  Sidelying Hip Abduction - 1-2 x daily - 7 x weekly - 2-3 sets - 10 reps  Sidelying Hip Adduction - 1-2 x daily - 7 x weekly - 2-3 sets - 10 reps      Therapeutic Exercise and NMR EXR  [x] (27695) Provided verbal/tactile cueing for activities related to strengthening, flexibility, endurance, ROM for improvements in LE, proximal hip, and core control with self care, mobility, lifting, ambulation.   [x] (45666) Provided verbal/tactile cueing for activities related to improving balance, coordination, kinesthetic sense, posture, motor skill, proprioception  to assist with LE, proximal hip, and core control in self care, mobility, lifting, ambulation and eccentric single leg control. NMR and Therapeutic Activities:    [x] (97187 or 92142) Provided verbal/tactile cueing for activities related to improving balance, coordination, kinesthetic sense, posture, motor skill, proprioception and motor activation to allow for proper function of core, proximal hip and LE with self care and ADLs  [x] (40121) Gait Re-education- Provided training and instruction to the patient for proper LE, core and proximal hip recruitment and positioning and eccentric body weight control with ambulation re-education including up and down stairs     Home Exercise Program:    [x] (87367) Reviewed/Progressed HEP activities related to strengthening, flexibility, endurance, ROM of core, proximal hip and LE for functional self-care, mobility, lifting and ambulation/stair navigation   [x] (30617)Reviewed/Progressed HEP activities related to improving balance, coordination, kinesthetic sense, posture, motor skill, proprioception of core, proximal hip and LE for self care, mobility, lifting, and ambulation/stair navigation      Manual Treatments:  PROM / STM / Oscillations-Mobs:  G-I, II, III, IV (PA's, Inf., Post.)  [x] (50991) Provided manual therapy to mobilize LE, proximal hip and/or LS spine soft tissue/joints for the purpose of modulating pain, promoting relaxation,  increasing ROM, reducing/eliminating soft tissue swelling/inflammation/restriction, improving soft tissue extensibility and allowing for proper ROM for normal function with self care, mobility, lifting and ambulation. Modalities:  Game Ready to R knee for pain/inflammation/edema - 15 minutes.  10/18    Charges:  Timed Code Treatment Minutes: 45   Total Treatment Minutes: 60       [] EVAL (LOW) 90753 (typically 20 minutes face-to-face)  [] EVAL (MOD) 36417 (typically 30 minutes face-to-face)  [] EVAL (HIGH) 08856 (typically 45 minutes face-to-face)  [] RE-EVAL     [x] PT(01514) x 1 [] IONTO (49745)  [x] NMR (36643) x 1    [] VASO (51224)  [x] Manual (03866) x 1    [] Other:  [] TA (26238)x    [] Mech Traction (64382)  [] ES(attended) (15787)     [] ES (un) (04415):     GOALS:  Patient stated goal: Be able to do normal activities. [x] Progressing: [] Met: [] Not Met: [] Adjusted    Therapist goals for Patient:   Short Term Goals: To be achieved in: 2 weeks  1. Independent in HEP and progression per patient tolerance, in order to prevent re-injury. []? Progressing: []? Met: []? Not Met: []? Adjusted  2. Patient will have a decrease in pain to facilitate improvement in movement, function, and ADLs as indicated by Functional Deficits. []? Progressing: []? Met: []? Not Met: []? Adjusted     Long Term Goals: To be achieved in: 8 weeks  1. Disability index score of 20% or less for the LEFS to assist with reaching prior level of function. []? Progressing: []? Met: []? Not Met: []? Adjusted  2. Patient will demonstrate increased AROM to 0-120 to allow for proper joint functioning as indicated by patients Functional Deficits. []? Progressing: []? Met: []? Not Met: []? Adjusted  3. Patient will demonstrate an increase in strength to good proximal hip strength and control, within 5lb HHD in LE to allow for proper functional mobility as indicated by patients Functional Deficits. []? Progressing: []? Met: []? Not Met: []? Adjusted  4. Patient will demonstrate normal gait mechanics without increased symptoms or restriction to allow him to walk and perform all daily mobility. []? Progressing: []? Met: []? Not Met: []? Adjusted  5. Patient will be able to navigate stairs up/down with reciprocal gait mechanics without increased symptoms or restriction to allow him normal household mobility without restricted access to certain parts of his home. []? Progressing: []? Met: []? Not Met: []?  Adjusted           Progression Towards Functional goals:  [x] Patient is progressing as expected towards functional goals listed. [] Progression is slowed due to complexities listed. [] Progression has been slowed due to co-morbidities. [] Plan just implemented, too soon to assess goals progression  [] Other:     ASSESSMENT:    4 weeks post-op. Patient achieving 106 degrees of R knee ROM passively pain free. POC focused on R knee ROM and OKC quad activation and hip strengthening tasks in accordance with surgical restrictions and patient tolerance as weight bearing is limited on L LE at this time due to pain. Return to Play: (if applicable)   []  Stage 1: Intro to Strength   []  Stage 2: Return to Run and Strength   []  Stage 3: Return to Jump and Strength   []  Stage 4: Dynamic Strength and Agility   []  Stage 5: Sport Specific Training     []  Ready to Return to Play, Meets All Above Stages   []  Not Ready for Return to Sports   Comments:            Treatment/Activity Tolerance:  [x] Patient tolerated treatment well [] Patient limited by fatique  [] Patient limited by pain  [] Patient limited by other medical complications  [] Other:     Overall Progression Towards Functional goals/ Treatment Progress Update:  [x] Patient is progressing as expected towards functional goals listed. [] Progression is slowed due to complexities/Impairments listed. [] Progression has been slowed due to co-morbidities. [] Plan just implemented, too soon to assess goals progression <30days   [] Goals require adjustment due to lack of progress  [] Patient is not progressing as expected and requires additional follow up with physician  [] Other    Prognosis for POC: [x] Good [] Fair  [] Poor    Patient requires continued skilled intervention: [x] Yes  [] No        PLAN:  Progress note nv.   [x] Continue per plan of care [] Alter current plan (see comments)  [] Plan of care initiated [] Hold pending MD visit [] Discharge    Electronically signed by: Nazario Bates, PT, DPT, MS, SCS    Note: If patient does not return for

## 2021-10-20 ENCOUNTER — HOSPITAL ENCOUNTER (OUTPATIENT)
Dept: PHYSICAL THERAPY | Age: 61
Setting detail: THERAPIES SERIES
Discharge: HOME | End: 2021-10-20
Payer: COMMERCIAL

## 2021-10-20 PROCEDURE — 97140 MANUAL THERAPY 1/> REGIONS: CPT

## 2021-10-20 PROCEDURE — 97112 NEUROMUSCULAR REEDUCATION: CPT

## 2021-10-20 PROCEDURE — 97110 THERAPEUTIC EXERCISES: CPT

## 2021-10-20 NOTE — PLAN OF CARE
Ben, [facility]      Physical Therapy Re-Certification Plan of Care/MD UPDATE      Dear Dr. Tam Allison,    We had the pleasure of treating the following patient for physical therapy services at [address] [place] [address]. A summary of our findings can be found in the updated assessment below. This includes our plan of care. If you have any questions or concerns regarding these findings, please do not hesitate to contact me at the office phone number checked above. Thank you for the referral.     Physician Signature:________________________________Date:__________________  By signing above (or electronic signature), therapists plan is approved by physician    Date Range Of Visits: 9/20/21 to 10/20/21  Total Visits to Date: 11  Overall Response to Treatment:   [x]Patient is responding well to treatment and improvement is noted with regards  to goals   []Patient should continue to improve in reasonable time if they continue HEP   []Patient has plateaued and is no longer responding to skilled PT intervention    []Patient is getting worse and would benefit from return to referring MD   []Patient unable to adhere to initial POC    [x]Other: 4 weeks post-op this week. Will be 5 weeks post-op this Friday 10/22. Patient consistently achieving 0-105 R knee ROM pain free without overpressure into end ranges. R quad activation/strength is improving. No quad lag noted with SLR and patient is able to perform LAQ at edge of bed from 0-90 degrees ROM actively pain free. POC is still very restricted per meniscal root repair surgical protocol which requires NWBing status on surgical limb until 6 weeks post-op. POC focused on R knee ROM and OKC quad activation and hip strengthening tasks.  Weight bearing is limited on L LE at this time due to pain following similar pivoting/twisting injury followed by a pop on the L LE resulting in a meniscal root tear on the L LE as well. Patient mobility is primarily through a wheelchair at this time to maintain [address] restrictions on the R and to patient is not tolerating much weight bearing on the L LE at this time. Patient will continue to benefit from skilled PT for full, safe return to PLOF pain free and without restrictions. Physical Therapy Treatment Note/ Progress Report:     Date:  10/20/2021    Patient Name:  Fabio Navarro    :  1960  MRN: [id number]  Medical/Treatment Diagnosis Information:  Diagnosis: M23.306 Other meniscus derangements, unspecified meniscus, R knee; M25.561 R knee pain  Treatment Diagnosis: M23.306 Other meniscus derangements, unspecified meniscus, R knee; M25.561 R knee pain  Insurance/Certification information:  PT Insurance Information: [facility]  Physician Information:  Referring Practitioner: Elizabeth Rangel  Plan of care signed (Y/N):     Date of Patient follow up with Physician: 10/27     Progress Report: [x]  Yes  []  No     Functional Scale: 80% disability - LEFS ()   Date: 10/20/21    Date Range for reporting period:  Beginnin21  Endin days or 10 visits    Progress report due (10 Rx/or 30 days whichever is less):      Recertification due (POC duration/ or 90 days whichever is less): 21     Visit # [address] []Yes    [x]No     Pain level:  0/10 at start of session; 1-2/10 at worst.      SUBJECTIVE:    R knee has been feeling good. Minimal to no R knee pain. L knee is definitely hurting the worst of the two. Sees Dr. Lynsey Ramirez for follow up in one week exactly (next Wednesday 10/27).     OBJECTIVE: 10/20/21  Observation:   Test measurements:    HIP Abd      HIP Ext       HIP IR       HIP ER       Knee ext 0 0   Knee Flex 117/120 p! 105   Ankle PF       Ankle DF       Ankle In       Ankle Ev       Strength  LEFT RIGHT   HIP Flexors  4-/5 4-/5    HIP Abductors 4-/5 4-/5   HIP Ext       Hip ER       Knee EXT (quad) 3+/5 p! 4-/5   No strengthening, flexibility, endurance, ROM for improvements in LE, proximal hip, and core control with self care, mobility, lifting, ambulation. [x] (80386) Provided verbal/tactile cueing for activities related to improving balance, coordination, kinesthetic sense, posture, motor skill, proprioception  to assist with LE, proximal hip, and core control in self care, mobility, lifting, ambulation and eccentric single leg control. NMR and Therapeutic Activities:    [x] (78632 or 12025) Provided verbal/tactile cueing for activities related to improving balance, coordination, kinesthetic sense, posture, motor skill, proprioception and motor activation to allow for proper function of core, proximal hip and LE with self care and ADLs  [x] (70882) Gait Re-education- Provided training and instruction to the patient for proper LE, core and proximal hip recruitment and positioning and eccentric body weight control with ambulation re-education including up and down stairs     Home Exercise Program:    [x] (51235) Reviewed/Progressed HEP activities related to strengthening, flexibility, endurance, ROM of core, proximal hip and LE for functional self-care, mobility, lifting and ambulation/stair navigation   [x] (99437)Reviewed/Progressed HEP activities related to improving balance, coordination, kinesthetic sense, posture, motor skill, proprioception of core, proximal hip and LE for self care, mobility, lifting, and ambulation/stair navigation      Manual Treatments:  PROM / STM / Oscillations-Mobs:  G-I, II, III, IV (PA's, Inf., Post.)  [x] (10319) Provided manual therapy to mobilize LE, proximal hip and/or LS spine soft tissue/joints for the purpose of modulating pain, promoting relaxation,  increasing ROM, reducing/eliminating soft tissue swelling/inflammation/restriction, improving soft tissue extensibility and allowing for proper ROM for normal function with self care, mobility, lifting and ambulation.      Modalities: normal gait mechanics without increased symptoms or restriction to allow him to walk and perform all daily mobility. [x] Progressing: [] Met: [] Not Met: [] Adjusted  Comment:  Still restricted to [address] on the R LE. Unable to tolerate much weight bearing on the L due to same injury on the L knee. 5. Patient will be able to navigate stairs up/down with reciprocal gait mechanics without increased symptoms or restriction to allow him normal household mobility without restricted access to certain parts of his home. [] Progressing: [] Met: [x] Not Met: [] Adjusted           Progression Towards Functional goals:  [x] Patient is progressing as expected towards functional goals listed. [] Progression is slowed due to complexities listed. [] Progression has been slowed due to co-morbidities. [] Plan just implemented, too soon to assess goals progression  [] Other:     ASSESSMENT:    4 weeks post-op this week. Will be 5 weeks post-op this Friday 10/22. Patient consistently achieving 0-105 R knee ROM pain free without overpressure into end ranges. R quad activation/strength is improving. No quad lag noted with SLR and patient is able to perform LAQ at edge of bed from 0-90 degrees ROM actively pain free. POC is still very restricted per meniscal root repair surgical protocol which requires NWBing status on surgical limb until 6 weeks post-op. POC focused on R knee ROM and OKC quad activation and hip strengthening tasks. Weight bearing is limited on L LE at this time due to pain following similar pivoting/twisting injury followed by a pop on the L LE resulting in a meniscal root tear on the L LE as well. Patient mobility is primarily through a wheelchair at this time to maintain [address] restrictions on the R and to patient is not tolerating much weight bearing on the L LE at this time. Patient will continue to benefit from skilled PT for full, safe return to PLOF pain free and without restrictions.       Return to Play: (if applicable)   []  Stage 1: Intro to Strength   []  Stage 2: Return to Run and Strength   []  Stage 3: Return to Jump and Strength   []  Stage 4: Dynamic Strength and Agility   []  Stage 5: Sport Specific Training     []  Ready to Return to Play, Meets All Above Stages   []  Not Ready for Return to Sports   Comments:            Treatment/Activity Tolerance:  [x] Patient tolerated treatment well [] Patient limited by fatique  [] Patient limited by pain  [] Patient limited by other medical complications  [] Other:     Overall Progression Towards Functional goals/ Treatment Progress Update:  [x] Patient is progressing as expected towards functional goals listed. [] Progression is slowed due to complexities/Impairments listed. [] Progression has been slowed due to co-morbidities. [] Plan just implemented, too soon to assess goals progression <30days   [] Goals require adjustment due to lack of progress  [] Patient is not progressing as expected and requires additional follow up with physician  [] Other    Prognosis for POC: [x] Good [] Fair  [] Poor    Patient requires continued skilled intervention: [x] Yes  [] No        PLAN:   [x] Continue per plan of care [] Alter current plan (see comments)  [] Plan of care initiated [] Hold pending MD visit [] Discharge    Electronically signed by: Moni Starr, PT, DPT, MS, SCS    Note: If patient does not return for scheduled/recommended follow up visits, this note will serve as a discharge from care along with the most recent update on progress.

## 2021-10-25 ENCOUNTER — HOSPITAL ENCOUNTER (OUTPATIENT)
Dept: PHYSICAL THERAPY | Age: 61
Setting detail: THERAPIES SERIES
Discharge: HOME | End: 2021-10-25
Payer: COMMERCIAL

## 2021-10-25 PROCEDURE — 97110 THERAPEUTIC EXERCISES: CPT

## 2021-10-25 PROCEDURE — 97140 MANUAL THERAPY 1/> REGIONS: CPT

## 2021-10-25 PROCEDURE — 97112 NEUROMUSCULAR REEDUCATION: CPT

## 2021-10-25 NOTE — FLOWSHEET NOTE
[address]    Physical Therapy Treatment Note/ Progress Report:     Date:  10/25/2021    Patient Name:  Susy Rubio    :  1960  MRN: [id number]  Medical/Treatment Diagnosis Information:  · Diagnosis: M23.306 Other meniscus derangements, unspecified meniscus, R knee; M25.561 R knee pain  · Treatment Diagnosis: M23.306 Other meniscus derangements, unspecified meniscus, R knee; M25.561 R knee pain  Insurance/Certification information:  PT Insurance Information: [facility]  Physician Information:  Referring Practitioner: Edinson Romero  Plan of care signed (Y/N):     Date of Patient follow up with Physician: 10/27     Progress Report: []  Yes  [x]  No     Functional Scale: 80% disability - LEFS ()   Date: 10/20/21    Date Range for reporting period:  Beginnin21  Endin days or 10 visits    Progress report due (10 Rx/or 30 days whichever is less):      Recertification due (POC duration/ or 90 days whichever is less): 21     Visit # Insurance Allowable Auth Needed   [facility] []Yes    [x]No     Pain level:  0/10 at start of session; 1-2/10 at worst.      SUBJECTIVE:    R knee has been feeling good. Minimal to no R knee pain. L knee is definitely hurting the worst of the two. Sees Dr. Satya Silva for follow up this Wednesday to determine planning and timing for L knee scope with meniscal repair. OBJECTIVE: 10/20/21   Observation:    Test measurements:    HIP Abd      HIP Ext       HIP IR       HIP ER       Knee ext 0 0   Knee Flex 117/120 p! 105   Ankle PF       Ankle DF       Ankle In       Ankle Ev       Strength  LEFT RIGHT   HIP Flexors  4-/5 4-/5    HIP Abductors 4-/5 4-/5   HIP Ext       Hip ER       Knee EXT (quad) 3+/5 p! 4-/5   No quad lag present with SLR. Able to move actively through LAQ ROM from 0-90 pain free at EOB.    Knee Flex (HS) 4-/5 p! 3/5   Ankle DF       Ankle PF       Ankle Inv       Ankle EV Circumference  Mid apex  7 cm prox        44.0 cm  50.0 cm     45.5 cm  50.5 cm     Gait:  Patient using wheelchair currently for all mobility due to injuring L LE in the same way as the R during a pivot turn. Patient having more pain and discomfort on L LE, so unable to use rolling walker to maintain NWBing status on R LE like originally planned. RESTRICTIONS/PRECAUTIONS: High BP. Previous tobacco use (5 per day). R knee scope, meniscus repair on 9/17/21. NWBing on R LE for 6 weeks. Exercises/Interventions:     Therapeutic Exercise Resistance Sets/sec Reps Notes   Gastroc stretch w/strap Long sitting 30s 5    Hamstring stretch Long sitting 30s 5    Ankle pumps Long sitting, supine, or at EOB 1 30 HEP   Heelslides w/strap Supine 10s 10    Quad sets  10s 10    SLR flexion Supine 2 10 Done with NMES. See below. SAQ over bolster  5s 2x10    LAQ @EOB 0-60 2 10 No resistance. Focus on quad act, control   SLR abduction 2# 2 10    SLR adduction 2# 2 10                                  Therapeutic Activities                                                               Manual Intervention       R patella mobs - inferior, medial/lateral   5 min Gr. I-II   R knee PROM - EOB, supine   5 min                                NMR Re-education       NMES with SLR flexion 60 mA  8 min 10 sec on/20 sec off                                                             Access Code: [email or web address]  URL: [email or web address]  Date: 10/06/2021  Prepared by: Brian Jaramillo    Exercises  Sidelying Hip Abduction - 1-2 x daily - 7 x weekly - 2-3 sets - 10 reps  Sidelying Hip Adduction - 1-2 x daily - 7 x weekly - 2-3 sets - 10 reps      Therapeutic Exercise and NMR EXR  [x] (38794) Provided verbal/tactile cueing for activities related to strengthening, flexibility, endurance, ROM for improvements in LE, proximal hip, and core control with self care, mobility, lifting, ambulation.   [x] (57540) Provided verbal/tactile cueing for activities related to improving balance, coordination, kinesthetic sense, posture, motor skill, proprioception  to assist with LE, proximal hip, and core control in self care, mobility, lifting, ambulation and eccentric single leg control. NMR and Therapeutic Activities:    [x] (30150 or 97038) Provided verbal/tactile cueing for activities related to improving balance, coordination, kinesthetic sense, posture, motor skill, proprioception and motor activation to allow for proper function of core, proximal hip and LE with self care and ADLs  [x] (42976) Gait Re-education- Provided training and instruction to the patient for proper LE, core and proximal hip recruitment and positioning and eccentric body weight control with ambulation re-education including up and down stairs     Home Exercise Program:    [x] (02158) Reviewed/Progressed HEP activities related to strengthening, flexibility, endurance, ROM of core, proximal hip and LE for functional self-care, mobility, lifting and ambulation/stair navigation   [x] (65457)Reviewed/Progressed HEP activities related to improving balance, coordination, kinesthetic sense, posture, motor skill, proprioception of core, proximal hip and LE for self care, mobility, lifting, and ambulation/stair navigation      Manual Treatments:  PROM / STM / Oscillations-Mobs:  G-I, II, III, IV (PA's, Inf., Post.)  [x] (45291) Provided manual therapy to mobilize LE, proximal hip and/or LS spine soft tissue/joints for the purpose of modulating pain, promoting relaxation,  increasing ROM, reducing/eliminating soft tissue swelling/inflammation/restriction, improving soft tissue extensibility and allowing for proper ROM for normal function with self care, mobility, lifting and ambulation. Modalities:  Game Ready to R knee for pain/inflammation/edema - 10 minutes.  10/25    Charges:  Timed Code Treatment Minutes: 45   Total Treatment Minutes: 55       [] EVAL (LOW) 62489 (typically 20 minutes face-to-face)  [] EVAL (MOD) 83849 (typically 30 minutes face-to-face)  [] EVAL (HIGH) 35791 (typically 45 minutes face-to-face)  [] RE-EVAL     [x] XN(52001) x 1    [] IONTO (47570)  [x] NMR (75745) x 1    [] VASO (06479)  [x] Manual (02144) x 1    [] Other:  [] TA (37503)x    [] Mech Traction (00261)  [] ES(attended) (28341)     [] ES (un) (76707):     GOALS:  Patient stated goal: Be able to do normal activities. [x] Progressing: [] Met: [] Not Met: [] Adjusted    Therapist goals for Patient:   Short Term Goals: To be achieved in: 2 weeks  1. Independent in HEP and progression per patient tolerance, in order to prevent re-injury. [] Progressing: [x] Met: [] Not Met: [] Adjusted  2. Patient will have a decrease in pain to facilitate improvement in movement, function, and ADLs as indicated by Functional Deficits. [x] Progressing: [] Met: [] Not Met: [] Adjusted     Long Term Goals: To be achieved in: 8 weeks  1. Disability index score of 20% or less for the LEFS to assist with reaching prior level of function. [x] Progressing: [] Met: [] Not Met: [] Adjusted Comment:  80% disability on LEFS as of 10/20/21  2. Patient will demonstrate increased AROM to 0-120 to allow for proper joint functioning as indicated by patients Functional Deficits. [x] Progressing: [] Met: [] Not Met: [] Adjusted  Comment:  0-105 at 5 weeks post-op. ROM still restricted per protocol. 3. Patient will demonstrate an increase in strength to good proximal hip strength and control, within 5lb HHD in LE to allow for proper functional mobility as indicated by patients Functional Deficits. [x] Progressing: [] Met: [] Not Met: [] Adjusted Comment:  No quad lag with SLR. Able to perform LAQ at EOB 0-90 actively pain free. 4. Patient will demonstrate normal gait mechanics without increased symptoms or restriction to allow him to walk and perform all daily mobility.    [x] Progressing: [] Met: [] Not Met: [] Adjusted  Comment:  Still restricted to [address] on the R LE. Unable to tolerate much weight bearing on the L due to same injury on the L knee. 5. Patient will be able to navigate stairs up/down with reciprocal gait mechanics without increased symptoms or restriction to allow him normal household mobility without restricted access to certain parts of his home. [] Progressing: [] Met: [x] Not Met: [] Adjusted           Progression Towards Functional goals:  [x] Patient is progressing as expected towards functional goals listed. [] Progression is slowed due to complexities listed. [] Progression has been slowed due to co-morbidities. [] Plan just implemented, too soon to assess goals progression  [] Other:     ASSESSMENT:   5 weeks post-op last Friday 10/22. Patient achieving 0-110 R knee ROM pain free without overpressure into end ranges. Continued session focus on OKC quad activation and hip strengthening tasks in accordance with surgical restrictions with weight bearing as tolerated on L LE, which is not much at this time due to L knee pain with WBing. Patient to see Dr. Edmond Kramer for follow up this Wednesday to determine planning and timing of L knee scope and meniscal repair. Patient will continue to benefit from skilled PT for full, safe return to PLOF pain free and without restrictions.       Return to Play: (if applicable)   []  Stage 1: Intro to Strength   []  Stage 2: Return to Run and Strength   []  Stage 3: Return to Jump and Strength   []  Stage 4: Dynamic Strength and Agility   []  Stage 5: Sport Specific Training     []  Ready to Return to Play, Meets All Above Stages   []  Not Ready for Return to Sports   Comments:            Treatment/Activity Tolerance:  [x] Patient tolerated treatment well [] Patient limited by fatique  [] Patient limited by pain  [] Patient limited by other medical complications  [] Other:     Overall Progression Towards Functional goals/ Treatment Progress Update:  [x] Patient is progressing as expected towards functional goals listed. [] Progression is slowed due to complexities/Impairments listed. [] Progression has been slowed due to co-morbidities. [] Plan just implemented, too soon to assess goals progression <30days   [] Goals require adjustment due to lack of progress  [] Patient is not progressing as expected and requires additional follow up with physician  [] Other    Prognosis for POC: [x] Good [] Fair  [] Poor    Patient requires continued skilled intervention: [x] Yes  [] No        PLAN:   [x] Continue per plan of care [] Alter current plan (see comments)  [] Plan of care initiated [] Hold pending MD visit [] Discharge    Electronically signed by: Judith Rosas PT, DPT, MS, SCS    Note: If patient does not return for scheduled/recommended follow up visits, this note will serve as a discharge from care along with the most recent update on progress.

## 2021-10-27 ENCOUNTER — OFFICE VISIT (OUTPATIENT)
Dept: ORTHOPEDIC SURGERY | Age: 61
End: 2021-10-27

## 2021-10-27 VITALS — HEIGHT: 69 IN | BODY MASS INDEX: 36.29 KG/M2 | WEIGHT: 245 LBS

## 2021-10-27 DIAGNOSIS — S83.242A ACUTE MEDIAL MENISCUS TEAR OF LEFT KNEE, INITIAL ENCOUNTER: ICD-10-CM

## 2021-10-27 DIAGNOSIS — Z01.818 PRE-OP TESTING: Primary | ICD-10-CM

## 2021-10-27 PROCEDURE — 99024 POSTOP FOLLOW-UP VISIT: CPT | Performed by: ORTHOPAEDIC SURGERY

## 2021-10-28 NOTE — PROGRESS NOTES
patient. We spent time reviewing the imaging findings of the left knee. Unfortunately she has sustained a meniscus root tear. Similar to the other side we discussed the biomechanical consequences of this type of tear. It does put her at increased risk for accelerated degeneration of the knee. However she is currently recovering from a right knee meniscus root repair. We both elected to proceed with left meniscus root repair in December. That will allow her to be weightbearing as tolerated and close to 3 months out from the right knee surgery. She will start to transition from the Eliquis at this point since she will start weightbearing on the right side. She will return to see me in about 4 weeks prior to surgery on the left side. Greater than 30 minutes were spent with this encounter. Time spent included evaluating the patient's chart prior to arrival.  Evaluating the patient in the office including history, physical examination, imaging reviewing, and counseling on next steps. Lastly, time was spent discussing orders with my staff as well as providing documentation in the chart. Electronically signed by Myranda Lees MD on 9/22/21 at 10:28 AM EDT     Disclaimer: This note was dictated with voice recognition software. Though review and correction are routine, we apologize for any errors.

## 2021-10-29 ENCOUNTER — HOSPITAL ENCOUNTER (OUTPATIENT)
Dept: PHYSICAL THERAPY | Age: 61
Setting detail: THERAPIES SERIES
Discharge: HOME | End: 2021-10-29
Payer: COMMERCIAL

## 2021-10-29 PROCEDURE — 97530 THERAPEUTIC ACTIVITIES: CPT

## 2021-10-29 PROCEDURE — 97110 THERAPEUTIC EXERCISES: CPT

## 2021-10-29 NOTE — FLOWSHEET NOTE
[facility]    Physical Therapy Treatment Note/ Progress Report:     Date:  10/29/2021    Patient Name:  Isa Humphries    :  1960  MRN: [id number]  Medical/Treatment Diagnosis Information:  · Diagnosis: M23.306 Other meniscus derangements, unspecified meniscus, R knee; M25.561 R knee pain  · Treatment Diagnosis: M23.306 Other meniscus derangements, unspecified meniscus, R knee; M25.561 R knee pain  Insurance/Certification information:  PT Insurance Information: [facility]  Physician Information:  Referring Practitioner: Letitia Seaman  Plan of care signed (Y/N):     Date of Patient follow up with Physician: 10/27     Progress Report: []  Yes  [x]  No     Functional Scale: 80% disability - LEFS ()   Date: 10/20/21    Date Range for reporting period:  Beginnin21  Endin days or 10 visits    Progress report due (10 Rx/or 30 days whichever is less):      Recertification due (POC duration/ or 90 days whichever is less): 21     Visit # Insurance Allowable Auth Needed   [address] []Yes    [x]No     Pain level:  0/10 at start of session; 1-2/10 at worst.      SUBJECTIVE:    Very excited to start putting weight on her R leg today. Just wants to be able to get up and go to the bathroom on her own. OBJECTIVE: 10/20/21   Observation:    Test measurements:    HIP Abd      HIP Ext       HIP IR       HIP ER       Knee ext 0 0   Knee Flex 117/120 p! 105   Ankle PF       Ankle DF       Ankle In       Ankle Ev       Strength  LEFT RIGHT   HIP Flexors  4-/5 4-/5    HIP Abductors 4-/5 4-/5   HIP Ext       Hip ER       Knee EXT (quad) 3+/5 p! 4-/5   No quad lag present with SLR. Able to move actively through LAQ ROM from 0-90 pain free at EOB.    Knee Flex (HS) 4-/5 p! 3/5   Ankle DF       Ankle PF       Ankle Inv       Ankle EV               Circumference  Mid apex  7 cm prox        44.0 cm  50.0 cm     45.5 cm  50.5 cm     Gait:  Patient using wheelchair currently for all mobility due to injuring L LE in the same way as the R during a pivot turn. Patient having more pain and discomfort on L LE, so unable to use rolling walker to maintain NWBing status on R LE like originally planned. RESTRICTIONS/PRECAUTIONS: High BP. Previous tobacco use (5 per day). R knee scope, meniscus repair on 9/17/21. NWBing on R LE for 6 weeks. Exercises/Interventions:     Therapeutic Exercise Resistance Sets/sec Reps Notes   Recumbent bike rocks   5 min Able to get full revolutions by the end   Gastroc stretch w/strap Long sitting 30s 5    Hamstring stretch Long sitting 30s 5    Ankle pumps Long sitting, supine, or at EOB 1 30 HEP   Heelslides w/strap Supine 10s 10    Quad sets  10s 10    SAQ over bolster 2# ankle weight 5s 3x10    LAQ @EOB 0-60 2 10    SLR flexion 2# 2 10    SLR abduction 2# 2 10    SLR adduction 2# 2 10                                  Therapeutic Activities       Weight shifts w/RW Side/side  Fwd/bwd 1 20 Each direction   TTWB gait w/RW  2 15 feet                                              Manual Intervention       R patella mobs - inferior, medial/lateral    Gr. I-II   R knee PROM - EOB, supine                                   NMR Re-education                                                                 Access Code: [email or web address]  URL: [email or web address]  Date: 10/06/2021  Prepared by: Andrez Guillen    Exercises  Sidelying Hip Abduction - 1-2 x daily - 7 x weekly - 2-3 sets - 10 reps  Sidelying Hip Adduction - 1-2 x daily - 7 x weekly - 2-3 sets - 10 reps      Therapeutic Exercise and NMR EXR  [x] (04068) Provided verbal/tactile cueing for activities related to strengthening, flexibility, endurance, ROM for improvements in LE, proximal hip, and core control with self care, mobility, lifting, ambulation.   [x] (88135) Provided verbal/tactile cueing for activities related to improving balance, coordination, kinesthetic sense, posture, motor skill, proprioception  to assist with LE, proximal hip, and core control in self care, mobility, lifting, ambulation and eccentric single leg control. NMR and Therapeutic Activities:    [x] (54758 or 87745) Provided verbal/tactile cueing for activities related to improving balance, coordination, kinesthetic sense, posture, motor skill, proprioception and motor activation to allow for proper function of core, proximal hip and LE with self care and ADLs  [x] (58991) Gait Re-education- Provided training and instruction to the patient for proper LE, core and proximal hip recruitment and positioning and eccentric body weight control with ambulation re-education including up and down stairs     Home Exercise Program:    [x] (95587) Reviewed/Progressed HEP activities related to strengthening, flexibility, endurance, ROM of core, proximal hip and LE for functional self-care, mobility, lifting and ambulation/stair navigation   [x] (77375)Reviewed/Progressed HEP activities related to improving balance, coordination, kinesthetic sense, posture, motor skill, proprioception of core, proximal hip and LE for self care, mobility, lifting, and ambulation/stair navigation      Manual Treatments:  PROM / STM / Oscillations-Mobs:  G-I, II, III, IV (PA's, Inf., Post.)  [x] (64834) Provided manual therapy to mobilize LE, proximal hip and/or LS spine soft tissue/joints for the purpose of modulating pain, promoting relaxation,  increasing ROM, reducing/eliminating soft tissue swelling/inflammation/restriction, improving soft tissue extensibility and allowing for proper ROM for normal function with self care, mobility, lifting and ambulation. Modalities:  Game Ready to R knee for pain/inflammation/edema - 10 minutes.  10/29    Charges:  Timed Code Treatment Minutes: 45   Total Treatment Minutes: 55       [] EVAL (LOW) 42290 (typically 20 minutes face-to-face)  [] EVAL (MOD) 33728 (typically 30 minutes face-to-face)  [] EVAL (HIGH) 63820 (typically 45 minutes face-to-face)  [] RE-EVAL     [x] UB(26356) x 2    [] IONTO (68390)  [] NMR (83577) x    [] VASO (34858)  [] Manual (46691) x     [] Other:  [x] TA (90147)x 1   [] Mech Traction (97194)  [] ES(attended) (15251)     [] ES (un) (55729):     GOALS:  Patient stated goal: Be able to do normal activities. [x] Progressing: [] Met: [] Not Met: [] Adjusted    Therapist goals for Patient:   Short Term Goals: To be achieved in: 2 weeks  1. Independent in HEP and progression per patient tolerance, in order to prevent re-injury. [] Progressing: [x] Met: [] Not Met: [] Adjusted  2. Patient will have a decrease in pain to facilitate improvement in movement, function, and ADLs as indicated by Functional Deficits. [x] Progressing: [] Met: [] Not Met: [] Adjusted     Long Term Goals: To be achieved in: 8 weeks  1. Disability index score of 20% or less for the LEFS to assist with reaching prior level of function. [x] Progressing: [] Met: [] Not Met: [] Adjusted Comment:  80% disability on LEFS as of 10/20/21  2. Patient will demonstrate increased AROM to 0-120 to allow for proper joint functioning as indicated by patients Functional Deficits. [x] Progressing: [] Met: [] Not Met: [] Adjusted  Comment:  0-105 at 5 weeks post-op. ROM still restricted per protocol. 3. Patient will demonstrate an increase in strength to good proximal hip strength and control, within 5lb HHD in LE to allow for proper functional mobility as indicated by patients Functional Deficits. [x] Progressing: [] Met: [] Not Met: [] Adjusted Comment:  No quad lag with SLR. Able to perform LAQ at EOB 0-90 actively pain free. 4. Patient will demonstrate normal gait mechanics without increased symptoms or restriction to allow him to walk and perform all daily mobility. [x] Progressing: [] Met: [] Not Met: [] Adjusted  Comment:  Still restricted to [address] on the R LE.  Unable to tolerate much weight bearing on the L due to Functional goals/ Treatment Progress Update:  [x] Patient is progressing as expected towards functional goals listed. [] Progression is slowed due to complexities/Impairments listed. [] Progression has been slowed due to co-morbidities. [] Plan just implemented, too soon to assess goals progression <30days   [] Goals require adjustment due to lack of progress  [] Patient is not progressing as expected and requires additional follow up with physician  [] Other    Prognosis for POC: [x] Good [] Fair  [] Poor    Patient requires continued skilled intervention: [x] Yes  [] No        PLAN:   [x] Continue per plan of care [] Alter current plan (see comments)  [] Plan of care initiated [] Hold pending MD visit [] Discharge    Electronically signed by: Judith Rosas PT, DPT, MS, SCS    Note: If patient does not return for scheduled/recommended follow up visits, this note will serve as a discharge from care along with the most recent update on progress.

## 2021-11-01 ENCOUNTER — HOSPITAL ENCOUNTER (OUTPATIENT)
Dept: PHYSICAL THERAPY | Age: 61
Setting detail: THERAPIES SERIES
Discharge: HOME | End: 2021-11-01
Payer: COMMERCIAL

## 2021-11-01 PROCEDURE — 97112 NEUROMUSCULAR REEDUCATION: CPT

## 2021-11-01 PROCEDURE — 97110 THERAPEUTIC EXERCISES: CPT

## 2021-11-01 NOTE — FLOWSHEET NOTE
LAQ ROM from 0-90 pain free at EOB. Knee Flex (HS) 4-/5 p! 3/5   Ankle DF       Ankle PF       Ankle Inv       Ankle EV               Circumference  Mid apex  7 cm prox        44.0 cm  50.0 cm     45.5 cm  50.5 cm     Gait:  Patient using wheelchair currently for all mobility due to injuring L LE in the same way as the R during a pivot turn. Patient having more pain and discomfort on L LE, so unable to use rolling walker to maintain NWBing status on R LE like originally planned. RESTRICTIONS/PRECAUTIONS: High BP. Previous tobacco use (5 per day). R knee scope, meniscus repair on 9/17/21. NWBing on R LE for 6 weeks. Exercises/Interventions:     Therapeutic Exercise Resistance Sets/sec Reps Notes   Recumbent bike rocks   5 min Able to get full revolutions by the end   Gastroc stretch w/strap Long sitting 30s 5    Hamstring stretch Long sitting 30s 5    Ankle pumps Long sitting, supine, or at EOB 1 30 HEP   Heelslides w/strap Supine 10s 10    Quad sets  10s 10    SAQ over bolster 3# ankle weight 5s 3x10    LAQ @EOB 0-45; 1# ankle weight 2 10 ROM restricted with step behind R LE   SLR flexion 2# 2 10    SLR abduction 2# 2 10    SLR adduction 2# 2 10                                  Therapeutic Activities                                                    Manual Intervention       R patella mobs - inferior, medial/lateral    Gr. I-II   R knee PROM - EOB, supine                                   NMR Re-education       [employer] - limits of stability - small [place] (beginner) lvl 8  4x    Mod. STEPHANIE nelson isos   nv    CC TKE   nv                                         Access Code: [email or web address]  URL: [email or web address] [email or web address]  Date: 10/06/2021  Prepared by: Susan Bennett    Exercises  Sidelying Hip Abduction - 1-2 x daily - 7 x weekly - 2-3 sets - 10 reps  Sidelying Hip Adduction - 1-2 x daily - 7 x weekly - 2-3 sets - 10 reps  *In addition to pre-op exercises including hamstring stretch, gastroc stretch, heelslides, quad sets, SLR flexion. Therapeutic Exercise and NMR EXR  [x] (65253) Provided verbal/tactile cueing for activities related to strengthening, flexibility, endurance, ROM for improvements in LE, proximal hip, and core control with self care, mobility, lifting, ambulation. [x] (78858) Provided verbal/tactile cueing for activities related to improving balance, coordination, kinesthetic sense, posture, motor skill, proprioception  to assist with LE, proximal hip, and core control in self care, mobility, lifting, ambulation and eccentric single leg control.      NMR and Therapeutic Activities:    [x] (77339 or 36865) Provided verbal/tactile cueing for activities related to improving balance, coordination, kinesthetic sense, posture, motor skill, proprioception and motor activation to allow for proper function of core, proximal hip and LE with self care and ADLs  [x] (95103) Gait Re-education- Provided training and instruction to the patient for proper LE, core and proximal hip recruitment and positioning and eccentric body weight control with ambulation re-education including up and down stairs     Home Exercise Program:    [x] (34491) Reviewed/Progressed HEP activities related to strengthening, flexibility, endurance, ROM of core, proximal hip and LE for functional self-care, mobility, lifting and ambulation/stair navigation   [x] (75019)Reviewed/Progressed HEP activities related to improving balance, coordination, kinesthetic sense, posture, motor skill, proprioception of core, proximal hip and LE for self care, mobility, lifting, and ambulation/stair navigation      Manual Treatments:  PROM / STM / Oscillations-Mobs:  G-I, II, III, IV (PA's, Inf., Post.)  [x] (39006) Provided manual therapy to mobilize LE, proximal hip and/or LS spine soft tissue/joints for the purpose of modulating pain, promoting relaxation,  increasing ROM, reducing/eliminating soft tissue swelling/inflammation/restriction, improving soft tissue extensibility and allowing for proper ROM for normal function with self care, mobility, lifting and ambulation. Modalities:  Game Ready to R knee for pain/inflammation/edema - 10 minutes. 11/1    Charges:  Timed Code Treatment Minutes: 45   Total Treatment Minutes: 55       [] EVAL (LOW) 80384 (typically 20 minutes face-to-face)   [] EVAL (MOD) 31061 (typically 30 minutes face-to-face)  [] EVAL (HIGH) 46804 (typically 45 minutes face-to-face)  [] RE-EVAL     [x] KL(50019) x 2    [] IONTO (23191)  [x] NMR (33539) x 1   [] VASO (88510)  [] Manual (93422) x     [] Other:  [] TA (38730)x    [] Mech Traction (36003)  [] ES(attended) (47669)     [] ES (un) (63649):     GOALS:  Patient stated goal: Be able to do normal activities. [x] Progressing: [] Met: [] Not Met: [] Adjusted    Therapist goals for Patient:   Short Term Goals: To be achieved in: 2 weeks  1. Independent in HEP and progression per patient tolerance, in order to prevent re-injury. [] Progressing: [x] Met: [] Not Met: [] Adjusted  2. Patient will have a decrease in pain to facilitate improvement in movement, function, and ADLs as indicated by Functional Deficits. [x] Progressing: [] Met: [] Not Met: [] Adjusted     Long Term Goals: To be achieved in: 8 weeks  1. Disability index score of 20% or less for the LEFS to assist with reaching prior level of function. [x] Progressing: [] Met: [] Not Met: [] Adjusted Comment:  80% disability on LEFS as of 10/20/21  2. Patient will demonstrate increased AROM to 0-120 to allow for proper joint functioning as indicated by patients Functional Deficits. [x] Progressing: [] Met: [] Not Met: [] Adjusted  Comment:  0-105 at 5 weeks post-op. ROM still restricted per protocol. 3. Patient will demonstrate an increase in strength to good proximal hip strength and control, within 5lb HHD in LE to allow for proper functional mobility as indicated by patients Functional Deficits.    [x] Progressing: [] Met: [] Not Met: [] Adjusted Comment:  No quad lag with SLR. Able to perform LAQ at EOB 0-90 actively pain free. 4. Patient will demonstrate normal gait mechanics without increased symptoms or restriction to allow him to walk and perform all daily mobility. [x] Progressing: [] Met: [] Not Met: [] Adjusted  Comment:  Still restricted to [address] on the R LE. Unable to tolerate much weight bearing on the L due to same injury on the L knee. 5. Patient will be able to navigate stairs up/down with reciprocal gait mechanics without increased symptoms or restriction to allow him normal household mobility without restricted access to certain parts of his home. [] Progressing: [] Met: [x] Not Met: [] Adjusted           Progression Towards Functional goals:  [x] Patient is progressing as expected towards functional goals listed. [] Progression is slowed due to complexities listed. [] Progression has been slowed due to co-morbidities. [] Plan just implemented, too soon to assess goals progression  [] Other:     ASSESSMENT:    R knee ROM 0-115 today. Tolerating weight bearing on R LE very well, but does have occasional achiness/soreness in R knee. No significant increase in R knee swelling/edema since initiation of weight bearing last session. Reports that she was able to stand up to shower over the weekend. Patient feels like ambulation is more difficult because she fatigues a lot easier now after being in the wheelchair for so long rather than because of R knee pain.      Return to Play: (if applicable)   []  Stage 1: Intro to Strength   []  Stage 2: Return to Run and Strength   []  Stage 3: Return to Jump and Strength   []  Stage 4: Dynamic Strength and Agility   []  Stage 5: Sport Specific Training     []  Ready to Return to Play, Meets All Above Stages   []  Not Ready for Return to Sports   Comments:            Treatment/Activity Tolerance:  [x] Patient tolerated treatment well [] Patient limited by elmer  [] Patient limited by pain  [] Patient limited by other medical complications  [] Other:     Overall Progression Towards Functional goals/ Treatment Progress Update:  [x] Patient is progressing as expected towards functional goals listed. [] Progression is slowed due to complexities/Impairments listed. [] Progression has been slowed due to co-morbidities. [] Plan just implemented, too soon to assess goals progression <30days   [] Goals require adjustment due to lack of progress  [] Patient is not progressing as expected and requires additional follow up with physician  [] Other    Prognosis for POC: [x] Good [] Fair  [] Poor    Patient requires continued skilled intervention: [x] Yes  [] No        PLAN: Progress weight bearing as sandie. [x] Continue per plan of care [] Alter current plan (see comments)  [] Plan of care initiated [] Hold pending MD visit [] Discharge    Electronically signed by: Yesenia Pena, PT, DPT, MS, SCS    Note: If patient does not return for scheduled/recommended follow up visits, this note will serve as a discharge from care along with the most recent update on progress.

## 2021-11-03 ENCOUNTER — HOSPITAL ENCOUNTER (OUTPATIENT)
Dept: PHYSICAL THERAPY | Age: 61
Setting detail: THERAPIES SERIES
Discharge: HOME | End: 2021-11-03
Payer: COMMERCIAL

## 2021-11-03 PROCEDURE — 97112 NEUROMUSCULAR REEDUCATION: CPT

## 2021-11-03 PROCEDURE — 97110 THERAPEUTIC EXERCISES: CPT

## 2021-11-03 PROCEDURE — 97016 VASOPNEUMATIC DEVICE THERAPY: CPT

## 2021-11-03 NOTE — FLOWSHEET NOTE
[address] [facility]    Physical Therapy Treatment Note/ Progress Report:     Date:  11/3/2021    Patient Name:  Rashida Dunham    :  1960  MRN: [id number]  Medical/Treatment Diagnosis Information:  · Diagnosis: M23.306 Other meniscus derangements, unspecified meniscus, R knee; M25.561 R knee pain  · Treatment Diagnosis: M23.306 Other meniscus derangements, unspecified meniscus, R knee; M25.561 R knee pain  Insurance/Certification information:  PT Insurance Information: [facility]  Physician Information:  Referring Practitioner: Boy Melo  Plan of care signed (Y/N):     Date of Patient follow up with Physician:      Progress Report: []  Yes  [x]  No     Functional Scale: 80% disability - LEFS ()   Date: 10/20/21    Date Range for reporting period:  Beginnin21  Endin days or 10 visits    Progress report due (10 Rx/or 30 days whichever is less):      Recertification due (POC duration/ or 90 days whichever is less): 21     Visit # Insurance Allowable Auth Needed   [id number] []Yes    [x]No     Pain level:  0/10 at start of session; 1-2/10 at worst.      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 OBJECTIVE: 10/20/21   Observation:    Test measurements:    HIP Abd      HIP Ext       HIP IR       HIP ER       Knee ext 0 0   Knee Flex 117/120 p! 105   Ankle PF       Ankle DF       Ankle In       Ankle Ev       Strength  LEFT RIGHT   HIP Flexors  4-/5 4-/5    HIP Abductors 4-/5 4-/5   HIP Ext       Hip ER       Knee EXT (quad) 3+/5 p! 4-/5   No quad lag present with SLR. Able to move actively through LAQ ROM from 0-90 pain free at EOB.    Knee Flex (HS) 4-/5 p! 3/5   Ankle DF       Ankle PF       Ankle Inv       Ankle EV               Circumference  Mid apex  7 cm prox        44.0 cm  50.0 cm     45.5 cm  50.5 cm     Gait:  Patient using wheelchair currently for all mobility due to injuring L LE in the same way as the R during a pivot turn. Patient having more pain and discomfort on L LE, so unable to use rolling walker to maintain NWBing status on R LE like originally planned. RESTRICTIONS/PRECAUTIONS: High BP. Previous tobacco use (5 per day). R knee scope, meniscus repair on 9/17/21. NWBing on R LE for 6 weeks. Exercises/Interventions:     Therapeutic Exercise Resistance Sets/sec Reps Notes   Recumbent bike rocks   5 min Able to get full revolutions by the end   Gastroc stretch on SB  30s 5    Hamstring stretch Long sitting 30s 5    Ankle pumps Long sitting, supine, or at EOB 1 30 HEP   Heelslides w/strap Supine 10s 10    Quad sets  10s 10    SAQ over bolster 3# ankle weight 5s 3x10    LAQ @EOB 0-45; 1# ankle weight 2 10 ROM restricted with step behind R LE   SLR flexion 2# 2 10    SLR abduction 2# 2 10    SLR adduction 2# 2 10                                  Therapeutic Activities                                                    Manual Intervention       R patella mobs - inferior, medial/lateral    Gr. I-II   R knee PROM - EOB, supine                                   NMR Re-education       Biodex - limits of stability - medium [place] lvl 8  4x    CC TKE Resistance 3 5s 20    Mod. STEPHANIE nelson isos   nv                                  Access Code: [email or web address]  URL: "[email or web address]  Date: 10/06/2021  Prepared by: Lexii Ríos    Exercises  Sidelying Hip Abduction - 1-2 x daily - 7 x weekly - 2-3 sets - 10 reps  Sidelying Hip Adduction - 1-2 x daily - 7 x weekly - 2-3 sets - 10 reps  *In addition to pre-op exercises including hamstring stretch, gastroc stretch, heelslides, quad sets, SLR flexion.       Therapeutic Exercise and NMR EXR  [x] (72094) Provided verbal/tactile cueing for activities related to strengthening, flexibility, endurance, ROM for improvements in LE, proximal hip, and core control with self care, mobility, lifting, ambulation. [x] (25720) Provided verbal/tactile cueing for activities related to improving balance, coordination, kinesthetic sense, posture, motor skill, proprioception  to assist with LE, proximal hip, and core control in self care, mobility, lifting, ambulation and eccentric single leg control. NMR and Therapeutic Activities:    [x] (62584 or 92861) Provided verbal/tactile cueing for activities related to improving balance, coordination, kinesthetic sense, posture, motor skill, proprioception and motor activation to allow for proper function of core, proximal hip and LE with self care and ADLs  [x] (18625) Gait Re-education- Provided training and instruction to the patient for proper LE, core and proximal hip recruitment and positioning and eccentric body weight control with ambulation re-education including up and down stairs     Home Exercise Program:    [x] (67269) Reviewed/Progressed HEP activities related to strengthening, flexibility, endurance, ROM of core, proximal hip and LE for functional self-care, mobility, lifting and ambulation/stair navigation   [x] (06542)Reviewed/Progressed HEP activities related to improving balance, coordination, kinesthetic sense, posture, motor skill, proprioception of core, proximal hip and LE for self care, mobility, lifting, and ambulation/stair navigation      Manual Treatments:  PROM / STM / Oscillations-Mobs:  G-I, II, III, IV (PA's, Inf., Post.)  [x] (50908) Provided manual therapy to mobilize LE, proximal hip and/or LS spine soft tissue/joints for the purpose of modulating pain, promoting relaxation,  increasing ROM, reducing/eliminating soft tissue swelling/inflammation/restriction, improving soft tissue extensibility and allowing for proper ROM for normal function with self care, mobility, lifting and ambulation.      Modalities:  Game Ready to R knee for pain/inflammation/edema - 10 minutes. 11/3    Charges:  Timed Code Treatment Minutes: 45   Total Treatment Minutes: 55       [] EVAL (LOW) 88711 (typically 20 minutes face-to-face)   [] EVAL (MOD) 69054 (typically 30 minutes face-to-face)  [] EVAL (HIGH) 71877 (typically 45 minutes face-to-face)  [] RE-EVAL     [x] KN(87191) x 2    [] IONTO (12389)  [x] NMR (17575) x 1   [x] VASO (39213)  [] Manual (90807) x     [] Other:  [] TA (54554)x    [] Mech Traction (26246)  [] ES(attended) (28456)     [] ES (un) (45885):     GOALS:  Patient stated goal: Be able to do normal activities. [x] Progressing: [] Met: [] Not Met: [] Adjusted    Therapist goals for Patient:   Short Term Goals: To be achieved in: 2 weeks  1. Independent in HEP and progression per patient tolerance, in order to prevent re-injury. [] Progressing: [x] Met: [] Not Met: [] Adjusted  2. Patient will have a decrease in pain to facilitate improvement in movement, function, and ADLs as indicated by Functional Deficits. [x] Progressing: [] Met: [] Not Met: [] Adjusted     Long Term Goals: To be achieved in: 8 weeks  1. Disability index score of 20% or less for the LEFS to assist with reaching prior level of function. [x] Progressing: [] Met: [] Not Met: [] Adjusted Comment:  80% disability on LEFS as of 10/20/21  2. Patient will demonstrate increased AROM to 0-120 to allow for proper joint functioning as indicated by patients Functional Deficits. [x] Progressing: [] Met: [] Not Met: [] Adjusted  Comment:  0-105 at 5 weeks post-op. ROM still restricted per protocol. 3. Patient will demonstrate an increase in strength to good proximal hip strength and control, within 5lb HHD in LE to allow for proper functional mobility as indicated by patients Functional Deficits. [x] Progressing: [] Met: [] Not Met: [] Adjusted Comment:  No quad lag with SLR. Able to perform LAQ at EOB 0-90 actively pain free.    4. Patient will demonstrate normal gait mechanics limited by fatique  [] Patient limited by pain  [] Patient limited by other medical complications  [] Other:     Overall Progression Towards Functional goals/ Treatment Progress Update:  [x] Patient is progressing as expected towards functional goals listed. [] Progression is slowed due to complexities/Impairments listed. [] Progression has been slowed due to co-morbidities. [] Plan just implemented, too soon to assess goals progression <30days   [] Goals require adjustment due to lack of progress  [] Patient is not progressing as expected and requires additional follow up with physician  [] Other    Prognosis for POC: [x] Good [] Fair  [] Poor    Patient requires continued skilled intervention: [x] Yes  [] No        PLAN: Progress weight bearing as sandie. [x] Continue per plan of care [] Alter current plan (see comments)  [] Plan of care initiated [] Hold pending MD visit [] Discharge    Electronically signed by: Shalom Huerta, PT, DPT, MS, SCS    Note: If patient does not return for scheduled/recommended follow up visits, this note will serve as a discharge from care along with the most recent update on progress.

## 2021-11-08 ENCOUNTER — HOSPITAL ENCOUNTER (OUTPATIENT)
Dept: PHYSICAL THERAPY | Age: 61
Setting detail: THERAPIES SERIES
Discharge: HOME | End: 2021-11-08
Payer: COMMERCIAL

## 2021-11-08 PROCEDURE — 97110 THERAPEUTIC EXERCISES: CPT

## 2021-11-08 PROCEDURE — 97530 THERAPEUTIC ACTIVITIES: CPT

## 2021-11-08 PROCEDURE — 97112 NEUROMUSCULAR REEDUCATION: CPT

## 2021-11-08 NOTE — FLOWSHEET NOTE
[address] [facility]    Physical Therapy Treatment Note/ Progress Report:     Date:  2021    Patient Name:  Yosef York    :  1960  MRN: [id number]  Medical/Treatment Diagnosis Information:  · Diagnosis: M23.306 Other meniscus derangements, unspecified meniscus, R knee; M25.561 R knee pain  · Treatment Diagnosis: M23.306 Other meniscus derangements, unspecified meniscus, R knee; M25.561 R knee pain  Insurance/Certification information:  PT Insurance Information: [facility]  Physician Information:  Referring Practitioner: Claude Richters  Plan of care signed (Y/N):     Date of Patient follow up with Physician:      Progress Report: []  Yes  [x]  No     Functional Scale: 80% disability - LEFS ()   Date: 10/20/21    Date Range for reporting period:  Beginnin21  Endin days or 10 visits    Progress report due (10 Rx/or 30 days whichever is less):      Recertification due (POC duration/ or 90 days whichever is less): 21     Visit # Insurance Allowable Auth Needed   [address] []Yes    [x]No     Pain level:  0/10 at start of session; 1-2/10 at worst.      SUBJECTIVE:    Really wants to be able to get in/out of the house by going up/down at least 3 steps by this weekend because she has a social get together to go to that requires she enter via some stairs. OBJECTIVE: 10/20/21   Observation:    Test measurements:    HIP Abd      HIP Ext       HIP IR       HIP ER       Knee ext 0 0   Knee Flex 117/120 p! 105   Ankle PF       Ankle DF       Ankle In       Ankle Ev       Strength  LEFT RIGHT   HIP Flexors  4-/5 4-/5    HIP Abductors 4-/5 4-/5   HIP Ext       Hip ER       Knee EXT (quad) 3+/5 p! 4-/5   No quad lag present with SLR. Able to move actively through LAQ ROM from 0-90 pain free at EOB.    Knee Flex (HS) 4-/5 p! 3/5   Ankle DF       Ankle PF       Ankle Inv       Ankle EV               Circumference  Mid apex  7 cm prox 44.0 cm  50.0 cm     45.5 cm  50.5 cm     Gait:  Patient using wheelchair currently for all mobility due to injuring L LE in the same way as the R during a pivot turn. Patient having more pain and discomfort on L LE, so unable to use rolling walker to maintain NWBing status on R LE like originally planned. RESTRICTIONS/PRECAUTIONS: High BP. Previous tobacco use (5 per day). R knee scope, meniscus repair on 9/17/21. NWBing on R LE for 6 weeks. Exercises/Interventions:     Therapeutic Exercise Resistance Sets/sec Reps Notes   Recumbent bike rocks   5 min Able to get full revolutions by the end   Gastroc stretch on SB  30s 5    Hamstring stretch Long sitting 30s 5    Ankle pumps HEP   Heelslides w/strap Supine 10s 10    Quad sets    SAQ over bolster    LAQ @EOB 0-90; 2# ankle weight 2 10 ROM restricted with step behind R LE   SLR flexion 2# 2 10    SLR abduction 2# 2 10    SLR adduction 2# 2 10                                  Therapeutic Activities       Gait w/SPC  2 45 feet Cues to coordinate cane in L hand and R LE                                             Manual Intervention       R patella mobs - inferior, medial/lateral    Gr. I-II   R knee PROM - EOB, supine                                   NMR Re-education       Biodex - limits of stability - large [place] lvl 8  3x    CC TKE Resistance 3 5s 20    Mod. BOSU lunge isos  10s 5                                  Access Code: [email or web address]  URL: [email or web address]  Date: 10/06/2021  Prepared by: Nazario Plan    Exercises  Sidelying Hip Abduction - 1-2 x daily - 7 x weekly - 2-3 sets - 10 reps  Sidelying Hip Adduction - 1-2 x daily - 7 x weekly - 2-3 sets - 10 reps  *In addition to pre-op exercises including hamstring stretch, gastroc stretch, heelslides, quad sets, SLR flexion.       Therapeutic Exercise and NMR EXR  [x] (70702) Provided verbal/tactile cueing for activities related to strengthening, flexibility, endurance, ROM for improvements in LE, proximal hip, and core control with self care, mobility, lifting, ambulation. [x] (65816) Provided verbal/tactile cueing for activities related to improving balance, coordination, kinesthetic sense, posture, motor skill, proprioception  to assist with LE, proximal hip, and core control in self care, mobility, lifting, ambulation and eccentric single leg control. NMR and Therapeutic Activities:    [x] (51760 or 77027) Provided verbal/tactile cueing for activities related to improving balance, coordination, kinesthetic sense, posture, motor skill, proprioception and motor activation to allow for proper function of core, proximal hip and LE with self care and ADLs  [x] (38910) Gait Re-education- Provided training and instruction to the patient for proper LE, core and proximal hip recruitment and positioning and eccentric body weight control with ambulation re-education including up and down stairs     Home Exercise Program:    [x] (12252) Reviewed/Progressed HEP activities related to strengthening, flexibility, endurance, ROM of core, proximal hip and LE for functional self-care, mobility, lifting and ambulation/stair navigation   [x] (37024)Reviewed/Progressed HEP activities related to improving balance, coordination, kinesthetic sense, posture, motor skill, proprioception of core, proximal hip and LE for self care, mobility, lifting, and ambulation/stair navigation      Manual Treatments:  PROM / STM / Oscillations-Mobs:  G-I, II, III, IV (PA's, Inf., Post.)  [x] (21926) Provided manual therapy to mobilize LE, proximal hip and/or LS spine soft tissue/joints for the purpose of modulating pain, promoting relaxation,  increasing ROM, reducing/eliminating soft tissue swelling/inflammation/restriction, improving soft tissue extensibility and allowing for proper ROM for normal function with self care, mobility, lifting and ambulation. Modalities:  Game Ready to R knee for pain/inflammation/edema - 10 minutes. 11/8    Charges:  Timed Code Treatment Minutes: 45   Total Treatment Minutes: 55       [] EVAL (LOW) 48962 (typically 20 minutes face-to-face)   [] EVAL (MOD) 57854 (typically 30 minutes face-to-face)  [] EVAL (HIGH) 87612 (typically 45 minutes face-to-face)  [] RE-EVAL     [x] ZF(18357) x 1    [] IONTO (45294)  [x] NMR (94470) x 1   [] VASO (81894)  [] Manual (61637) x     [] Other:  [x] TA (44496)x 1   [] Mech Traction (83764)  [] ES(attended) (97161)     [] ES (un) (79252):     GOALS:  Patient stated goal: Be able to do normal activities. [x] Progressing: [] Met: [] Not Met: [] Adjusted    Therapist goals for Patient:   Short Term Goals: To be achieved in: 2 weeks  1. Independent in HEP and progression per patient tolerance, in order to prevent re-injury. [] Progressing: [x] Met: [] Not Met: [] Adjusted  2. Patient will have a decrease in pain to facilitate improvement in movement, function, and ADLs as indicated by Functional Deficits. [x] Progressing: [] Met: [] Not Met: [] Adjusted     Long Term Goals: To be achieved in: 8 weeks  1. Disability index score of 20% or less for the LEFS to assist with reaching prior level of function. [x] Progressing: [] Met: [] Not Met: [] Adjusted Comment:  80% disability on LEFS as of 10/20/21  2. Patient will demonstrate increased AROM to 0-120 to allow for proper joint functioning as indicated by patients Functional Deficits. [x] Progressing: [] Met: [] Not Met: [] Adjusted  Comment:  0-105 at 5 weeks post-op. ROM still restricted per protocol. 3. Patient will demonstrate an increase in strength to good proximal hip strength and control, within 5lb HHD in LE to allow for proper functional mobility as indicated by patients Functional Deficits. [x] Progressing: [] Met: [] Not Met: [] Adjusted Comment:  No quad lag with SLR. Able to perform LAQ at EOB 0-90 actively pain free.    4. Patient will demonstrate normal gait mechanics without increased symptoms or restriction to allow him to walk and perform all daily mobility. [x] Progressing: [] Met: [] Not Met: [] Adjusted  Comment:  Still restricted to [address] on the R LE. Unable to tolerate much weight bearing on the L due to same injury on the L knee. 5. Patient will be able to navigate stairs up/down with reciprocal gait mechanics without increased symptoms or restriction to allow him normal household mobility without restricted access to certain parts of his home. [] Progressing: [] Met: [x] Not Met: [] Adjusted           Progression Towards Functional goals:  [x] Patient is progressing as expected towards functional goals listed. [] Progression is slowed due to complexities listed. [] Progression has been slowed due to co-morbidities. [] Plan just implemented, too soon to assess goals progression  [] Other:     ASSESSMENT:  Patient achieved 0-120 R knee ROM today. Patient demonstrating improved weight bearing tolerance on R LE compared to previous visit. Barely using rolling walker to ambulate at start of session and throughout session today, so progressed patient to ambulation with SPC. Practiced gait with SPC to improve coordination of SPC with R LE. Also unlocked R knee post-op brace to help improve normal R knee flexion swing mechanics during ambulation. Encouraged use of SPC for ambulation only for household distances within her home at this time. Patient to continue use of RW for community ambulation for improved safety with post-op brace unlocked to continue normalization of gait mechanics. Able to progress WBing quad activation/strength tasks to include modified BOSU lunge isometrics. Patient tolerated session very well. Will continue to progress weight bearing strength tasks as sandie to allow full return to PLOF without limitations.       Return to Play: (if applicable)   []  Stage 1: Intro to Strength   []  Stage 2: Return to Run and Strength   []  Stage 3: Return to Jump and Strength   []  Stage 4: Dynamic Strength and Agility   []  Stage 5: Sport Specific Training     []  Ready to Return to Play, Meets All Above Stages   []  Not Ready for Return to Sports   Comments:            Treatment/Activity Tolerance:  [x] Patient tolerated treatment well [] Patient limited by fatique  [] Patient limited by pain  [] Patient limited by other medical complications  [] Other:     Overall Progression Towards Functional goals/ Treatment Progress Update:  [x] Patient is progressing as expected towards functional goals listed. [] Progression is slowed due to complexities/Impairments listed. [] Progression has been slowed due to co-morbidities. [] Plan just implemented, too soon to assess goals progression <30days   [] Goals require adjustment due to lack of progress  [] Patient is not progressing as expected and requires additional follow up with physician  [] Other    Prognosis for POC: [x] Good [] Fair  [] Poor    Patient requires continued skilled intervention: [x] Yes  [] No        PLAN: Progress weight bearing as sandie. [x] Continue per plan of care [] Alter current plan (see comments)  [] Plan of care initiated [] Hold pending MD visit [] Discharge    Electronically signed by: Judith Rosas PT, DPT, MS, SCS    Note: If patient does not return for scheduled/recommended follow up visits, this note will serve as a discharge from care along with the most recent update on progress.

## 2021-11-10 ENCOUNTER — HOSPITAL ENCOUNTER (OUTPATIENT)
Dept: PHYSICAL THERAPY | Age: 61
Setting detail: THERAPIES SERIES
Discharge: HOME | End: 2021-11-10
Payer: COMMERCIAL

## 2021-11-10 PROCEDURE — 97112 NEUROMUSCULAR REEDUCATION: CPT

## 2021-11-10 PROCEDURE — 97530 THERAPEUTIC ACTIVITIES: CPT

## 2021-11-10 PROCEDURE — 97110 THERAPEUTIC EXERCISES: CPT

## 2021-11-10 NOTE — FLOWSHEET NOTE
4-/5   HIP Ext       Hip ER       Knee EXT (quad) 3+/5 p! 4-/5   No quad lag present with SLR. Able to move actively through LAQ ROM from 0-90 pain free at EOB. Knee Flex (HS) 4-/5 p! 3/5   Ankle DF       Ankle PF       Ankle Inv       Ankle EV               Circumference  Mid apex  7 cm prox        44.0 cm  50.0 cm     45.5 cm  50.5 cm     Gait:  Patient using wheelchair currently for all mobility due to injuring L LE in the same way as the R during a pivot turn. Patient having more pain and discomfort on L LE, so unable to use rolling walker to maintain NWBing status on R LE like originally planned. RESTRICTIONS/PRECAUTIONS: High BP. Previous tobacco use (5 per day). R knee scope, meniscus repair on 9/17/21. NWBing on R LE for 6 weeks. Exercises/Interventions:     Therapeutic Exercise Resistance Sets/sec Reps Notes   Recumbent bike   5 min    Gastroc stretch on SB  30s 5    Hamstring stretch Long sitting 30s 5    Ankle pumps HEP   Heelslides w/strap Supine 10s 10    Quad sets    SAQ over bolster    LAQ @EOB 0-90; 2# ankle weight 2 10 ROM restricted with step behind R LE   SLR flexion 2# 2 10    SLR abduction 2# 2 10    SLR adduction 2# 2 10                                  Therapeutic Activities       Step ups on 4\"  2 10 Cues to drive through heel, achieve TKE on R LE, slow control down during eccentric phase   Cone ambulation   nv                                Manual Intervention       R patella mobs - inferior, medial/lateral    Gr. I-II   R knee PROM - EOB, supine                                   NMR Re-education       Biodex - limits of stability - large [place] lvl 8  4x    CC TKE Resistance 3 5s 20    Mod. BOSU lunge isos  10s 5    Sport cord march (yellow) R stance only 1 10 Fwd/bwd                          Access Code: [email or web address]  URL: [email or web address]  Date: 10/06/2021  Prepared by: Robert Coleman    Exercises  Sidelying Hip Abduction - 1-2 x daily - 7 x weekly - 2-3 sets - 10 reps  Sidelying Hip Adduction - 1-2 x daily - 7 x weekly - 2-3 sets - 10 reps  *In addition to pre-op exercises including hamstring stretch, gastroc stretch, heelslides, quad sets, SLR flexion. Therapeutic Exercise and NMR EXR  [x] (81056) Provided verbal/tactile cueing for activities related to strengthening, flexibility, endurance, ROM for improvements in LE, proximal hip, and core control with self care, mobility, lifting, ambulation. [x] (54360) Provided verbal/tactile cueing for activities related to improving balance, coordination, kinesthetic sense, posture, motor skill, proprioception  to assist with LE, proximal hip, and core control in self care, mobility, lifting, ambulation and eccentric single leg control.      NMR and Therapeutic Activities:    [x] (03715 or 46522) Provided verbal/tactile cueing for activities related to improving balance, coordination, kinesthetic sense, posture, motor skill, proprioception and motor activation to allow for proper function of core, proximal hip and LE with self care and ADLs  [x] (77127) Gait Re-education- Provided training and instruction to the patient for proper LE, core and proximal hip recruitment and positioning and eccentric body weight control with ambulation re-education including up and down stairs     Home Exercise Program:    [x] (94676) Reviewed/Progressed HEP activities related to strengthening, flexibility, endurance, ROM of core, proximal hip and LE for functional self-care, mobility, lifting and ambulation/stair navigation   [x] (18720)Reviewed/Progressed HEP activities related to improving balance, coordination, kinesthetic sense, posture, motor skill, proprioception of core, proximal hip and LE for self care, mobility, lifting, and ambulation/stair navigation      Manual Treatments:  PROM / STM / Oscillations-Mobs:  G-I, II, III, IV (PA's, Inf., Post.)  [x] (13723) Provided manual therapy to mobilize LE, proximal hip and/or LS spine soft tissue/joints for the purpose of modulating pain, promoting relaxation,  increasing ROM, reducing/eliminating soft tissue swelling/inflammation/restriction, improving soft tissue extensibility and allowing for proper ROM for normal function with self care, mobility, lifting and ambulation. Modalities:   Deferred. 11/10    Charges:  Timed Code Treatment Minutes: 50   Total Treatment Minutes: 50       [] EVAL (LOW) 78080 (typically 20 minutes face-to-face)   [] EVAL (MOD) 94416 (typically 30 minutes face-to-face)  [] EVAL (HIGH) 96352 (typically 45 minutes face-to-face)  [] RE-EVAL     [x] WQ(71692) x 1    [] IONTO (62719)  [x] NMR (37298) x 1   [] VASO (11090)  [] Manual (67395) x     [] Other:  [x] TA (07441)x 1   [] Mech Traction (78973)  [] ES(attended) (45349)     [] ES (un) (17474):     GOALS:  Patient stated goal: Be able to do normal activities. [x] Progressing: [] Met: [] Not Met: [] Adjusted    Therapist goals for Patient:   Short Term Goals: To be achieved in: 2 weeks  1. Independent in HEP and progression per patient tolerance, in order to prevent re-injury. [] Progressing: [x] Met: [] Not Met: [] Adjusted  2. Patient will have a decrease in pain to facilitate improvement in movement, function, and ADLs as indicated by Functional Deficits. [x] Progressing: [] Met: [] Not Met: [] Adjusted     Long Term Goals: To be achieved in: 8 weeks  1. Disability index score of 20% or less for the LEFS to assist with reaching prior level of function. [x] Progressing: [] Met: [] Not Met: [] Adjusted Comment:  80% disability on LEFS as of 10/20/21  2. Patient will demonstrate increased AROM to 0-120 to allow for proper joint functioning as indicated by patients Functional Deficits. [x] Progressing: [] Met: [] Not Met: [] Adjusted  Comment:  0-105 at 5 weeks post-op. ROM still restricted per protocol.   3. Patient will demonstrate an increase in strength to good proximal hip strength and control, within 5lb HHD in LE to allow for proper functional mobility as indicated by patients Functional Deficits. [x] Progressing: [] Met: [] Not Met: [] Adjusted Comment:  No quad lag with SLR. Able to perform LAQ at EOB 0-90 actively pain free. 4. Patient will demonstrate normal gait mechanics without increased symptoms or restriction to allow him to walk and perform all daily mobility. [x] Progressing: [] Met: [] Not Met: [] Adjusted  Comment:  Still restricted to [address] on the R LE. Unable to tolerate much weight bearing on the L due to same injury on the L knee. 5. Patient will be able to navigate stairs up/down with reciprocal gait mechanics without increased symptoms or restriction to allow him normal household mobility without restricted access to certain parts of his home. [] Progressing: [] Met: [x] Not Met: [] Adjusted           Progression Towards Functional goals:  [x] Patient is progressing as expected towards functional goals listed. [] Progression is slowed due to complexities listed. [] Progression has been slowed due to co-morbidities. [] Plan just implemented, too soon to assess goals progression  [] Other:     ASSESSMENT:  Patient ambulated into session using SPC on the L as instructed previous session. Still some restrictions noted in R knee flexion during swing phase on the R. Will likely work on gait mechanics with cone ambulation at [employer]. Patient continuing use of RW for longer distance community ambulation with post-op brace unlocked for improved safety to continue normalization of gait mechanics. Continued progression of WBing quad activation/strength tasks to include sport cord marching and step ups on 4\" step with cueing to achieve TKE on R LE during concentric phase. Patient tolerated session very well. Will continue to progress weight bearing strength tasks as sandie to allow full return to PLOF without limitations.       Return to Play: (if applicable)   []  Stage 1: Intro to Strength   []  Stage 2: Return to Run and Strength   []  Stage 3: Return to Jump and Strength   []  Stage 4: Dynamic Strength and Agility   []  Stage 5: Sport Specific Training     []  Ready to Return to Play, Meets All Above Stages   []  Not Ready for Return to Sports   Comments:            Treatment/Activity Tolerance:  [x] Patient tolerated treatment well [] Patient limited by fatique  [] Patient limited by pain  [] Patient limited by other medical complications  [] Other:     Overall Progression Towards Functional goals/ Treatment Progress Update:  [x] Patient is progressing as expected towards functional goals listed. [] Progression is slowed due to complexities/Impairments listed. [] Progression has been slowed due to co-morbidities. [] Plan just implemented, too soon to assess goals progression <30days   [] Goals require adjustment due to lack of progress  [] Patient is not progressing as expected and requires additional follow up with physician  [] Other    Prognosis for POC: [x] Good [] Fair  [] Poor    Patient requires continued skilled intervention: [x] Yes  [] No        PLAN: Progress weight bearing as sandie. [x] Continue per plan of care [] Alter current plan (see comments)  [] Plan of care initiated [] Hold pending MD visit [] Discharge    Electronically signed by: Masood Clemons, PT, DPT, MS, SCS    Note: If patient does not return for scheduled/recommended follow up visits, this note will serve as a discharge from care along with the most recent update on progress.

## 2021-11-15 ENCOUNTER — HOSPITAL ENCOUNTER (OUTPATIENT)
Dept: PHYSICAL THERAPY | Age: 61
Setting detail: THERAPIES SERIES
Discharge: HOME | End: 2021-11-15
Payer: COMMERCIAL

## 2021-11-15 PROCEDURE — 97530 THERAPEUTIC ACTIVITIES: CPT

## 2021-11-15 PROCEDURE — 97110 THERAPEUTIC EXERCISES: CPT

## 2021-11-15 PROCEDURE — 97112 NEUROMUSCULAR REEDUCATION: CPT

## 2021-11-15 NOTE — FLOWSHEET NOTE
[facility]    Physical Therapy Treatment Note/ Progress Report:     Date:  11/15/2021    Patient Name:  Gisela Medina    :  1960  MRN: [id number]  Medical/Treatment Diagnosis Information:  · Diagnosis: M23.306 Other meniscus derangements, unspecified meniscus, R knee; M25.561 R knee pain  · Treatment Diagnosis: M23.306 Other meniscus derangements, unspecified meniscus, R knee; M25.561 R knee pain  Insurance/Certification information:  PT Insurance Information: [facility]  Physician Information:  Referring Practitioner: Gwendolyn Taylor  Plan of care signed (Y/N):     Date of Patient follow up with Physician:      Progress Report: []  Yes  [x]  No     Functional Scale: 80% disability - LEFS ()   Date: 10/20/21    Date Range for reporting period:  Beginnin21  Endin days or 10 visits    Progress report due (10 Rx/or 30 days whichever is less):      Recertification due (POC duration/ or 90 days whichever is less): 21     Visit # Insurance Allowable Auth Needed   Gail [id number] []Yes    [x]No     Pain level:  0/10 at start of session; 1-2/10 at worst.      SUBJECTIVE:    Patient had to go up three steps to get into a friend's home for a social gathering this weekend. Patient did well with it. Had no issues with getting into her friend's home. R knee was moderately sore in the joint for about 2 days following advancements last session. OBJECTIVE: 10/20/21   Observation:    Test measurements:    HIP Abd      HIP Ext       HIP IR       HIP ER       Knee ext 0 0   Knee Flex 117/120 p! 105   Ankle PF       Ankle DF       Ankle In       Ankle Ev       Strength  LEFT RIGHT   HIP Flexors  4-/5 4-/5    HIP Abductors 4-/5 4-/5   HIP Ext       Hip ER       Knee EXT (quad) 3+/5 p! 4-/5   No quad lag present with SLR. Able to move actively through LAQ ROM from 0-90 pain free at EOB.    Knee Flex (HS) 4-/5 p! 3/5   Ankle DF       Ankle PF Ankle Inv       Ankle EV               Circumference  Mid apex  7 cm prox        44.0 cm  50.0 cm     45.5 cm  50.5 cm     Gait:  Patient using wheelchair currently for all mobility due to injuring L LE in the same way as the R during a pivot turn. Patient having more pain and discomfort on L LE, so unable to use rolling walker to maintain NWBing status on R LE like originally planned. RESTRICTIONS/PRECAUTIONS: High BP. Previous tobacco use (5 per day). R knee scope, meniscus repair on 9/17/21. NWBing on R LE for 6 weeks. Exercises/Interventions:     Therapeutic Exercise Resistance Sets/sec Reps Notes   Recumbent bike   5 min    Gastroc stretch on SB  30s 5    Hamstring stretch Long sitting 30s 5    Ankle pumps HEP   Heelslides w/strap Supine 10s 10    Quad sets    SAQ over bolster    LAQ @EOB 0-90; 2# ankle weight 2 10 ROM restricted with step behind R LE   SLR flexion 2# 2 10    SLR abduction 2# 2 10    SLR adduction 2# 2 10                                  Therapeutic Activities       Step ups on 4\"  2 10 Cues to drive through heel, achieve TKE on R LE, slow control down during eccentric phase   Cone ambulation w/SPC for balance   3x Cues for increased R knee flexion during swing phase, heel initial contact                               Manual Intervention       R patella mobs - inferior, medial/lateral    Gr. I-II   R knee PROM - EOB, supine                                   NMR Re-education       Biodex - limits of stability - large [place] lvl 7  4x    CC TKE Resistance 3 + extra weight 5s 20    Mod. BOSU lunge isos  10s 5    Sport cord march (yellow) R stance only 2 10 Each position; Fwd/bwd                          Access Code: [email or web address]  URL: [email or web address]  Date: 10/06/2021  Prepared by: Sharia Cranker    Exercises  Sidelying Hip Abduction - 1-2 x daily - 7 x weekly - 2-3 sets - 10 reps  Sidelying Hip Adduction - 1-2 x daily - 7 x weekly - 2-3 sets - 10 reps  *In addition to pre-op exercises including hamstring stretch, gastroc stretch, heelslides, quad sets, SLR flexion. Therapeutic Exercise and NMR EXR  [x] (35993) Provided verbal/tactile cueing for activities related to strengthening, flexibility, endurance, ROM for improvements in LE, proximal hip, and core control with self care, mobility, lifting, ambulation. [x] (01761) Provided verbal/tactile cueing for activities related to improving balance, coordination, kinesthetic sense, posture, motor skill, proprioception  to assist with LE, proximal hip, and core control in self care, mobility, lifting, ambulation and eccentric single leg control.      NMR and Therapeutic Activities:    [x] (82503 or 78465) Provided verbal/tactile cueing for activities related to improving balance, coordination, kinesthetic sense, posture, motor skill, proprioception and motor activation to allow for proper function of core, proximal hip and LE with self care and ADLs  [x] (60735) Gait Re-education- Provided training and instruction to the patient for proper LE, core and proximal hip recruitment and positioning and eccentric body weight control with ambulation re-education including up and down stairs     Home Exercise Program:    [x] (07953) Reviewed/Progressed HEP activities related to strengthening, flexibility, endurance, ROM of core, proximal hip and LE for functional self-care, mobility, lifting and ambulation/stair navigation   [x] (10097)Reviewed/Progressed HEP activities related to improving balance, coordination, kinesthetic sense, posture, motor skill, proprioception of core, proximal hip and LE for self care, mobility, lifting, and ambulation/stair navigation      Manual Treatments:  PROM / STM / Oscillations-Mobs:  G-I, II, III, IV (PA's, Inf., Post.)  [x] (66892) Provided manual therapy to mobilize LE, proximal hip and/or LS spine soft tissue/joints for the purpose of modulating pain, promoting relaxation,  increasing ROM, reducing/eliminating soft tissue swelling/inflammation/restriction, improving soft tissue extensibility and allowing for proper ROM for normal function with self care, mobility, lifting and ambulation. Modalities:   Deferred. 11/15    Charges:  Timed Code Treatment Minutes: 50   Total Treatment Minutes: 50       [] EVAL (LOW) 83063 (typically 20 minutes face-to-face)   [] EVAL (MOD) 25755 (typically 30 minutes face-to-face)  [] EVAL (HIGH) 28399 (typically 45 minutes face-to-face)  [] RE-EVAL     [x] ER(54117) x 1    [] IONTO (79330)  [x] NMR (64942) x 1   [] VASO (43650)  [] Manual (03299) x     [] Other:  [x] TA (53668)x 1   [] Mech Traction (65592)  [] ES(attended) (71168)     [] ES (un) (76543):     GOALS:  Patient stated goal: Be able to do normal activities. [x] Progressing: [] Met: [] Not Met: [] Adjusted    Therapist goals for Patient:   Short Term Goals: To be achieved in: 2 weeks  1. Independent in HEP and progression per patient tolerance, in order to prevent re-injury. [] Progressing: [x] Met: [] Not Met: [] Adjusted  2. Patient will have a decrease in pain to facilitate improvement in movement, function, and ADLs as indicated by Functional Deficits. [x] Progressing: [] Met: [] Not Met: [] Adjusted     Long Term Goals: To be achieved in: 8 weeks  1. Disability index score of 20% or less for the LEFS to assist with reaching prior level of function. [x] Progressing: [] Met: [] Not Met: [] Adjusted Comment:  80% disability on LEFS as of 10/20/21  2. Patient will demonstrate increased AROM to 0-120 to allow for proper joint functioning as indicated by patients Functional Deficits. [x] Progressing: [] Met: [] Not Met: [] Adjusted  Comment:  0-105 at 5 weeks post-op. ROM still restricted per protocol.   3. Patient will demonstrate an increase in strength to good proximal hip strength and control, within 5lb HHD in LE to allow for proper functional mobility as indicated by patients Functional Deficits. [x] Progressing: [] Met: [] Not Met: [] Adjusted Comment:  No quad lag with SLR. Able to perform LAQ at EOB 0-90 actively pain free. 4. Patient will demonstrate normal gait mechanics without increased symptoms or restriction to allow him to walk and perform all daily mobility. [x] Progressing: [] Met: [] Not Met: [] Adjusted  Comment:  Still restricted to [address] on the R LE. Unable to tolerate much weight bearing on the L due to same injury on the L knee. 5. Patient will be able to navigate stairs up/down with reciprocal gait mechanics without increased symptoms or restriction to allow him normal household mobility without restricted access to certain parts of his home. [] Progressing: [] Met: [x] Not Met: [] Adjusted           Progression Towards Functional goals:  [x] Patient is progressing as expected towards functional goals listed. [] Progression is slowed due to complexities listed. [] Progression has been slowed due to co-morbidities. [] Plan just implemented, too soon to assess goals progression  [] Other:     ASSESSMENT: Still some restrictions noted in R knee flexion during swing phase on the R. [employer] cone ambulation into today's session to cue for improved R knee flexion during swing phase of gait and for improved heel to toe mechanics at initial contact on the R. Minimal change in POC from last session due to complaints of moderate R knee joint soreness/achiness for at least 48 hours following. Will continue to progress weight bearing strength tasks as sandie to allow full return to PLOF without limitations.       Return to Play: (if applicable)   []  Stage 1: Intro to Strength   []  Stage 2: Return to Run and Strength   []  Stage 3: Return to Jump and Strength   []  Stage 4: Dynamic Strength and Agility   []  Stage 5: Sport Specific Training     []  Ready to Return to Play, Meets All Above Stages   []  Not Ready for Return to Sports   Comments:            Treatment/Activity Tolerance:  [x] Patient tolerated treatment well [] Patient limited by fatique  [] Patient limited by pain  [] Patient limited by other medical complications  [] Other:     Overall Progression Towards Functional goals/ Treatment Progress Update:  [x] Patient is progressing as expected towards functional goals listed. [] Progression is slowed due to complexities/Impairments listed. [] Progression has been slowed due to co-morbidities. [] Plan just implemented, too soon to assess goals progression <30days   [] Goals require adjustment due to lack of progress  [] Patient is not progressing as expected and requires additional follow up with physician  [] Other    Prognosis for POC: [x] Good [] Fair  [] Poor    Patient requires continued skilled intervention: [x] Yes  [] No        PLAN: Progress weight bearing as sandie. [x] Continue per plan of care [] Alter current plan (see comments)  [] Plan of care initiated [] Hold pending MD visit [] Discharge    Electronically signed by: Juan A El, PT, DPT, MS, SCS    Note: If patient does not return for scheduled/recommended follow up visits, this note will serve as a discharge from care along with the most recent update on progress.

## 2021-11-17 ENCOUNTER — HOSPITAL ENCOUNTER (OUTPATIENT)
Dept: PHYSICAL THERAPY | Age: 61
Setting detail: THERAPIES SERIES
Discharge: HOME | End: 2021-11-17
Payer: COMMERCIAL

## 2021-11-17 ENCOUNTER — TELEPHONE (OUTPATIENT)
Dept: ORTHOPEDIC SURGERY | Age: 61
End: 2021-11-17

## 2021-11-17 PROCEDURE — 97112 NEUROMUSCULAR REEDUCATION: CPT

## 2021-11-17 PROCEDURE — 97530 THERAPEUTIC ACTIVITIES: CPT

## 2021-11-17 PROCEDURE — 97110 THERAPEUTIC EXERCISES: CPT

## 2021-11-17 NOTE — FLOWSHEET NOTE
[facility]    Physical Therapy Treatment Note/ Progress Report:     Date:  2021    Patient Name:  Rozina Davis    :  1960  MRN: [id number]  Medical/Treatment Diagnosis Information:  · Diagnosis: M23.306 Other meniscus derangements, unspecified meniscus, R knee; M25.561 R knee pain  · Treatment Diagnosis: M23.306 Other meniscus derangements, unspecified meniscus, R knee; M25.561 R knee pain  Insurance/Certification information:  PT Insurance Information: [facility]  Physician Information:  Referring Practitioner: Emma Sequeira  Plan of care signed (Y/N):     Date of Patient follow up with Physician:      Progress Report: []  Yes  [x]  No     Functional Scale: 80% disability - LEFS ()   Date: 10/20/21    Date Range for reporting period:  Beginnin21  Endin days or 10 visits    Progress report due (10 Rx/or 30 days whichever is less): 21 Progress note nv. Recertification due (POC duration/ or 90 days whichever is less): 21     Visit # Insurance Allowable Auth Needed   [address] [place] [address] []Yes    [x]No     Pain level:  0/10 at start of session; 1-2/10 at worst.      SUBJECTIVE:    Denies increased R knee joint achiness or soreness after last session even though she and her  had to immediately drive to [place] afterwards for a celebration of life. This required a lot of prolonged sitting, standing, and walking. Patient finally sleeping better in bed because she has not been wearing the R knee post-op brace at night. Really only wearing the post-op brace for walking outside of the house for safety. Sees Dr. Tala Lopez for follow up Wednesday next week.       OBJECTIVE: 10/20/21   Observation:    Test measurements:    HIP Abd      HIP Ext       HIP IR       HIP ER       Knee ext 0 0   Knee Flex 117/120 p! 105   Ankle PF       Ankle DF       Ankle In       Ankle Ev       Strength  LEFT RIGHT   HIP Flexors  4-/5 4-/5 HIP Abductors 4-/5 4-/5   HIP Ext       Hip ER       Knee EXT (quad) 3+/5 p! 4-/5   No quad lag present with SLR. Able to move actively through LAQ ROM from 0-90 pain free at EOB. Knee Flex (HS) 4-/5 p! 3/5   Ankle DF       Ankle PF       Ankle Inv       Ankle EV               Circumference  Mid apex  7 cm prox        44.0 cm  50.0 cm     45.5 cm  50.5 cm     Gait:  Patient using wheelchair currently for all mobility due to injuring L LE in the same way as the R during a pivot turn. Patient having more pain and discomfort on L LE, so unable to use rolling walker to maintain NWBing status on R LE like originally planned. RESTRICTIONS/PRECAUTIONS: High BP. Previous tobacco use (5 per day). R knee scope, meniscus repair on 9/17/21. NWBing on R LE for 6 weeks. Exercises/Interventions:     Therapeutic Exercise Resistance Sets/sec Reps Notes   Recumbent bike   5 min    Gastroc stretch on SB  30s 5    Hamstring stretch Long sitting 30s 5    Ankle pumps HEP   Heelslides w/strap Supine 10s 10    Quad sets    SAQ over bolster    LAQ @EOB 0-90; 2# ankle weight 2 10 ROM restricted with step behind R LE   SLR flexion 2# 2 10    SLR abduction 2# 2 10    SLR adduction 2# 2 10    Leg press - 2 up SL down 50# 2 10                           Therapeutic Activities       Step ups on 6\"  2 10 Cues to drive through heel, achieve TKE on R LE, slow control down during eccentric phase   Cone ambulation w/SPC for balance   3x Cues for increased R knee flexion during swing phase, heel initial contact                               Manual Intervention       R patella mobs - inferior, medial/lateral    Gr. I-II   R knee PROM - EOB, supine                                   NMR Re-education       [employer] - maze tracing - easy, medium, and difficult skill levels lvl 8  4x    CC TKE Resistance 4 5s 20    Mod. BOSU lunge isos  10s 5    Sport cord march (yellow) R stance only 2 10 Each position;  Fwd/bwd   SLS balance (modified - L LE support as needed)  20s 3                    Access Code: [email or web address]  URL: [email or web address]  Date: 10/06/2021  Prepared by: Oneida Horta    Exercises  Sidelying Hip Abduction - 1-2 x daily - 7 x weekly - 2-3 sets - 10 reps  Sidelying Hip Adduction - 1-2 x daily - 7 x weekly - 2-3 sets - 10 reps  *In addition to pre-op exercises including hamstring stretch, gastroc stretch, heelslides, quad sets, SLR flexion. Therapeutic Exercise and NMR EXR  [x] (33110) Provided verbal/tactile cueing for activities related to strengthening, flexibility, endurance, ROM for improvements in LE, proximal hip, and core control with self care, mobility, lifting, ambulation. [x] (02840) Provided verbal/tactile cueing for activities related to improving balance, coordination, kinesthetic sense, posture, motor skill, proprioception  to assist with LE, proximal hip, and core control in self care, mobility, lifting, ambulation and eccentric single leg control.      NMR and Therapeutic Activities:    [x] (27862 or 29143) Provided verbal/tactile cueing for activities related to improving balance, coordination, kinesthetic sense, posture, motor skill, proprioception and motor activation to allow for proper function of core, proximal hip and LE with self care and ADLs  [x] (88128) Gait Re-education- Provided training and instruction to the patient for proper LE, core and proximal hip recruitment and positioning and eccentric body weight control with ambulation re-education including up and down stairs     Home Exercise Program:    [x] (35863) Reviewed/Progressed HEP activities related to strengthening, flexibility, endurance, ROM of core, proximal hip and LE for functional self-care, mobility, lifting and ambulation/stair navigation   [x] (31861)Reviewed/Progressed HEP activities related to improving balance, coordination, kinesthetic sense, posture, motor skill, proprioception of core, proximal hip and LE for self care, mobility, lifting, and ambulation/stair navigation      Manual Treatments:  PROM / STM / Oscillations-Mobs:  G-I, II, III, IV (PA's, Inf., Post.)  [x] (75901) Provided manual therapy to mobilize LE, proximal hip and/or LS spine soft tissue/joints for the purpose of modulating pain, promoting relaxation,  increasing ROM, reducing/eliminating soft tissue swelling/inflammation/restriction, improving soft tissue extensibility and allowing for proper ROM for normal function with self care, mobility, lifting and ambulation. Modalities:   Deferred. 11/17    Charges:  Timed Code Treatment Minutes: 50   Total Treatment Minutes: 50       [] EVAL (LOW) 17388 (typically 20 minutes face-to-face)   [] EVAL (MOD) 47945 (typically 30 minutes face-to-face)  [] EVAL (HIGH) 81200 (typically 45 minutes face-to-face)  [] RE-EVAL     [x] MARILYN(52676) x 1    [] IONTO (39905)  [x] NMR (34054) x 1   [] VASO (97775)  [] Manual (26067) x     [] Other:  [x] TA (78084)x 1   [] Mech Traction (80625)  [] ES(attended) (47763)     [] ES (un) (82256):     GOALS:  Patient stated goal: Be able to do normal activities. [x] Progressing: [] Met: [] Not Met: [] Adjusted    Therapist goals for Patient:   Short Term Goals: To be achieved in: 2 weeks  1. Independent in HEP and progression per patient tolerance, in order to prevent re-injury. [] Progressing: [x] Met: [] Not Met: [] Adjusted  2. Patient will have a decrease in pain to facilitate improvement in movement, function, and ADLs as indicated by Functional Deficits. [x] Progressing: [] Met: [] Not Met: [] Adjusted     Long Term Goals: To be achieved in: 8 weeks  1. Disability index score of 20% or less for the LEFS to assist with reaching prior level of function. [x] Progressing: [] Met: [] Not Met: [] Adjusted Comment:  80% disability on LEFS as of 10/20/21  2. Patient will demonstrate increased AROM to 0-120 to allow for proper joint functioning as indicated by patients Functional Deficits.    [x] Progressing: [] Met: [] Not Met: [] Adjusted  Comment:  0-105 at 5 weeks post-op. ROM still restricted per protocol. 3. Patient will demonstrate an increase in strength to good proximal hip strength and control, within 5lb HHD in LE to allow for proper functional mobility as indicated by patients Functional Deficits. [x] Progressing: [] Met: [] Not Met: [] Adjusted Comment:  No quad lag with SLR. Able to perform LAQ at EOB 0-90 actively pain free. 4. Patient will demonstrate normal gait mechanics without increased symptoms or restriction to allow him to walk and perform all daily mobility. [x] Progressing: [] Met: [] Not Met: [] Adjusted  Comment:  Still restricted to [address] on the R LE. Unable to tolerate much weight bearing on the L due to same injury on the L knee. 5. Patient will be able to navigate stairs up/down with reciprocal gait mechanics without increased symptoms or restriction to allow him normal household mobility without restricted access to certain parts of his home. [] Progressing: [] Met: [x] Not Met: [] Adjusted           Progression Towards Functional goals:  [x] Patient is progressing as expected towards functional goals listed. [] Progression is slowed due to complexities listed. [] Progression has been slowed due to co-morbidities. [] Plan just implemented, too soon to assess goals progression  [] Other:     ASSESSMENT: Noted significant improvement in gait mechanics walking into session today after work on mechanics via cone ambulation last visit. Patient comfortably ambulating outside of R knee post-op brace around her home including while she sleeps. Still wearing the brace with community ambulation just for safety purposes. Able to tolerate light progression in weight bearing CKC quad strengthening tasks today to include leg press, modified SLS balance on airex, and step ups to a higher step height, which was a good challenge for patient.  Patient continues to need cueing to drive through the heel and squeeze the quad to achieve terminal knee extension during concentric phase of the step up. Will continue to progress weight bearing strength tasks as sandie to allow full return to PLOF without limitations. Return to Play: (if applicable)   []  Stage 1: Intro to Strength   []  Stage 2: Return to Run and Strength   []  Stage 3: Return to Jump and Strength   []  Stage 4: Dynamic Strength and Agility   []  Stage 5: Sport Specific Training     []  Ready to Return to Play, Meets All Above Stages   []  Not Ready for Return to Sports   Comments:            Treatment/Activity Tolerance:  [x] Patient tolerated treatment well [] Patient limited by fatique  [] Patient limited by pain  [] Patient limited by other medical complications  [] Other:     Overall Progression Towards Functional goals/ Treatment Progress Update:  [x] Patient is progressing as expected towards functional goals listed. [] Progression is slowed due to complexities/Impairments listed. [] Progression has been slowed due to co-morbidities. [] Plan just implemented, too soon to assess goals progression <30days   [] Goals require adjustment due to lack of progress  [] Patient is not progressing as expected and requires additional follow up with physician  [] Other    Prognosis for POC: [x] Good [] Fair  [] Poor    Patient requires continued skilled intervention: [x] Yes  [] No        PLAN: Progress weight bearing as sandie. [x] Continue per plan of care [] Alter current plan (see comments)  [] Plan of care initiated [] Hold pending MD visit [] Discharge    Electronically signed by: Yesenia Pena, PT, DPT, MS, SCS    Note: If patient does not return for scheduled/recommended follow up visits, this note will serve as a discharge from care along with the most recent update on progress.

## 2021-11-22 ENCOUNTER — HOSPITAL ENCOUNTER (OUTPATIENT)
Dept: PHYSICAL THERAPY | Age: 61
Setting detail: THERAPIES SERIES
Discharge: HOME | End: 2021-11-22
Payer: COMMERCIAL

## 2021-11-22 PROCEDURE — 97112 NEUROMUSCULAR REEDUCATION: CPT

## 2021-11-22 PROCEDURE — 97110 THERAPEUTIC EXERCISES: CPT

## 2021-11-22 PROCEDURE — 97530 THERAPEUTIC ACTIVITIES: CPT

## 2021-11-22 NOTE — PLAN OF CARE
Ben, [facility]      Physical Therapy Re-Certification Plan of Care/MD UPDATE      Dear Dr. Art Sawant,    We had the pleasure of treating the following patient for physical therapy services at [address] [place] [address]. A summary of our findings can be found in the updated assessment below. This includes our plan of care. If you have any questions or concerns regarding these findings, please do not hesitate to contact me at the office phone number checked above. Thank you for the referral.     Physician Signature:________________________________Date:__________________  By signing above (or electronic signature), therapists plan is approved by physician    Date Range Of Visits: 10/20/21 to 11/22/21  Total Visits to Date: 25  Overall Response to Treatment:   [x]Patient is responding well to treatment and improvement is noted with regards  to goals   []Patient should continue to improve in reasonable time if they continue HEP   []Patient has plateaued and is no longer responding to skilled PT intervention    []Patient is getting worse and would benefit from return to referring MD   []Patient unable to adhere to initial POC   [x]Other: Patient has been seen for 22 PT visits thus far. She is a little over 8 weeks s/p R knee scope and mensicus repair. Patient's R knee ROM is 0-125 with some mild tightness at end range, but no pain. Transitioning away from use of AD with gait at this time. Patient not using any AD or post-op brace on the R with all household ambulation, but still using both for safety with community ambulation. Gait mechanics much more natural at this time. Patient sees Dr. Velma Flowers for follow up this Wednesday. Initiated TRX squat taps to elevated chair today with emphasis on slow, controlled descent into the chair. Patient tends to offload one or the other LEs requiring cueing for equal weight bearing between LEs.  Initiated step ups and downs today to begin focusing on functional complaints of increased difficulty going down stairs > up likely due to continued eccentric R quad strength deficits. Considering BFR for further quad strengthening starting nv in preparation to load R LE to weight bear after L meniscus repair in December, but will discuss with physician. Will continue to progress weight bearing strength tasks as sandie to allow full return to PLOF without limitations. Patient to have L meniscus tear repaired in mid-December. Physical Therapy Treatment Note/ Progress Report:     Date:  2021    Patient Name:  Espinoza Suresh    :  1960  MRN: [id number]  Medical/Treatment Diagnosis Information:  · Diagnosis: M23.306 Other meniscus derangements, unspecified meniscus, R knee; M25.561 R knee pain  · Treatment Diagnosis: M23.306 Other meniscus derangements, unspecified meniscus, R knee; M25.561 R knee pain  Insurance/Certification information:  PT Insurance Information: [facility]  Physician Information:  Referring Practitioner: Mirna Green  Plan of care signed (Y/N):     Date of Patient follow up with Physician:      Progress Report: [x]  Yes  []  No     Functional Scale: 64% disability - LEFS ()   Date: 21    Date Range for reporting period:  Beginnin21  Endin days or 10 visits    Progress report due (10 Rx/or 30 days whichever is less): 41     Recertification due (POC duration/ or 90 days whichever is less): 21     Visit # Insurance Allowable Auth Needed   21 [facility] []Yes    [x]No     Pain level:  0/10 at start of session; 1-2/10 at worst.      SUBJECTIVE:    Visited some friends this weekend are renovating an older home in the area. Patient had to navigate some taller steps without a handrail to get into the house. Patient did find these steps to be more challenging to navigate than normal stairs. Going down stairs is still very difficult.  Sees Dr. Mariam Arellano for follow up Wednesday this week prior to PT session. OBJECTIVE: 11/22/21   Observation:    Test measurements:    HIP Abd      HIP Ext       HIP IR       HIP ER       Knee ext 0 0   Knee Flex 125 114 Tight. p! Ankle PF       Ankle DF       Ankle In       Ankle Ev       Strength  LEFT RIGHT   HIP Flexors  4-/5 4-/5    HIP Abductors 4-/5 4-/5   HIP Ext       Hip ER       Knee EXT (quad) 3+/5 p! 4-/5   No quad lag present with SLR. Able to move actively through LAQ ROM from 0-90 pain free at EOB. Knee Flex (HS) 4-/5 p! 3/5   Ankle DF       Ankle PF       Ankle Inv       Ankle EV               Circumference  Mid apex  7 cm prox        44.0 cm  50.0 cm     45.5 cm  50.5 cm     Gait:  Patient ambulating with SPC on the L for safety only; normal R knee flexion during swing phase; initial contact with heel and rocking onto toes appropriately during stance phase on the R; no longer wearing R knee post-op brace or using SPC for household ambulation; still using both brace and SPC for community ambulation just for safety reasons. RESTRICTIONS/PRECAUTIONS: High BP. Previous tobacco use (5 per day). R knee scope, meniscus repair on 9/17/21. NWBing on R LE for 6 weeks.     Exercises/Interventions:     Therapeutic Exercise Resistance Sets/sec Reps Notes   Recumbent bike   5 min    Gastroc stretch on SB  30s 5    Hamstring stretch Long sitting 30s 5    Ankle pumps HEP   Heelslides w/strap Supine 10s 10    Quad sets    SAQ over bolster    LAQ @EOB 0-90; 3# ankle weight 2 10 ROM restricted with step behind R LE   SLR flexion 3# 2 10    SLR abduction 3# 2 10    SLR adduction 2# 2 10    Leg press - 2 up SL down 60# 2 10                           Therapeutic Activities       Step ups on 6\"/downs to 2\"  2 10 Cues to drive through heel, achieve TKE on R LE, slow control down during eccentric phase   Cone ambulation no AD   3x Cues for increased R knee flexion during swing phase, heel initial contact   TRX squat taps to chair Elevated x1 foam 1 10                         Manual Intervention       R patella mobs - inferior, medial/lateral    Gr. I-II   R knee PROM - EOB, supine                                   NMR Re-education       [employer] - maze tracing - easy, medium, and difficult skill levels lvl 8  4x    CC TKE Resistance 4 5s 20    Mod. BOSU lunge isos  10s 10    Sport cord march (yellow)  2 10 Each position; Fwd/bwd   SLS balance (modified - L LE support as needed)  20s 3                    Access Code: [email or web address]  URL: [email or web address]  Date: 10/06/2021  Prepared by: Farhan Cuevas    Exercises  Sidelying Hip Abduction - 1-2 x daily - 7 x weekly - 2-3 sets - 10 reps  Sidelying Hip Adduction - 1-2 x daily - 7 x weekly - 2-3 sets - 10 reps  *In addition to pre-op exercises including hamstring stretch, gastroc stretch, heelslides, quad sets, SLR flexion. Therapeutic Exercise and NMR EXR  [x] (65768) Provided verbal/tactile cueing for activities related to strengthening, flexibility, endurance, ROM for improvements in LE, proximal hip, and core control with self care, mobility, lifting, ambulation. [x] (69732) Provided verbal/tactile cueing for activities related to improving balance, coordination, kinesthetic sense, posture, motor skill, proprioception  to assist with LE, proximal hip, and core control in self care, mobility, lifting, ambulation and eccentric single leg control.      NMR and Therapeutic Activities:    [x] (64604 or 65039) Provided verbal/tactile cueing for activities related to improving balance, coordination, kinesthetic sense, posture, motor skill, proprioception and motor activation to allow for proper function of core, proximal hip and LE with self care and ADLs  [x] (43951) Gait Re-education- Provided training and instruction to the patient for proper LE, core and proximal hip recruitment and positioning and eccentric body weight control with ambulation re-education including up and down stairs     Home Exercise Program:    [x] (84335) Reviewed/Progressed HEP activities related to strengthening, flexibility, endurance, ROM of core, proximal hip and LE for functional self-care, mobility, lifting and ambulation/stair navigation   [x] (03762)Reviewed/Progressed HEP activities related to improving balance, coordination, kinesthetic sense, posture, motor skill, proprioception of core, proximal hip and LE for self care, mobility, lifting, and ambulation/stair navigation      Manual Treatments:  PROM / STM / Oscillations-Mobs:  G-I, II, III, IV (PA's, Inf., Post.)  [x] (77908) Provided manual therapy to mobilize LE, proximal hip and/or LS spine soft tissue/joints for the purpose of modulating pain, promoting relaxation,  increasing ROM, reducing/eliminating soft tissue swelling/inflammation/restriction, improving soft tissue extensibility and allowing for proper ROM for normal function with self care, mobility, lifting and ambulation. Modalities:   Deferred. 11/22    Charges:  Timed Code Treatment Minutes: 50   Total Treatment Minutes: 50       [] EVAL (LOW) 82658 (typically 20 minutes face-to-face)   [] EVAL (MOD) 58786 (typically 30 minutes face-to-face)  [] EVAL (HIGH) 77823 (typically 45 minutes face-to-face)  [] RE-EVAL     [x] HP(16115) x 1    [] IONTO (91772)  [x] NMR (34779) x 1   [] VASO (58211)  [] Manual (90382) x     [] Other:  [x] TA (99767)x 1   [] Mech Traction (81907)  [] ES(attended) (05573)     [] ES (un) (26636):     GOALS:  Patient stated goal: Be able to do normal activities. [x] Progressing: [] Met: [] Not Met: [] Adjusted    Therapist goals for Patient:   Short Term Goals: To be achieved in: 2 weeks  1. Independent in HEP and progression per patient tolerance, in order to prevent re-injury. [] Progressing: [x] Met: [] Not Met: [] Adjusted  2. Patient will have a decrease in pain to facilitate improvement in movement, function, and ADLs as indicated by Functional Deficits.   [] Progressing: [x] Met: [] Not Met: [] Adjusted     Long Term Goals: To be achieved in: 8 weeks  1. Disability index score of 20% or less for the LEFS to assist with reaching prior level of function. [x] Progressing: [] Met: [] Not Met: [] Adjusted Comment:  64% disability on LEFS as of 11/22/21  2. Patient will demonstrate increased AROM to 0-120 to allow for proper joint functioning as indicated by patients Functional Deficits. [] Progressing: [x] Met: [] Not Met: [] Adjusted  Comment:  0-125  3. Patient will demonstrate an increase in strength to good proximal hip strength and control, within 5lb HHD in LE to allow for proper functional mobility as indicated by patients Functional Deficits. [x] Progressing: [] Met: [] Not Met: [] Adjusted Comment:  No quad lag with SLR. Able to perform LAQ at EOB 0-90 actively pain free. Overall, strength activities progressing. Still having functional difficulties secondary to eccentric quad strength deficits including difficulty getting in/out of a chair and navigating up/down stairs. 4. Patient will demonstrate normal gait mechanics without increased symptoms or restriction to allow him to walk and perform all daily mobility. [] Progressing: [x] Met: [] Not Met: [] Adjusted Comment:  Using R knee post-op brace and SPC for community ambulation only for safety reasons. Gait mechanics without AD and knee brace much improved. 5. Patient will be able to navigate stairs up/down with reciprocal gait mechanics without increased symptoms or restriction to allow him normal household mobility without restricted access to certain parts of his home. [x] Progressing: [] Met: [] Not Met: [] Adjusted           Progression Towards Functional goals:  [x] Patient is progressing as expected towards functional goals listed. [] Progression is slowed due to complexities listed. [] Progression has been slowed due to co-morbidities.   [] Plan just implemented, too soon to assess goals progression  [] Other: ASSESSMENT:  Patient has been seen for 22 PT visits thus far. She is a little over 8 weeks s/p R knee scope and mensicus repair. Patient's R knee ROM is 0-125 with some mild tightness at end range, but no pain. Transitioning away from use of AD with gait at this time. Patient not using any AD or post-op brace on the R with all household ambulation, but still using both for safety with community ambulation. Gait mechanics much more natural at this time. Patient sees Dr. Curtis Shah for follow up this Wednesday. Initiated TRX squat taps to elevated chair today with emphasis on slow, controlled descent into the chair. Patient tends to offload one or the other LEs requiring cueing for equal weight bearing between LEs. Initiated step ups and downs today to begin focusing on functional complaints of increased difficulty going down stairs > up likely due to continued eccentric R quad strength deficits. Considering BFR for further quad strengthening starting nv in preparation to load R LE to weight bear after L meniscus repair in December, but will discuss with physician. Will continue to progress weight bearing strength tasks as sandie to allow full return to PLOF without limitations. Patient to have L meniscus tear repaired in mid-December.     Return to Play: (if applicable)   []  Stage 1: Intro to Strength   []  Stage 2: Return to Run and Strength   []  Stage 3: Return to Jump and Strength   []  Stage 4: Dynamic Strength and Agility   []  Stage 5: Sport Specific Training     []  Ready to Return to Play, Meets All Above Stages   []  Not Ready for Return to Sports   Comments:            Treatment/Activity Tolerance:  [x] Patient tolerated treatment well [] Patient limited by fatique  [] Patient limited by pain  [] Patient limited by other medical complications  [] Other:     Overall Progression Towards Functional goals/ Treatment Progress Update:  [x] Patient is progressing as expected towards functional goals listed. [] Progression is slowed due to complexities/Impairments listed. [] Progression has been slowed due to co-morbidities. [] Plan just implemented, too soon to assess goals progression <30days   [] Goals require adjustment due to lack of progress  [] Patient is not progressing as expected and requires additional follow up with physician  [] Other    Prognosis for POC: [x] Good [] Fair  [] Poor    Patient requires continued skilled intervention: [x] Yes  [] No        PLAN: Progress weight bearing as sandie. Consider BFR. [x] Continue per plan of care [] Alter current plan (see comments)  [] Plan of care initiated [] Hold pending MD visit [] Discharge    Electronically signed by: Aneesh Ribera, PT, DPT, MS, SCS    Note: If patient does not return for scheduled/recommended follow up visits, this note will serve as a discharge from care along with the most recent update on progress.

## 2021-11-24 ENCOUNTER — HOSPITAL ENCOUNTER (OUTPATIENT)
Dept: PHYSICAL THERAPY | Age: 61
Setting detail: THERAPIES SERIES
Discharge: HOME | End: 2021-11-24
Payer: COMMERCIAL

## 2021-11-24 ENCOUNTER — OFFICE VISIT (OUTPATIENT)
Dept: ORTHOPEDIC SURGERY | Age: 61
End: 2021-11-24
Payer: COMMERCIAL

## 2021-11-24 VITALS — BODY MASS INDEX: 36.29 KG/M2 | WEIGHT: 245 LBS | HEIGHT: 69 IN

## 2021-11-24 DIAGNOSIS — S83.242A ACUTE MEDIAL MENISCUS TEAR OF LEFT KNEE, INITIAL ENCOUNTER: Primary | ICD-10-CM

## 2021-11-24 PROCEDURE — G8427 DOCREV CUR MEDS BY ELIG CLIN: HCPCS | Performed by: ORTHOPAEDIC SURGERY

## 2021-11-24 PROCEDURE — 1036F TOBACCO NON-USER: CPT | Performed by: ORTHOPAEDIC SURGERY

## 2021-11-24 PROCEDURE — G8484 FLU IMMUNIZE NO ADMIN: HCPCS | Performed by: ORTHOPAEDIC SURGERY

## 2021-11-24 PROCEDURE — G8417 CALC BMI ABV UP PARAM F/U: HCPCS | Performed by: ORTHOPAEDIC SURGERY

## 2021-11-24 PROCEDURE — 97530 THERAPEUTIC ACTIVITIES: CPT

## 2021-11-24 PROCEDURE — 97112 NEUROMUSCULAR REEDUCATION: CPT

## 2021-11-24 PROCEDURE — 97110 THERAPEUTIC EXERCISES: CPT

## 2021-11-24 PROCEDURE — 99214 OFFICE O/P EST MOD 30 MIN: CPT | Performed by: ORTHOPAEDIC SURGERY

## 2021-11-24 PROCEDURE — 3017F COLORECTAL CA SCREEN DOC REV: CPT | Performed by: ORTHOPAEDIC SURGERY

## 2021-11-24 NOTE — FLOWSHEET NOTE
[facility]    Physical Therapy Treatment Note/ Progress Report:     Date:  2021    Patient Name:  Albin Ortiz    :  1960  MRN: [id number]  Medical/Treatment Diagnosis Information:  · Diagnosis: M23.306 Other meniscus derangements, unspecified meniscus, R knee; M25.561 R knee pain  · Treatment Diagnosis: M23.306 Other meniscus derangements, unspecified meniscus, R knee; M25.561 R knee pain  Insurance/Certification information:  PT Insurance Information: [facility]  Physician Information:  Referring Practitioner: Myranda Lees  Plan of care signed (Y/N):     Date of Patient follow up with Physician:      Progress Report: []  Yes  [x]  No     Functional Scale: 64% disability - LEFS ()   Date: 21    Date Range for reporting period:  Beginnin21  Endin days or 10 visits    Progress report due (10 Rx/or 30 days whichever is less):      Recertification due (POC duration/ or 90 days whichever is less): 21     Visit # Insurance Allowable Auth Needed   [address] [place] [address] []Yes    [x]No     Pain level:  0/10 at start of session; 1-2/10 at worst.      SUBJECTIVE:    Followed up with Dr. Suad Duke today prior to today's PT session who discontinued patient use of post-op R knee brace. Dr. Philip Bullock was not happy with sutures coming through in incisions on the R knee. Dr. Philip Bullock will fully remove these sutures when patient is under anesthesia for the L knee meniscus repair. OBJECTIVE: 21   Observation:    Test measurements:    HIP Abd      HIP Ext       HIP IR       HIP ER       Knee ext 0 0   Knee Flex 125 114 Tight. p! Ankle PF       Ankle DF       Ankle In       Ankle Ev       Strength  LEFT RIGHT   HIP Flexors  4-/5 4-/5    HIP Abductors 4-/5 4-/5   HIP Ext       Hip ER       Knee EXT (quad) 3+/5 p! 4-/5   No quad lag present with SLR. Able to move actively through LAQ ROM from 0-90 pain free at EOB.    Knee Flex (HS) 4-/5 p! 3/5   Ankle DF       Ankle PF       Ankle Inv       Ankle EV               Circumference  Mid apex  7 cm prox        44.0 cm  50.0 cm     45.5 cm  50.5 cm     Gait:  Patient ambulating with SPC on the L for safety only; normal R knee flexion during swing phase; initial contact with heel and rocking onto toes appropriately during stance phase on the R; no longer wearing R knee post-op brace or using SPC for household ambulation; still using both brace and SPC for community ambulation just for safety reasons. RESTRICTIONS/PRECAUTIONS: High BP. Previous tobacco use (5 per day). R knee scope, meniscus repair on 9/17/21. NWBing on R LE for 6 weeks. Exercises/Interventions:     Therapeutic Exercise Resistance Sets/sec Reps Notes   Recumbent bike   5 min    Gastroc stretch on SB  30s 5    Hamstring stretch Long sitting 30s 5    Ankle pumps HEP   Heelslides w/strap Supine 10s 10    Quad sets    SAQ over bolster    LAQ @EOB 0-90; 3# ankle weight 2 10 Performed with BFR. SLR flexion 3# 2 10 Performed with BFR. SLR abduction 3# 2 10 Performed with BFR. SLR adduction 2# 2 10    [email or web address] LOP (140 mmHg) Blue cuff  8 min 30-15-15-15  SLR/sidelying hip abd/LAQ/standing hamstring curls           Leg press - 2 up SL down 60# 2 10                           Therapeutic Activities       Step ups/downs on 6\"  2 10 Cues to drive through heel, achieve TKE on R LE, slow control down during eccentric phase   TRX squat taps to chair Elevated x1 foam 1 10 Increased UE support on 11/24 due to fatigue                        Manual Intervention       R patella mobs - inferior, medial/lateral    Gr. I-II   R knee PROM - EOB, supine                                   NMR Re-education       [employer] - maze tracing - easy, medium, and difficult skill levels lvl 8  4x    CC TKE Resistance 4 5s 20    Mod. BOSU lunge isos  10s 10    Sport cord march (yellow)  2 10 Each position;  Fwd/bwd   SLS balance (modified - L LE support as needed)  20s 3                    Access Code: [email or web address]  URL: [email or web address]  Date: 10/06/2021  Prepared by: Sharia Cranker    Exercises  Sidelying Hip Abduction - 1-2 x daily - 7 x weekly - 2-3 sets - 10 reps  Sidelying Hip Adduction - 1-2 x daily - 7 x weekly - 2-3 sets - 10 reps  *In addition to pre-op exercises including hamstring stretch, gastroc stretch, heelslides, quad sets, SLR flexion. Spoke with Dr. Josue Rutherford regarding the use of BFR with patient. Bloodflow restriction was utilized throughout exercise session with use of Emi Unit for a period of 8 minutes at  60% Occlusion. Patient's total limp occlusion pressure [facility] the entire time of occlusion. Therapeutic Exercise and NMR EXR  [x] (03592) Provided verbal/tactile cueing for activities related to strengthening, flexibility, endurance, ROM for improvements in LE, proximal hip, and core control with self care, mobility, lifting, ambulation. [x] (83323) Provided verbal/tactile cueing for activities related to improving balance, coordination, kinesthetic sense, posture, motor skill, proprioception  to assist with LE, proximal hip, and core control in self care, mobility, lifting, ambulation and eccentric single leg control.      NMR and Therapeutic Activities:    [x] (18145 or 48032) Provided verbal/tactile cueing for activities related to improving balance, coordination, kinesthetic sense, posture, motor skill, proprioception and motor activation to allow for proper function of core, proximal hip and LE with self care and ADLs  [x] (42832) Gait Re-education- Provided training and instruction to the patient for proper LE, core and proximal hip recruitment and positioning and eccentric body weight control with ambulation re-education including up and down stairs     Home Exercise Program:    [x] (62382) Reviewed/Progressed HEP activities related to strengthening, flexibility, endurance, ROM of core, proximal hip and LE for functional self-care, mobility, lifting and ambulation/stair navigation   [x] (08936)Reviewed/Progressed HEP activities related to improving balance, coordination, kinesthetic sense, posture, motor skill, proprioception of core, proximal hip and LE for self care, mobility, lifting, and ambulation/stair navigation      Manual Treatments:  PROM / STM / Oscillations-Mobs:  G-I, II, III, IV (PA's, Inf., Post.)  [x] (39663) Provided manual therapy to mobilize LE, proximal hip and/or LS spine soft tissue/joints for the purpose of modulating pain, promoting relaxation,  increasing ROM, reducing/eliminating soft tissue swelling/inflammation/restriction, improving soft tissue extensibility and allowing for proper ROM for normal function with self care, mobility, lifting and ambulation. Modalities:   Deferred. 11/24    Charges:  Timed Code Treatment Minutes: 50   Total Treatment Minutes: 50       [] EVAL (LOW) 36696 (typically 20 minutes face-to-face)   [] EVAL (MOD) 19632 (typically 30 minutes face-to-face)  [] EVAL (HIGH) 62647 (typically 45 minutes face-to-face)  [] RE-EVAL     [x] RO(26813) x 1    [] IONTO (98113)  [x] NMR (65348) x 1   [] VASO (99988)  [] Manual (19844) x     [] Other:  [x] TA (36279)x 1   [] Mech Traction (65083)  [] ES(attended) (05945)     [] ES (un) (40327):     GOALS:  Patient stated goal: Be able to do normal activities. [x] Progressing: [] Met: [] Not Met: [] Adjusted    Therapist goals for Patient:   Short Term Goals: To be achieved in: 2 weeks  1. Independent in HEP and progression per patient tolerance, in order to prevent re-injury. [] Progressing: [x] Met: [] Not Met: [] Adjusted  2. Patient will have a decrease in pain to facilitate improvement in movement, function, and ADLs as indicated by Functional Deficits. [] Progressing: [x] Met: [] Not Met: [] Adjusted     Long Term Goals: To be achieved in: 8 weeks  1.  Disability index score of 20% or less for the LEFS to assist with reaching prior level of function. [x] Progressing: [] Met: [] Not Met: [] Adjusted Comment:  64% disability on LEFS as of 11/22/21  2. Patient will demonstrate increased AROM to 0-120 to allow for proper joint functioning as indicated by patients Functional Deficits. [] Progressing: [x] Met: [] Not Met: [] Adjusted  Comment:  0-125  3. Patient will demonstrate an increase in strength to good proximal hip strength and control, within 5lb HHD in LE to allow for proper functional mobility as indicated by patients Functional Deficits. [x] Progressing: [] Met: [] Not Met: [] Adjusted Comment:  No quad lag with SLR. Able to perform LAQ at EOB 0-90 actively pain free. Overall, strength activities progressing. Still having functional difficulties secondary to eccentric quad strength deficits including difficulty getting in/out of a chair and navigating up/down stairs. 4. Patient will demonstrate normal gait mechanics without increased symptoms or restriction to allow him to walk and perform all daily mobility. [] Progressing: [x] Met: [] Not Met: [] Adjusted Comment:  Using R knee post-op brace and SPC for community ambulation only for safety reasons. Gait mechanics without AD and knee brace much improved. 5. Patient will be able to navigate stairs up/down with reciprocal gait mechanics without increased symptoms or restriction to allow him normal household mobility without restricted access to certain parts of his home. [x] Progressing: [] Met: [] Not Met: [] Adjusted           Progression Towards Functional goals:  [x] Patient is progressing as expected towards functional goals listed. [] Progression is slowed due to complexities listed. [] Progression has been slowed due to co-morbidities. [] Plan just implemented, too soon to assess goals progression  [] Other:     ASSESSMENT:  Patient saw Dr. Holly Lopez for follow up prior to today's session.  Dr. Holly Lopez discontinued use of the post-op R knee brace for all ambulation. Dr. Ivory Kerr plans on removing sutures that are coming through the large incision site on the R knee when patient is under anesthesia to have her L meniscus repaired on December 17th. Initiated BFR at 60% LOP during today's session for R quad strengthening with low loads to prepare the R knee to bear the brunt of the load following L knee surgery. Patient tolerated very well, but was pretty fatigued at end of session. Return to Play: (if applicable)   []  Stage 1: Intro to Strength   []  Stage 2: Return to Run and Strength   []  Stage 3: Return to Jump and Strength   []  Stage 4: Dynamic Strength and Agility   []  Stage 5: Sport Specific Training     []  Ready to Return to Play, Meets All Above Stages   []  Not Ready for Return to Sports   Comments:            Treatment/Activity Tolerance:  [x] Patient tolerated treatment well [] Patient limited by fatique  [] Patient limited by pain  [] Patient limited by other medical complications  [] Other:     Overall Progression Towards Functional goals/ Treatment Progress Update:  [x] Patient is progressing as expected towards functional goals listed. [] Progression is slowed due to complexities/Impairments listed. [] Progression has been slowed due to co-morbidities. [] Plan just implemented, too soon to assess goals progression <30days   [] Goals require adjustment due to lack of progress  [] Patient is not progressing as expected and requires additional follow up with physician  [] Other    Prognosis for POC: [x] Good [] Fair  [] Poor    Patient requires continued skilled intervention: [x] Yes  [] No        PLAN: Progress weight bearing CKC strengthening as sandie. Progress BFR as sandie.   [x] Continue per plan of care [] Alter current plan (see comments)  [] Plan of care initiated [] Hold pending MD visit [] Discharge    Electronically signed by: Judith Rosas, PT, DPT, MS, SCS    Note: If patient does not return for scheduled/recommended follow up visits, this note will serve as a discharge from care along with the most recent update on progress.

## 2021-11-26 NOTE — TELEPHONE ENCOUNTER
APPROVED  CPT: 17618  AUTH: [id number]  DATE RANGE: 12/17/21 TO 12/31/21  INSURANCE: [facility]  LOCATION   NOTE: DOC SCANNED

## 2021-11-29 ENCOUNTER — HOSPITAL ENCOUNTER (OUTPATIENT)
Dept: PHYSICAL THERAPY | Age: 61
Setting detail: THERAPIES SERIES
Discharge: HOME | End: 2021-11-29
Payer: COMMERCIAL

## 2021-11-29 PROCEDURE — 97110 THERAPEUTIC EXERCISES: CPT

## 2021-11-29 PROCEDURE — 97112 NEUROMUSCULAR REEDUCATION: CPT

## 2021-11-29 PROCEDURE — 97530 THERAPEUTIC ACTIVITIES: CPT

## 2021-11-29 NOTE — PROGRESS NOTES
2021     Interval History:  Left knee injury sustained on 2021  Status post right posterior medial meniscus root repair on 2021    The patient recently underwent a right knee posterior medial meniscus root repair with me on 2021. Right knee feels quite good and she is bearing weight without difficulty. However she does report that on the night of the surgery on 2021 she injured her left knee. Resulted in a meniscus root tear. She has been wearing a brace for this. She reports continued pain as well as catching and locking of the left knee. However she is continuing to get rehabilitation following the right knee. That is progressing quite well. Objective:  Ht 5' 9\" (1.753 m)   Wt 245 lb (111.1 kg)   BMI 36.18 kg/m²      Physical Exam:  Examination of the left knee   There is a + effusion, no gross deformity or skin changes  Range of motion reveals 0 to 120  neg lachman, negative posterior drawer, no pain or laxity with varus or valgus stress at 0 degrees and 30 degrees of flexion  + medial joint line tenderness  5 out of 5 strength throughout distal muscle groups  Sensation is intact to light touch throughout all distributions  There is no calf swelling or tenderness  Palpable DP pulse, brisk cap refill, 2+ symmetric reflexes    Right knee demonstrates that she is neuro vastly intact with minimal effusion and range of motion of 0 to 130 degrees    Imagin view x-rays of the left knee were obtained in the office today on 2021. There is no fracture or dislocation. No other abnormality. Preserved joint spaces. Part of the left knee was reviewed. There is a radial tear of the root attachment of the posterior medial meniscus. Extrusion of the body. 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     Assessment:  Left knee injury sustained on 2021  Status post right posterior medial meniscus root repair on 2021    Plan:  I had a very long discussion with the patient. We spent time reviewing the imaging findings of the left knee. Unfortunately she has sustained a meniscus root tear. Similar to the other side we discussed the biomechanical consequences of this type of tear. It does put her at increased risk for accelerated degeneration of the knee. However she is currently recovering from a right knee meniscus root repair. We both elected to proceed with left meniscus root repair. Risks were defined as not limited to infection, bleeding, damage to blood vessels or nerves, need for additional surgery. She does understand elects proceed. Greater than 30 minutes were spent with this encounter. Time spent included evaluating the patient's chart prior to arrival.  Evaluating the patient in the office including history, physical examination, imaging reviewing, and counseling on next steps. Lastly, time was spent discussing orders with my staff as well as providing documentation in the chart. Disclaimer: This note was dictated with voice recognition software. Though review and correction are routine, we apologize for any errors.

## 2021-11-29 NOTE — FLOWSHEET NOTE
[address] [facility]    Physical Therapy Treatment Note/ Progress Report:     Date:  2021    Patient Name:  Rozina Davis    :  1960  MRN: [id number]  Medical/Treatment Diagnosis Information:  · Diagnosis: M23.306 Other meniscus derangements, unspecified meniscus, R knee; M25.561 R knee pain  · Treatment Diagnosis: M23.306 Other meniscus derangements, unspecified meniscus, R knee; M25.561 R knee pain  Insurance/Certification information:  PT Insurance Information: [facility]  Physician Information:  Referring Practitioner: Emma Sequeira  Plan of care signed (Y/N):     Date of Patient follow up with Physician:      Progress Report: []  Yes  [x]  No     Functional Scale: 64% disability - LEFS ()   Date: 21    Date Range for reporting period:  Beginnin21  Endin days or 10 visits    Progress report due (10 Rx/or 30 days whichever is less):      Recertification due (POC duration/ or 90 days whichever is less): 21     Visit # Insurance Allowable Auth Needed   23 [facility] []Yes    [x]No     Pain level:  0/10 at start of session; 1-2/10 at worst.      SUBJECTIVE:    Reports significant fatigue after last session. R knee was not sore or achy though. OBJECTIVE: 21   Observation:    Test measurements:    HIP Abd      HIP Ext       HIP IR       HIP ER       Knee ext 0 0   Knee Flex 125 114 Tight. p! Ankle PF       Ankle DF       Ankle In       Ankle Ev       Strength  LEFT RIGHT   HIP Flexors  4-/5 4-/5    HIP Abductors 4-/5 4-/5   HIP Ext       Hip ER       Knee EXT (quad) 3+/5 p! 4-/5   No quad lag present with SLR. Able to move actively through LAQ ROM from 0-90 pain free at EOB.    Knee Flex (HS) 4-/5 p! 3/5   Ankle DF       Ankle PF       Ankle Inv       Ankle EV               Circumference  Mid apex  7 cm prox        44.0 cm  50.0 cm     45.5 cm  50.5 cm     Gait:  Patient ambulating with SPC on the L for safety only; normal R knee flexion during swing phase; initial contact with heel and rocking onto toes appropriately during stance phase on the R; no longer wearing R knee post-op brace or using SPC for household ambulation; still using both brace and SPC for community ambulation just for safety reasons. RESTRICTIONS/PRECAUTIONS: High BP. Previous tobacco use (5 per day). R knee scope, meniscus repair on 9/17/21. NWBing on R LE for 6 weeks. Exercises/Interventions:     Therapeutic Exercise Resistance Sets/sec Reps Notes   Recumbent bike   5 min    Gastroc stretch on SB  30s 5    Hamstring stretch Long sitting 30s 5    Ankle pumps HEP   Heelslides w/strap Supine 10s 10    Quad sets    SAQ over bolster    LAQ @EOB 0-90; 3# ankle weight 2 10 Performed with BFR. SLR flexion 3# 2 10 Performed with BFR. SLR abduction 3# 2 10 Performed with BFR. SLR adduction 2# 2 10    Rayden@[employer] LOP (140 mmHg) Blue cuff  8 min 30-15-15-15  SLR/sidelying hip abd/LAQ/standing hamstring curls           Leg press - 2 up SL down 60# 2 10                           Therapeutic Activities       Step ups/downs on 6\"  2 10 Cues to drive through heel, achieve TKE on R LE, slow control down during eccentric phase   TRX squat taps to chair Elevated x1 foam 1 10 Increased UE support on 11/24 due to fatigue                        Manual Intervention       R patella mobs - inferior, medial/lateral    Gr. I-II   R knee PROM - EOB, supine                                   NMR Re-education       [employer] - maze tracing - easy, medium, and difficult skill levels lvl 8  4x    CC TKE Resistance 4 5s 20    Mod. BOSU lunge isos  10s 10    Sport cord march (yellow)  2 10 Each position; Fwd/bwd   SLS balance (modified - L LE support as needed)  20s 3                    Access Code: [email or web address]  URL: [email or web address]  Date: 10/06/2021  Prepared by: Amy Esquivel    Exercises  Sidelying Hip Abduction - 1-2 x daily - 7 x weekly - 2-3 sets - 10 sense, posture, motor skill, proprioception of core, proximal hip and LE for self care, mobility, lifting, and ambulation/stair navigation      Manual Treatments:  PROM / STM / Oscillations-Mobs:  G-I, II, III, IV (PA's, Inf., Post.)  [x] (41363) Provided manual therapy to mobilize LE, proximal hip and/or LS spine soft tissue/joints for the purpose of modulating pain, promoting relaxation,  increasing ROM, reducing/eliminating soft tissue swelling/inflammation/restriction, improving soft tissue extensibility and allowing for proper ROM for normal function with self care, mobility, lifting and ambulation. Modalities:   Deferred. 11/29    Charges:  Timed Code Treatment Minutes: 50   Total Treatment Minutes: 50       [] EVAL (LOW) 42655 (typically 20 minutes face-to-face)   [] EVAL (MOD) 91446 (typically 30 minutes face-to-face)  [] EVAL (HIGH) 32828 (typically 45 minutes face-to-face)  [] RE-EVAL     [x] NH(21536) x 1    [] IONTO (50252)  [x] NMR (07591) x 1   [] VASO (55988)  [] Manual (97339) x     [] Other:  [x] TA (50897)x 1   [] Mech Traction (20081)  [] ES(attended) (63176)     [] ES (un) (22297):     GOALS:  Patient stated goal: Be able to do normal activities. [x] Progressing: [] Met: [] Not Met: [] Adjusted    Therapist goals for Patient:   Short Term Goals: To be achieved in: 2 weeks  1. Independent in HEP and progression per patient tolerance, in order to prevent re-injury. [] Progressing: [x] Met: [] Not Met: [] Adjusted  2. Patient will have a decrease in pain to facilitate improvement in movement, function, and ADLs as indicated by Functional Deficits. [] Progressing: [x] Met: [] Not Met: [] Adjusted     Long Term Goals: To be achieved in: 8 weeks  1. Disability index score of 20% or less for the LEFS to assist with reaching prior level of function. [x] Progressing: [] Met: [] Not Met: [] Adjusted Comment:  64% disability on LEFS as of 11/22/21  2.  Patient will demonstrate increased AROM to 0-120 to allow for proper joint functioning as indicated by patients Functional Deficits. [] Progressing: [x] Met: [] Not Met: [] Adjusted  Comment:  0-125  3. Patient will demonstrate an increase in strength to good proximal hip strength and control, within 5lb HHD in LE to allow for proper functional mobility as indicated by patients Functional Deficits. [x] Progressing: [] Met: [] Not Met: [] Adjusted Comment:  No quad lag with SLR. Able to perform LAQ at EOB 0-90 actively pain free. Overall, strength activities progressing. Still having functional difficulties secondary to eccentric quad strength deficits including difficulty getting in/out of a chair and navigating up/down stairs. 4. Patient will demonstrate normal gait mechanics without increased symptoms or restriction to allow him to walk and perform all daily mobility. [] Progressing: [x] Met: [] Not Met: [] Adjusted Comment:  Using R knee post-op brace and SPC for community ambulation only for safety reasons. Gait mechanics without AD and knee brace much improved. 5. Patient will be able to navigate stairs up/down with reciprocal gait mechanics without increased symptoms or restriction to allow him normal household mobility without restricted access to certain parts of his home. [x] Progressing: [] Met: [] Not Met: [] Adjusted           Progression Towards Functional goals:  [x] Patient is progressing as expected towards functional goals listed. [] Progression is slowed due to complexities listed. [] Progression has been slowed due to co-morbidities. [] Plan just implemented, too soon to assess goals progression  [] Other:     ASSESSMENT:  Patient very fatigued following initiated BFR previous session, but denies any significant soreness or pain in the R knee. Minimal change in POC today due to complaints of significant fatigue following advancements previous session. Will continue to progress as sandie.     Return to Play: (if applicable)   [] Stage 1: Intro to Strength   []  Stage 2: Return to Run and Strength   []  Stage 3: Return to Jump and Strength   []  Stage 4: Dynamic Strength and Agility   []  Stage 5: Sport Specific Training     []  Ready to Return to Play, Meets All Above Stages   []  Not Ready for Return to Sports   Comments:            Treatment/Activity Tolerance:  [x] Patient tolerated treatment well [] Patient limited by fatique  [] Patient limited by pain  [] Patient limited by other medical complications  [] Other:     Overall Progression Towards Functional goals/ Treatment Progress Update:  [x] Patient is progressing as expected towards functional goals listed. [] Progression is slowed due to complexities/Impairments listed. [] Progression has been slowed due to co-morbidities. [] Plan just implemented, too soon to assess goals progression <30days   [] Goals require adjustment due to lack of progress  [] Patient is not progressing as expected and requires additional follow up with physician  [] Other    Prognosis for POC: [x] Good [] Fair  [] Poor    Patient requires continued skilled intervention: [x] Yes  [] No        PLAN: Progress weight bearing CKC strengthening as sandie. Progress BFR as sandie. [x] Continue per plan of care [] Alter current plan (see comments)  [] Plan of care initiated [] Hold pending MD visit [] Discharge    Electronically signed by: Eunice Lorenzo, PT, DPT, MS, SCS    Note: If patient does not return for scheduled/recommended follow up visits, this note will serve as a discharge from care along with the most recent update on progress.

## 2021-12-01 ENCOUNTER — HOSPITAL ENCOUNTER (OUTPATIENT)
Dept: PHYSICAL THERAPY | Age: 61
Setting detail: THERAPIES SERIES
Discharge: HOME | End: 2021-12-01
Payer: COMMERCIAL

## 2021-12-01 PROCEDURE — 97112 NEUROMUSCULAR REEDUCATION: CPT

## 2021-12-01 PROCEDURE — 97530 THERAPEUTIC ACTIVITIES: CPT

## 2021-12-01 PROCEDURE — 97110 THERAPEUTIC EXERCISES: CPT

## 2021-12-01 NOTE — FLOWSHEET NOTE
[facility]    Physical Therapy Treatment Note/ Progress Report:     Date:  2021    Patient Name:  Amalia Gutierrez    :  1960  MRN: [id number]  Medical/Treatment Diagnosis Information:  · Diagnosis: M23.306 Other meniscus derangements, unspecified meniscus, R knee; M25.561 R knee pain  · Treatment Diagnosis: M23.306 Other meniscus derangements, unspecified meniscus, R knee; M25.561 R knee pain  Insurance/Certification information:  PT Insurance Information: [facility]  Physician Information:  Referring Practitioner: Cabrera Roth  Plan of care signed (Y/N):     Date of Patient follow up with Physician:      Progress Report: []  Yes  [x]  No     Functional Scale: 64% disability - LEFS ()   Date: 21    Date Range for reporting period:  Beginnin21  Endin days or 10 visits    Progress report due (10 Rx/or 30 days whichever is less):      Recertification due (POC duration/ or 90 days whichever is less): 21     Visit # Insurance Allowable Auth Needed   24 [facility] []Yes    [x]No     Pain level:  0/10 at start of session; 1-2/10 at worst.      SUBJECTIVE:    R leg was fatigued for about an hour following last session, but it didn't seem to affect her too much for the rest of the day after that. Both legs were very fatigued yesterday because she ran a bunch of errands with her  and then went to her grandson's christening at [employer], which required lots of transitions from seated to standing during the ceremony. Patient had to pull herself up to standing by the end of the ceremony because her legs were so tired. OBJECTIVE: 21   Observation:    Test measurements:    HIP Abd      HIP Ext       HIP IR       HIP ER       Knee ext 0 0   Knee Flex 125 114 Tight. p!    Ankle PF       Ankle DF       Ankle In       Ankle Ev       Strength  LEFT RIGHT   HIP Flexors  4-/5 4-/5    HIP Abductors 4-/5 4-/5   HIP Ext Hip ER       Knee EXT (quad) 3+/5 p! 4-/5   No quad lag present with SLR. Able to move actively through LAQ ROM from 0-90 pain free at EOB. Knee Flex (HS) 4-/5 p! 3/5   Ankle DF       Ankle PF       Ankle Inv       Ankle EV               Circumference  Mid apex  7 cm prox        44.0 cm  50.0 cm     45.5 cm  50.5 cm     Gait:  Patient ambulating with SPC on the L for safety only; normal R knee flexion during swing phase; initial contact with heel and rocking onto toes appropriately during stance phase on the R; no longer wearing R knee post-op brace or using SPC for household ambulation; still using both brace and SPC for community ambulation just for safety reasons. RESTRICTIONS/PRECAUTIONS: High BP. Previous tobacco use (5 per day). R knee scope, meniscus repair on 9/17/21. NWBing on R LE for 6 weeks. Exercises/Interventions:     Therapeutic Exercise Resistance Sets/sec Reps Notes   Recumbent bike   5 min    Gastroc stretch on SB  30s 5    Hamstring stretch Long sitting 30s 5    Ankle pumps HEP   Heelslides w/strap Supine 10s 10    Quad sets    SAQ over bolster    LAQ @EOB 0-90; 3# ankle weight 2 10 Performed with BFR. SLR flexion 3# 2 10 Performed with BFR. SLR abduction 3# 2 10 Performed with BFR. SLR adduction 2# 2 10    Sathish@[employer] LOP (140 mmHg) Blue cuff  8 min 30-15-15-15  SLR/sidelying hip abd/LAQ/standing hamstring curls           Leg press - 2 up SL down 60# 2 10                           Therapeutic Activities       Step ups on 8\"/downs on 2\"  2 10 Cues to drive through heel, achieve TKE on R LE, slow control down during eccentric phase   TRX squat taps to chair Elevated x1 foam 2 10                         Manual Intervention       R patella mobs - inferior, medial/lateral    Gr. I-II   R knee PROM - EOB, supine                                   NMR Re-education       [employer] - maze tracing - easy, medium, and difficult skill levels lvl 8  4x    CC TKE Resistance 4 5s 20    Mod.  BOSU lunge isos  10s 10    Sport cord march (yellow)  2 10 Each position; Fwd/bwd   SLS balance (modified - L LE support as needed)  20s 3    Reverse lunge w/slider  1 15             Access Code: [email or web address]  URL: [email or web address]  Date: 10/06/2021  Prepared by: Nathan Recio    Exercises  Sidelying Hip Abduction - 1-2 x daily - 7 x weekly - 2-3 sets - 10 reps  Sidelying Hip Adduction - 1-2 x daily - 7 x weekly - 2-3 sets - 10 reps  *In addition to pre-op exercises including hamstring stretch, gastroc stretch, heelslides, quad sets, SLR flexion. Spoke with Dr. David Paredes regarding the use of BFR with patient. Bloodflow restriction was utilized throughout exercise session with use of [facility] for a period of 8 minutes at  60% Occlusion. Patient's total limp occlusion pressure [facility] the entire time of occlusion. Therapeutic Exercise and NMR EXR  [x] (95665) Provided verbal/tactile cueing for activities related to strengthening, flexibility, endurance, ROM for improvements in LE, proximal hip, and core control with self care, mobility, lifting, ambulation. [x] (56267) Provided verbal/tactile cueing for activities related to improving balance, coordination, kinesthetic sense, posture, motor skill, proprioception  to assist with LE, proximal hip, and core control in self care, mobility, lifting, ambulation and eccentric single leg control.      NMR and Therapeutic Activities:    [x] (60328 or 21150) Provided verbal/tactile cueing for activities related to improving balance, coordination, kinesthetic sense, posture, motor skill, proprioception and motor activation to allow for proper function of core, proximal hip and LE with self care and ADLs  [x] (12380) Gait Re-education- Provided training and instruction to the patient for proper LE, core and proximal hip recruitment and positioning and eccentric body weight control with ambulation re-education including up and down stairs     Home Exercise Program:    [x] (85814) Reviewed/Progressed HEP activities related to strengthening, flexibility, endurance, ROM of core, proximal hip and LE for functional self-care, mobility, lifting and ambulation/stair navigation   [x] (15132)Reviewed/Progressed HEP activities related to improving balance, coordination, kinesthetic sense, posture, motor skill, proprioception of core, proximal hip and LE for self care, mobility, lifting, and ambulation/stair navigation      Manual Treatments:  PROM / STM / Oscillations-Mobs:  G-I, II, III, IV (PA's, Inf., Post.)  [x] (57515) Provided manual therapy to mobilize LE, proximal hip and/or LS spine soft tissue/joints for the purpose of modulating pain, promoting relaxation,  increasing ROM, reducing/eliminating soft tissue swelling/inflammation/restriction, improving soft tissue extensibility and allowing for proper ROM for normal function with self care, mobility, lifting and ambulation. Modalities:   Deferred. 12/1    Charges:  Timed Code Treatment Minutes: 50   Total Treatment Minutes: 50       [] EVAL (LOW) 00467 (typically 20 minutes face-to-face)   [] EVAL (MOD) 71804 (typically 30 minutes face-to-face)  [] EVAL (HIGH) 84387 (typically 45 minutes face-to-face)  [] RE-EVAL     [x] PB(14832) x 1    [] IONTO (23927)  [x] NMR (35060) x 1   [] VASO (57781)  [] Manual (18472) x     [] Other:  [x] TA (57272)x 1   [] Mech Traction (42036)  [] ES(attended) (92095)     [] ES (un) (55742):     GOALS:  Patient stated goal: Be able to do normal activities. [x] Progressing: [] Met: [] Not Met: [] Adjusted    Therapist goals for Patient:   Short Term Goals: To be achieved in: 2 weeks  1. Independent in HEP and progression per patient tolerance, in order to prevent re-injury. [] Progressing: [x] Met: [] Not Met: [] Adjusted  2.  Patient will have a decrease in pain to facilitate improvement in movement, function, and ADLs as indicated by Functional Deficits. [] Progressing: [x] Met: [] Not Met: [] Adjusted     Long Term Goals: To be achieved in: 8 weeks  1. Disability index score of 20% or less for the LEFS to assist with reaching prior level of function. [x] Progressing: [] Met: [] Not Met: [] Adjusted Comment:  64% disability on LEFS as of 11/22/21  2. Patient will demonstrate increased AROM to 0-120 to allow for proper joint functioning as indicated by patients Functional Deficits. [] Progressing: [x] Met: [] Not Met: [] Adjusted  Comment:  0-125  3. Patient will demonstrate an increase in strength to good proximal hip strength and control, within 5lb HHD in LE to allow for proper functional mobility as indicated by patients Functional Deficits. [x] Progressing: [] Met: [] Not Met: [] Adjusted Comment:  No quad lag with SLR. Able to perform LAQ at EOB 0-90 actively pain free. Overall, strength activities progressing. Still having functional difficulties secondary to eccentric quad strength deficits including difficulty getting in/out of a chair and navigating up/down stairs. 4. Patient will demonstrate normal gait mechanics without increased symptoms or restriction to allow him to walk and perform all daily mobility. [] Progressing: [x] Met: [] Not Met: [] Adjusted Comment:  Using R knee post-op brace and SPC for community ambulation only for safety reasons. Gait mechanics without AD and knee brace much improved. 5. Patient will be able to navigate stairs up/down with reciprocal gait mechanics without increased symptoms or restriction to allow him normal household mobility without restricted access to certain parts of his home. [x] Progressing: [] Met: [] Not Met: [] Adjusted           Progression Towards Functional goals:  [x] Patient is progressing as expected towards functional goals listed. [] Progression is slowed due to complexities listed. [] Progression has been slowed due to co-morbidities.   [] Plan just implemented, too soon to assess goals progression  [] Other:     ASSESSMENT:  Patient fatigued requiring rest for about an hour following previous session, which is improved compared to when BFR was first initiated last week. Patient still reporting significant fatigue after repetitive sit to standing or prolonged walking consistent with continued strength deficits on the R knee and difficulty and pain with knee flexion tasks on the L due to meniscus tear. Able to progress CKC strength, stability, and motor control tasks to include reverse lunge with slider today. Return to Play: (if applicable)   []  Stage 1: Intro to Strength   []  Stage 2: Return to Run and Strength   []  Stage 3: Return to Jump and Strength   []  Stage 4: Dynamic Strength and Agility   []  Stage 5: Sport Specific Training     []  Ready to Return to Play, Meets All Above Stages   []  Not Ready for Return to Sports   Comments:            Treatment/Activity Tolerance:  [x] Patient tolerated treatment well [] Patient limited by fatique  [] Patient limited by pain  [] Patient limited by other medical complications  [] Other:     Overall Progression Towards Functional goals/ Treatment Progress Update:  [x] Patient is progressing as expected towards functional goals listed. [] Progression is slowed due to complexities/Impairments listed. [] Progression has been slowed due to co-morbidities. [] Plan just implemented, too soon to assess goals progression <30days   [] Goals require adjustment due to lack of progress  [] Patient is not progressing as expected and requires additional follow up with physician  [] Other    Prognosis for POC: [x] Good [] Fair  [] Poor    Patient requires continued skilled intervention: [x] Yes  [] No        PLAN: Progress weight bearing CKC strengthening as sandie. Progress BFR as sandie.   [x] Continue per plan of care [] Alter current plan (see comments)  [] Plan of care initiated [] Hold pending MD visit [] Discharge    Electronically signed by: Angelo Muñoz, PT, DPT, MS, SCS    Note: If patient does not return for scheduled/recommended follow up visits, this note will serve as a discharge from care along with the most recent update on progress.

## 2021-12-06 ENCOUNTER — HOSPITAL ENCOUNTER (OUTPATIENT)
Dept: PHYSICAL THERAPY | Age: 61
Setting detail: THERAPIES SERIES
Discharge: HOME | End: 2021-12-06
Payer: COMMERCIAL

## 2021-12-06 PROCEDURE — 97112 NEUROMUSCULAR REEDUCATION: CPT

## 2021-12-06 PROCEDURE — 97110 THERAPEUTIC EXERCISES: CPT

## 2021-12-06 PROCEDURE — 97530 THERAPEUTIC ACTIVITIES: CPT

## 2021-12-06 NOTE — FLOWSHEET NOTE
[facility]    Physical Therapy Treatment Note/ Progress Report:     Date:  2021    Patient Name:  Zachary George    :  1960  MRN: [id number]  Medical/Treatment Diagnosis Information:  · Diagnosis: M23.306 Other meniscus derangements, unspecified meniscus, R knee; M25.561 R knee pain  · Treatment Diagnosis: M23.306 Other meniscus derangements, unspecified meniscus, R knee; M25.561 R knee pain  Insurance/Certification information:  PT Insurance Information: [facility]  Physician Information:  Referring Practitioner: Harish Clinton  Plan of care signed (Y/N):     Date of Patient follow up with Physician:      Progress Report: []  Yes  [x]  No     Functional Scale: 64% disability - LEFS ()   Date: 21    Date Range for reporting period:  Beginnin21  Endin days or 10 visits    Progress report due (10 Rx/or 30 days whichever is less): 75//     Recertification due (POC duration/ or 90 days whichever is less): 21     Visit # Insurance Allowable Auth Needed   25 [facility] []Yes    [x]No     Pain level:  0/10 at start of session; 1-2/10 at worst.      SUBJECTIVE:    Doing well overall. Patient has gone up/down the stairs in her house several times on her own without having to stop and rest. Going down the stairs is still a challenge, but only painful on the L, not the R knee. Definitely can tell that BFR is helping with her strength levels. L knee meniscal repair surgery scheduled for . OBJECTIVE: 21   Observation:    Test measurements:    HIP Abd      HIP Ext       HIP IR       HIP ER       Knee ext 0 0   Knee Flex 125 114 Tight. p! Ankle PF       Ankle DF       Ankle In       Ankle Ev       Strength  LEFT RIGHT   HIP Flexors  4-/5 4-/5    HIP Abductors 4-/5 4-/5   HIP Ext       Hip ER       Knee EXT (quad) 3+/5 p! 4-/5   No quad lag present with SLR.  Able to move actively through LAQ ROM from 0-90 10             Access Code: [email or web address]  URL: [email or web address]  Date: 10/06/2021  Prepared by: Oneida Horta    Exercises  Sidelying Hip Abduction - 1-2 x daily - 7 x weekly - 2-3 sets - 10 reps  Sidelying Hip Adduction - 1-2 x daily - 7 x weekly - 2-3 sets - 10 reps  *In addition to pre-op exercises including hamstring stretch, gastroc stretch, heelslides, quad sets, SLR flexion. Spoke with Dr. Monroe Mayen regarding the use of BFR with patient. Bloodflow restriction was utilized throughout exercise session with use of Emi Unit for a period of 8 minutes at  60% Occlusion. Patient's total limp occlusion pressure [facility] calculated and [facility] for the entire time of occlusion. Therapeutic Exercise and NMR EXR  [x] (83444) Provided verbal/tactile cueing for activities related to strengthening, flexibility, endurance, ROM for improvements in LE, proximal hip, and core control with self care, mobility, lifting, ambulation. [x] (30339) Provided verbal/tactile cueing for activities related to improving balance, coordination, kinesthetic sense, posture, motor skill, proprioception  to assist with LE, proximal hip, and core control in self care, mobility, lifting, ambulation and eccentric single leg control.      NMR and Therapeutic Activities:    [x] (14050 or 25473) Provided verbal/tactile cueing for activities related to improving balance, coordination, kinesthetic sense, posture, motor skill, proprioception and motor activation to allow for proper function of core, proximal hip and LE with self care and ADLs  [x] (11608) Gait Re-education- Provided training and instruction to the patient for proper LE, core and proximal hip recruitment and positioning and eccentric body weight control with ambulation re-education including up and down stairs     Home Exercise Program:    [x] (41193) Reviewed/Progressed HEP activities related to strengthening, flexibility, endurance, ROM of core, proximal hip and LE for functional self-care, mobility, lifting and ambulation/stair navigation   [x] (87837)Reviewed/Progressed HEP activities related to improving balance, coordination, kinesthetic sense, posture, motor skill, proprioception of core, proximal hip and LE for self care, mobility, lifting, and ambulation/stair navigation      Manual Treatments:  PROM / STM / Oscillations-Mobs:  G-I, II, III, IV (PA's, Inf., Post.)  [x] (59081) Provided manual therapy to mobilize LE, proximal hip and/or LS spine soft tissue/joints for the purpose of modulating pain, promoting relaxation,  increasing ROM, reducing/eliminating soft tissue swelling/inflammation/restriction, improving soft tissue extensibility and allowing for proper ROM for normal function with self care, mobility, lifting and ambulation. Modalities:   Deferred. 12/6    Charges:  Timed Code Treatment Minutes: 50   Total Treatment Minutes: 50       [] EVAL (LOW) 66490 (typically 20 minutes face-to-face)   [] EVAL (MOD) 13748 (typically 30 minutes face-to-face)  [] EVAL (HIGH) 11266 (typically 45 minutes face-to-face)  [] RE-EVAL     [x] CS(46538) x 1    [] IONTO (26543)  [x] NMR (08611) x 1   [] VASO (29051)  [] Manual (78444) x     [] Other:  [x] TA (43105)x 1   [] Mech Traction (51145)  [] ES(attended) (13385)     [] ES (un) (28731):     GOALS:  Patient stated goal: Be able to do normal activities. [x] Progressing: [] Met: [] Not Met: [] Adjusted    Therapist goals for Patient:   Short Term Goals: To be achieved in: 2 weeks  1. Independent in HEP and progression per patient tolerance, in order to prevent re-injury. [] Progressing: [x] Met: [] Not Met: [] Adjusted  2. Patient will have a decrease in pain to facilitate improvement in movement, function, and ADLs as indicated by Functional Deficits. [] Progressing: [x] Met: [] Not Met: [] Adjusted     Long Term Goals: To be achieved in: 8 weeks  1.  Disability index score of 20% or less for the LEFS to assist with reaching prior level of function. [x] Progressing: [] Met: [] Not Met: [] Adjusted Comment:  64% disability on LEFS as of 11/22/21  2. Patient will demonstrate increased AROM to 0-120 to allow for proper joint functioning as indicated by patients Functional Deficits. [] Progressing: [x] Met: [] Not Met: [] Adjusted  Comment:  0-125  3. Patient will demonstrate an increase in strength to good proximal hip strength and control, within 5lb HHD in LE to allow for proper functional mobility as indicated by patients Functional Deficits. [x] Progressing: [] Met: [] Not Met: [] Adjusted Comment:  No quad lag with SLR. Able to perform LAQ at EOB 0-90 actively pain free. Overall, strength activities progressing. Still having functional difficulties secondary to eccentric quad strength deficits including difficulty getting in/out of a chair and navigating up/down stairs. 4. Patient will demonstrate normal gait mechanics without increased symptoms or restriction to allow him to walk and perform all daily mobility. [] Progressing: [x] Met: [] Not Met: [] Adjusted Comment:  Using R knee post-op brace and SPC for community ambulation only for safety reasons. Gait mechanics without AD and knee brace much improved. 5. Patient will be able to navigate stairs up/down with reciprocal gait mechanics without increased symptoms or restriction to allow him normal household mobility without restricted access to certain parts of his home. [x] Progressing: [] Met: [] Not Met: [] Adjusted           Progression Towards Functional goals:  [x] Patient is progressing as expected towards functional goals listed. [] Progression is slowed due to complexities listed. [] Progression has been slowed due to co-morbidities. [] Plan just implemented, too soon to assess goals progression  [] Other:     ASSESSMENT:  Able to progress BFR to 65% LOP today.   Initiated medial/lateral knee stabilization tasks with performance of

## 2021-12-08 ENCOUNTER — HOSPITAL ENCOUNTER (OUTPATIENT)
Dept: PHYSICAL THERAPY | Age: 61
Setting detail: THERAPIES SERIES
Discharge: HOME | End: 2021-12-08
Payer: COMMERCIAL

## 2021-12-08 PROCEDURE — 97112 NEUROMUSCULAR REEDUCATION: CPT

## 2021-12-08 PROCEDURE — 97110 THERAPEUTIC EXERCISES: CPT

## 2021-12-08 PROCEDURE — 97530 THERAPEUTIC ACTIVITIES: CPT

## 2021-12-08 NOTE — FLOWSHEET NOTE
[address] [facility]    Physical Therapy Treatment Note/ Progress Report:     Date:  2021    Patient Name:  Jose David Sifuentes    :  1960  MRN: [id number]  Medical/Treatment Diagnosis Information:  · Diagnosis: M23.306 Other meniscus derangements, unspecified meniscus, R knee; M25.561 R knee pain  · Treatment Diagnosis: M23.306 Other meniscus derangements, unspecified meniscus, R knee; M25.561 R knee pain  Insurance/Certification information:  PT Insurance Information: [facility]  Physician Information:  Referring Practitioner: Norma Cooper  Plan of care signed (Y/N):     Date of Patient follow up with Physician:      Progress Report: []  Yes  [x]  No     Functional Scale: 64% disability - LEFS ()   Date: 21    Date Range for reporting period:  Beginnin21  Endin days or 10 visits    Progress report due (10 Rx/or 30 days whichever is less):      Recertification due (POC duration/ or 90 days whichever is less): 21     Visit # Insurance Allowable Auth Needed   26 [facility] []Yes    [x]No     Pain level:  0/10 at start of session; 1-2/10 at worst.      SUBJECTIVE:    Doing well overall. Patient has gone up/down the stairs in her house several times on her own without having to stop and rest. Going down the stairs is still a challenge, but only painful on the L, not the R knee. Definitely can tell that BFR is helping with her strength levels. L knee meniscal repair surgery scheduled for . OBJECTIVE: 21   Observation:    Test measurements:    HIP Abd      HIP Ext       HIP IR       HIP ER       Knee ext 0 0   Knee Flex 125 114 Tight. p! Ankle PF       Ankle DF       Ankle In       Ankle Ev       Strength  LEFT RIGHT   HIP Flexors  4-/5 4-/5    HIP Abductors 4-/5 4-/5   HIP Ext       Hip ER       Knee EXT (quad) 3+/5 p! 4-/5   No quad lag present with SLR.  Able to move actively through LAQ ROM from 0-90 pain free at EOB. Knee Flex (HS) 4-/5 p! 3/5   Ankle DF       Ankle PF       Ankle Inv       Ankle EV               Circumference  Mid apex  7 cm prox        44.0 cm  50.0 cm     45.5 cm  50.5 cm     Gait:  Patient ambulating with SPC on the L for safety only; normal R knee flexion during swing phase; initial contact with heel and rocking onto toes appropriately during stance phase on the R; no longer wearing R knee post-op brace or using SPC for household ambulation; still using both brace and SPC for community ambulation just for safety reasons. RESTRICTIONS/PRECAUTIONS: High BP. Previous tobacco use (5 per day). R knee scope, meniscus repair on 9/17/21. NWBing on R LE for 6 weeks. Exercises/Interventions:     Therapeutic Exercise Resistance Sets/sec Reps Notes   Recumbent bike   5 min    Gastroc stretch on SB  30s 5    Hamstring stretch Long sitting 30s 5    Ankle pumps HEP   Heelslides w/strap Supine 10s 10    Quad sets    SAQ over bolster    LAQ @EOB 0-90; 3# ankle weight 2 10 Performed with BFR. SLR flexion 3# 2 10 Performed with BFR. SLR abduction 3# 2 10 Performed with BFR. SLR adduction 2# 2 10    [email or web address] Ignite LOP (155 mmHg) Blue cuff  8 min 30-15-15-15  SLR/sidelying hip abd/LAQ/standing hamstring curls           Leg press - 2 up SL down 60# 2 10                           Therapeutic Activities       Step ups/downs on 8\"  2 10 Cues to drive through heel, achieve TKE on R LE, slow control down during eccentric phase   TRX squat taps to chair Elevated x1 foam 2 10                         Manual Intervention       R patella mobs - inferior, medial/lateral    Gr. I-II   R knee PROM - EOB, supine                                   NMR Re-education       [employer] - maze tracing - easy, medium, and difficult skill levels lvl 8  4x    CC TKE Resistance 4 5s 20    Mod.  BOSU lunge isos  10s 10    Sport cord march (yellow)  2 10 Each position; side/side   SLS balance on airex  30s 5    Reverse lunge w/slider  2 10             Access Code: [email or web address]  URL: [email or web address]  Date: 10/06/2021  Prepared by: Shalom Huerta    Exercises  Sidelying Hip Abduction - 1-2 x daily - 7 x weekly - 2-3 sets - 10 reps  Sidelying Hip Adduction - 1-2 x daily - 7 x weekly - 2-3 sets - 10 reps  *In addition to pre-op exercises including hamstring stretch, gastroc stretch, heelslides, quad sets, SLR flexion. Spoke with Dr. Jeremie Resendiz regarding the use of BFR with patient. Bloodflow restriction was utilized throughout exercise session with use of Emi Unit for a period of 8 minutes at  60% Occlusion. Patient's total limp occlusion pressure [facility] the entire time of occlusion. Therapeutic Exercise and NMR EXR  [x] (29916) Provided verbal/tactile cueing for activities related to strengthening, flexibility, endurance, ROM for improvements in LE, proximal hip, and core control with self care, mobility, lifting, ambulation. [x] (57035) Provided verbal/tactile cueing for activities related to improving balance, coordination, kinesthetic sense, posture, motor skill, proprioception  to assist with LE, proximal hip, and core control in self care, mobility, lifting, ambulation and eccentric single leg control.      NMR and Therapeutic Activities:    [x] (94800 or 64510) Provided verbal/tactile cueing for activities related to improving balance, coordination, kinesthetic sense, posture, motor skill, proprioception and motor activation to allow for proper function of core, proximal hip and LE with self care and ADLs  [x] (18548) Gait Re-education- Provided training and instruction to the patient for proper LE, core and proximal hip recruitment and positioning and eccentric body weight control with ambulation re-education including up and down stairs     Home Exercise Program:    [x] (30405) Reviewed/Progressed HEP activities related to strengthening, flexibility, endurance, ROM of core, proximal hip and LE for functional self-care, mobility, lifting and ambulation/stair navigation   [x] (51798)Reviewed/Progressed HEP activities related to improving balance, coordination, kinesthetic sense, posture, motor skill, proprioception of core, proximal hip and LE for self care, mobility, lifting, and ambulation/stair navigation      Manual Treatments:  PROM / STM / Oscillations-Mobs:  G-I, II, III, IV (PA's, Inf., Post.)  [x] (27160) Provided manual therapy to mobilize LE, proximal hip and/or LS spine soft tissue/joints for the purpose of modulating pain, promoting relaxation,  increasing ROM, reducing/eliminating soft tissue swelling/inflammation/restriction, improving soft tissue extensibility and allowing for proper ROM for normal function with self care, mobility, lifting and ambulation. Modalities:   Deferred. 12/8    Charges:  Timed Code Treatment Minutes: 50   Total Treatment Minutes: 50       [] EVAL (LOW) 19095 (typically 20 minutes face-to-face)   [] EVAL (MOD) 81432 (typically 30 minutes face-to-face)  [] EVAL (HIGH) 69787 (typically 45 minutes face-to-face)  [] RE-EVAL     [x] TQ(00698) x 1    [] IONTO (64899)  [x] NMR (95860) x 1   [] VASO (46923)  [] Manual (64201) x     [] Other:  [x] TA (39108)x 1   [] Mech Traction (21794)  [] ES(attended) (85888)     [] ES (un) (69951):     GOALS:  Patient stated goal: Be able to do normal activities. [x] Progressing: [] Met: [] Not Met: [] Adjusted    Therapist goals for Patient:   Short Term Goals: To be achieved in: 2 weeks  1. Independent in HEP and progression per patient tolerance, in order to prevent re-injury. [] Progressing: [x] Met: [] Not Met: [] Adjusted  2. Patient will have a decrease in pain to facilitate improvement in movement, function, and ADLs as indicated by Functional Deficits. [] Progressing: [x] Met: [] Not Met: [] Adjusted     Long Term Goals: To be achieved in: 8 weeks  1.  Disability index score of 20% or less for the LEFS to assist with reaching prior level of function. [x] Progressing: [] Met: [] Not Met: [] Adjusted Comment:  64% disability on LEFS as of 11/22/21  2. Patient will demonstrate increased AROM to 0-120 to allow for proper joint functioning as indicated by patients Functional Deficits. [] Progressing: [x] Met: [] Not Met: [] Adjusted  Comment:  0-125  3. Patient will demonstrate an increase in strength to good proximal hip strength and control, within 5lb HHD in LE to allow for proper functional mobility as indicated by patients Functional Deficits. [x] Progressing: [] Met: [] Not Met: [] Adjusted Comment:  No quad lag with SLR. Able to perform LAQ at EOB 0-90 actively pain free. Overall, strength activities progressing. Still having functional difficulties secondary to eccentric quad strength deficits including difficulty getting in/out of a chair and navigating up/down stairs. 4. Patient will demonstrate normal gait mechanics without increased symptoms or restriction to allow him to walk and perform all daily mobility. [] Progressing: [x] Met: [] Not Met: [] Adjusted Comment:  Using R knee post-op brace and SPC for community ambulation only for safety reasons. Gait mechanics without AD and knee brace much improved. 5. Patient will be able to navigate stairs up/down with reciprocal gait mechanics without increased symptoms or restriction to allow him normal household mobility without restricted access to certain parts of his home. [x] Progressing: [] Met: [] Not Met: [] Adjusted           Progression Towards Functional goals:  [x] Patient is progressing as expected towards functional goals listed. [] Progression is slowed due to complexities listed. [] Progression has been slowed due to co-morbidities.   [] Plan just implemented, too soon to assess goals progression  [] Other:     ASSESSMENT:  Able to progress BFR to 70% LOP today to continue to advance R quad strengthening with low loads in preparation for L knee surgery this month. Patient doing better with navigating normal step height both up and down. Demonstrates improved eccentric control down off front of step with R LE compared to previous. Patient appropriately fatigued at end of session. L knee meniscal repair scheduled for December 17th. Return to Play: (if applicable)   []  Stage 1: Intro to Strength   []  Stage 2: Return to Run and Strength   []  Stage 3: Return to Jump and Strength   []  Stage 4: Dynamic Strength and Agility   []  Stage 5: Sport Specific Training     []  Ready to Return to Play, Meets All Above Stages   []  Not Ready for Return to Sports   Comments:            Treatment/Activity Tolerance:  [x] Patient tolerated treatment well [] Patient limited by fatique  [] Patient limited by pain  [] Patient limited by other medical complications  [] Other:     Overall Progression Towards Functional goals/ Treatment Progress Update:  [x] Patient is progressing as expected towards functional goals listed. [] Progression is slowed due to complexities/Impairments listed. [] Progression has been slowed due to co-morbidities. [] Plan just implemented, too soon to assess goals progression <30days   [] Goals require adjustment due to lack of progress  [] Patient is not progressing as expected and requires additional follow up with physician  [] Other    Prognosis for POC: [x] Good [] Fair  [] Poor    Patient requires continued skilled intervention: [x] Yes  [] No        PLAN: Progress weight bearing CKC strengthening as sandie. Progress BFR as sandie. [x] Continue per plan of care [] Alter current plan (see comments)  [] Plan of care initiated [] Hold pending MD visit [] Discharge    Electronically signed by: Desiree Garcia, PT, DPT, MS, SCS    Note: If patient does not return for scheduled/recommended follow up visits, this note will serve as a discharge from care along with the most recent update on progress.

## 2021-12-09 ENCOUNTER — TELEPHONE (OUTPATIENT)
Dept: ORTHOPEDIC SURGERY | Age: 61
End: 2021-12-09

## 2021-12-13 ENCOUNTER — HOSPITAL ENCOUNTER (OUTPATIENT)
Dept: PHYSICAL THERAPY | Age: 61
Setting detail: THERAPIES SERIES
Discharge: HOME | End: 2021-12-13
Payer: COMMERCIAL

## 2021-12-13 PROCEDURE — 97110 THERAPEUTIC EXERCISES: CPT

## 2021-12-13 PROCEDURE — 97530 THERAPEUTIC ACTIVITIES: CPT

## 2021-12-13 PROCEDURE — 97112 NEUROMUSCULAR REEDUCATION: CPT

## 2021-12-13 NOTE — FLOWSHEET NOTE
Catia    Exercises  Sidelying Hip Abduction - 1-2 x daily - 7 x weekly - 2-3 sets - 10 reps  Sidelying Hip Adduction - 1-2 x daily - 7 x weekly - 2-3 sets - 10 reps  *In addition to pre-op exercises including hamstring stretch, gastroc stretch, heelslides, quad sets, SLR flexion. Spoke with Dr. Geovanna Pena regarding the use of BFR with patient. Bloodflow restriction was utilized throughout exercise session with use of Emi Unit for a period of 8 minutes at  60% Occlusion. Patient's total limp occlusion pressure [facility] and [facility] the entire time of occlusion. Therapeutic Exercise and NMR EXR  [x] (41409) Provided verbal/tactile cueing for activities related to strengthening, flexibility, endurance, ROM for improvements in LE, proximal hip, and core control with self care, mobility, lifting, ambulation. [x] (67630) Provided verbal/tactile cueing for activities related to improving balance, coordination, kinesthetic sense, posture, motor skill, proprioception  to assist with LE, proximal hip, and core control in self care, mobility, lifting, ambulation and eccentric single leg control.      NMR and Therapeutic Activities:    [x] (67380 or 01323) Provided verbal/tactile cueing for activities related to improving balance, coordination, kinesthetic sense, posture, motor skill, proprioception and motor activation to allow for proper function of core, proximal hip and LE with self care and ADLs  [x] (08575) Gait Re-education- Provided training and instruction to the patient for proper LE, core and proximal hip recruitment and positioning and eccentric body weight control with ambulation re-education including up and down stairs     Home Exercise Program:    [x] (97878) Reviewed/Progressed HEP activities related to strengthening, flexibility, endurance, ROM of core, proximal hip and LE for functional self-care, mobility, lifting and ambulation/stair navigation   [x] (68043)Reviewed/Progressed HEP activities related to improving balance, coordination, kinesthetic sense, posture, motor skill, proprioception of core, proximal hip and LE for self care, mobility, lifting, and ambulation/stair navigation      Manual Treatments:  PROM / STM / Oscillations-Mobs:  G-I, II, III, IV (PA's, Inf., Post.)  [x] (32848) Provided manual therapy to mobilize LE, proximal hip and/or LS spine soft tissue/joints for the purpose of modulating pain, promoting relaxation,  increasing ROM, reducing/eliminating soft tissue swelling/inflammation/restriction, improving soft tissue extensibility and allowing for proper ROM for normal function with self care, mobility, lifting and ambulation. Modalities:   Deferred. 12/13    Charges:  Timed Code Treatment Minutes: 50   Total Treatment Minutes: 50       [] EVAL (LOW) 91276 (typically 20 minutes face-to-face)   [] EVAL (MOD) 42067 (typically 30 minutes face-to-face)  [] EVAL (HIGH) 66925 (typically 45 minutes face-to-face)  [] RE-EVAL     [x] LB(38810) x 1    [] IONTO (47304)  [x] NMR (82832) x 1   [] VASO (77564)  [] Manual (20206) x     [] Other:  [x] TA (12281)x 1   [] Mech Traction (07228)  [] ES(attended) (02785)     [] ES (un) (76401):     GOALS:  Patient stated goal: Be able to do normal activities. [x] Progressing: [] Met: [] Not Met: [] Adjusted    Therapist goals for Patient:   Short Term Goals: To be achieved in: 2 weeks  1. Independent in HEP and progression per patient tolerance, in order to prevent re-injury. [] Progressing: [x] Met: [] Not Met: [] Adjusted  2. Patient will have a decrease in pain to facilitate improvement in movement, function, and ADLs as indicated by Functional Deficits. [] Progressing: [x] Met: [] Not Met: [] Adjusted     Long Term Goals: To be achieved in: 8 weeks  1. Disability index score of 20% or less for the LEFS to assist with reaching prior level of function.    [x] Progressing: [] Met: [] Not Met: [] Adjusted Comment:  64% disability on LEFS as of 11/22/21  2. Patient will demonstrate increased AROM to 0-120 to allow for proper joint functioning as indicated by patients Functional Deficits. [] Progressing: [x] Met: [] Not Met: [] Adjusted  Comment:  0-125  3. Patient will demonstrate an increase in strength to good proximal hip strength and control, within 5lb HHD in LE to allow for proper functional mobility as indicated by patients Functional Deficits. [x] Progressing: [] Met: [] Not Met: [] Adjusted Comment:  No quad lag with SLR. Able to perform LAQ at EOB 0-90 actively pain free. Overall, strength activities progressing. Still having functional difficulties secondary to eccentric quad strength deficits including difficulty getting in/out of a chair and navigating up/down stairs. 4. Patient will demonstrate normal gait mechanics without increased symptoms or restriction to allow him to walk and perform all daily mobility. [] Progressing: [x] Met: [] Not Met: [] Adjusted Comment:  Using R knee post-op brace and SPC for community ambulation only for safety reasons. Gait mechanics without AD and knee brace much improved. 5. Patient will be able to navigate stairs up/down with reciprocal gait mechanics without increased symptoms or restriction to allow him normal household mobility without restricted access to certain parts of his home. [x] Progressing: [] Met: [] Not Met: [] Adjusted           Progression Towards Functional goals:  [x] Patient is progressing as expected towards functional goals listed. [] Progression is slowed due to complexities listed. [] Progression has been slowed due to co-morbidities. [] Plan just implemented, too soon to assess goals progression  [] Other:     ASSESSMENT:  Able to progress BFR to 80% LOP today to continue to advance R quad strengthening with low loads.   Plan on practicing use of RW with full weight through [address] alicia [place] on Wednesday to improve safety in preparation for L knee surgery Friday (12/17). Want to make sure patient is going to tolerate full weight bearing on recently repaired R LE without aggravating R knee and causing damage or pain to recovering R knee. Return to Play: (if applicable)   []  Stage 1: Intro to Strength   []  Stage 2: Return to Run and Strength   []  Stage 3: Return to Jump and Strength   []  Stage 4: Dynamic Strength and Agility   []  Stage 5: Sport Specific Training     []  Ready to Return to Play, Meets All Above Stages   []  Not Ready for Return to Sports   Comments:            Treatment/Activity Tolerance:  [x] Patient tolerated treatment well [] Patient limited by fatique  [] Patient limited by pain  [] Patient limited by other medical complications  [] Other:     Overall Progression Towards Functional goals/ Treatment Progress Update:  [x] Patient is progressing as expected towards functional goals listed. [] Progression is slowed due to complexities/Impairments listed. [] Progression has been slowed due to co-morbidities. [] Plan just implemented, too soon to assess goals progression <30days   [] Goals require adjustment due to lack of progress  [] Patient is not progressing as expected and requires additional follow up with physician  [] Other    Prognosis for POC: [x] Good [] Fair  [] Poor    Patient requires continued skilled intervention: [x] Yes  [] No        PLAN: Progress weight bearing CKC strengthening as sandie. Progress BFR as sandie. [x] Continue per plan of care [] Alter current plan (see comments)  [] Plan of care initiated [] Hold pending MD visit [] Discharge    Electronically signed by: Xiomara Bowen, PT, DPT, MS, SCS    Note: If patient does not return for scheduled/recommended follow up visits, this note will serve as a discharge from care along with the most recent update on progress.

## 2021-12-14 NOTE — PROGRESS NOTES
Name_______________________________________Printed:____________________  Date and time of surgery___12/17 1230_____________________Arrival Time:__1030_____________   1. The instructions given regarding when and if a patient needs to stop oral intake prior to surgery varies. Follow the specific instructions you were given                  _x__Nothing to eat or to drink after Midnight the night before.                   ____Carbo loading or ERAS instructions will be given to select patients-if you have been given those instructions -please do the following                           The evening before your surgery after dinner before midnight drink 40 ounces of gatorade. If you are diabetic use sugar free. The morning of surgery drink 40 ounces of water. This needs to be finished 3 hours prior to your surgery start time. 2. Take the following pills with a small sip of water on the morning of surgery___norvasc________________________________________________                  Do not take blood pressure medications ending in pril or sartan the mehdi prior to surgery or the morning of surgery_   3. Aspirin, Ibuprofen, Advil, Naproxen, Vitamin E and other Anti-inflammatory products and supplements should be stopped for 5 -7days before surgery or as directed by your physician. 4. Check with your Doctor regarding stopping Plavix, Coumadin,Eliquis, Lovenox,Effient,Pradaxa,Xarelto, Fragmin or other blood thinners and follow their instructions. 5. Do not smoke, and do not drink any alcoholic beverages 24 hours prior to surgery. This includes NA Beer. Refrain from the usage of any recreational drugs. 6. You may brush your teeth and gargle the morning of surgery. DO NOT SWALLOW WATER   7. You MUST make arrangements for a responsible adult to stay on site while you are here and take you home after your surgery. You will not be allowed to leave alone or drive yourself home.   It is strongly suggested someone stay with you the information:  [email or web address]              20.During flu season no children under the age of 15 are permitted in the hospital for the safety of all patients. 21. If you take a long acting insulin in the evening only  take half of your usual  dose the night  before your procedure              22. If you use a c-pap please bring DOS if staying overnight,             23.For your convenience [facility] has a pharmacy on site to fill your prescriptions. 24. If you use oxygen and have a portable tank please bring it  with you the DOS             25. Bring a complete list of all your medications with name and dose include any supplements. 26. Other__________________________________________   *Please call pre admission testing if you any further questions   Elvie Kenny   [address] [place] [address]. [place]  [phone]   [facility] [address] CINDA FIERRO   [phone]           COVID TESTING    _x__ Covid test to be done 3-5 days prior to scheduled surgery -patient aware they are REQUIRED to bring a copy of the negative result DOS-if they receive a positive result to notify their surgeon         If known - indicate where patient is getting covid test done __12/13 at pcp knows to bring result_________________________________________________________    ___ Rapid - DOS    ___ Other___________________________________      Patti Akins POLICY(subject to change)    There is a one visitor policy at [facility] for all surgeries and endoscopies. Whether the visitor can stay or will be asked to wait in the car will depend on the current policy and if social distancing can be maintained. The policy is subject to change at any time. Please make sure the visitor has a cell phone that is on,charged and able to accept calls, as this may be the way that the staff communicates with them. Pain management is NO VISITOR policyThe patients ride is expected to remain in the car with a cell phone for communication. If the ride is leaving the hospital grounds please make sure they are back in time for pickup. Have the patient inform the staff on arrival what their rides plans are while the patient is in the facility. At the MAIN there is one visitor allowed. Please note that the visitor policy is subject to change. All above information reviewed with patient in person or by phone. Patient verbalizes understanding. All questions and concerns addressed.                                                                                                  Patient/Rep____________________                                                                                                                 Per phone/pt                   PRE OP INSTRUCTIONS

## 2021-12-15 ENCOUNTER — TELEPHONE (OUTPATIENT)
Dept: ORTHOPEDIC SURGERY | Age: 61
End: 2021-12-15

## 2021-12-15 ENCOUNTER — CLINICAL DOCUMENTATION (OUTPATIENT)
Dept: ORTHOPEDIC SURGERY | Age: 61
End: 2021-12-15

## 2021-12-15 ENCOUNTER — HOSPITAL ENCOUNTER (OUTPATIENT)
Dept: PHYSICAL THERAPY | Age: 61
Setting detail: THERAPIES SERIES
Discharge: HOME | End: 2021-12-15
Payer: COMMERCIAL

## 2021-12-15 DIAGNOSIS — G89.18 POST-OP PAIN: Primary | ICD-10-CM

## 2021-12-15 PROCEDURE — 97110 THERAPEUTIC EXERCISES: CPT

## 2021-12-15 PROCEDURE — 97530 THERAPEUTIC ACTIVITIES: CPT

## 2021-12-15 PROCEDURE — 97112 NEUROMUSCULAR REEDUCATION: CPT

## 2021-12-15 RX ORDER — ONDANSETRON 4 MG/1
4 TABLET, FILM COATED ORAL EVERY 8 HOURS PRN
Qty: 21 TABLET | Refills: 0 | Status: SHIPPED | OUTPATIENT
Start: 2021-12-15 | End: 2021-12-22

## 2021-12-15 RX ORDER — HYDROCODONE BITARTRATE AND ACETAMINOPHEN 5; 325 MG/1; MG/1
1 TABLET ORAL EVERY 4 HOURS PRN
Qty: 30 TABLET | Refills: 0 | Status: SHIPPED | OUTPATIENT
Start: 2021-12-15 | End: 2021-12-22

## 2021-12-15 NOTE — FLOWSHEET NOTE
[facility]    Physical Therapy Treatment Note/ Progress Report:     Date:  12/15/2021    Patient Name:  Gisela Medina    :  1960  MRN: [id number]  Medical/Treatment Diagnosis Information:  · Diagnosis: M23.306 Other meniscus derangements, unspecified meniscus, R knee; M25.561 R knee pain  · Treatment Diagnosis: M23.306 Other meniscus derangements, unspecified meniscus, R knee; M25.561 R knee pain  Insurance/Certification information:  PT Insurance Information: [facility]  Physician Information:  Referring Practitioner: Gwendolyn Taylor  Plan of care signed (Y/N):     Date of Patient follow up with Physician:      Progress Report: []  Yes  [x]  No     Functional Scale: 64% disability - LEFS ()   Date: 21    Date Range for reporting period:  Beginnin21  Endin days or 10 visits    Progress report due (10 Rx/or 30 days whichever is less):      Recertification due (POC duration/ or 90 days whichever is less): 21     Visit # Insurance Allowable Auth Needed   [facility] []Yes    [x]No     Pain level:  0/10 at start of session; 1-2/10 at worst.      SUBJECTIVE:    L knee meniscal repair surgery scheduled for this Friday. OBJECTIVE: 21   Observation:    Test measurements:    HIP Abd      HIP Ext       HIP IR       HIP ER       Knee ext 0 0   Knee Flex 125 114 Tight. p! Ankle PF       Ankle DF       Ankle In       Ankle Ev       Strength  LEFT RIGHT   HIP Flexors  4-/5 4-/5    HIP Abductors 4-/5 4-/5   HIP Ext       Hip ER       Knee EXT (quad) 3+/5 p! 4-/5   No quad lag present with SLR. Able to move actively through LAQ ROM from 0-90 pain free at EOB.    Knee Flex (HS) 4-/5 p! 3/5   Ankle DF       Ankle PF       Ankle Inv       Ankle EV               Circumference  Mid apex  7 cm prox        44.0 cm  50.0 cm     45.5 cm  50.5 cm     Gait:  Patient ambulating with SPC on the L for safety only; normal R knee flexion during swing phase; initial contact with heel and rocking onto toes appropriately during stance phase on the R; no longer wearing R knee post-op brace or using SPC for household ambulation; still using both brace and SPC for community ambulation just for safety reasons. RESTRICTIONS/PRECAUTIONS: High BP. Previous tobacco use (5 per day). R knee scope, meniscus repair on 9/17/21. NWBing on R LE for 6 weeks. Exercises/Interventions:     Therapeutic Exercise Resistance Sets/sec Reps Notes   Recumbent bike   5 min    Gastroc stretch on SB  30s 5    Hamstring stretch Long sitting 30s 5    Ankle pumps HEP   Heelslides w/strap Supine 10s 10    Quad sets    SAQ over bolster    LAQ @EOB 0-90; 3# ankle weight 2 10 Performed with BFR. SLR flexion 3# 2 10 Performed with BFR. SLR abduction 3# 2 10 Performed with BFR. SLR adduction 2# 2 10    [email or web address] LOP (184 mmHg) Blue cuff  8 min 30-15-15-15  SLR/sidelying hip abd/LAQ/standing hamstring curls           Leg press - 2 up SL down 60# 2 10                           Therapeutic Activities       Step ups/downs on 8\"  2 10 Cues to drive through heel, achieve TKE on R LE, slow control down during eccentric phase   TRX squat taps to chair Elevated x1 foam 2 10    Gait training w/RW - NWBing on L LE   10 min Worked on coordination of RW and R LE to maintain NWBIng on L LE following surgery coming up this Friday (12/17)                 Manual Intervention       R patella mobs - inferior, medial/lateral    Gr. I-II   R knee PROM - EOB, supine                                   NMR Re-education       [employer] - maze tracing - easy, medium, and difficult skill levels lvl 8  4x    CC TKE Resistance 4 5s 20    Mod. BOSU lunge isos  10s 10    Sport cord march (blue)  2 10 Each position; side/side   SLS balance on airex  30s 5    Reverse lunge w/slider  2 10             Access Code: [email or web address]  URL: [email or web address]  Date: 10/06/2021  Prepared by: Julissa Murphy Catia    Exercises  Sidelying Hip Abduction - 1-2 x daily - 7 x weekly - 2-3 sets - 10 reps  Sidelying Hip Adduction - 1-2 x daily - 7 x weekly - 2-3 sets - 10 reps  *In addition to pre-op exercises including hamstring stretch, gastroc stretch, heelslides, quad sets, SLR flexion. Spoke with Dr. Dayanara Robledo regarding the use of BFR with patient. Bloodflow restriction was utilized throughout exercise session with use of Emi Unit for a period of 8 minutes at  80% Occlusion. Patient's total limp occlusion pressure was calculated and [facility] for the entire time of occlusion. Therapeutic Exercise and NMR EXR  [x] (03337) Provided verbal/tactile cueing for activities related to strengthening, flexibility, endurance, ROM for improvements in LE, proximal hip, and core control with self care, mobility, lifting, ambulation. [x] (66651) Provided verbal/tactile cueing for activities related to improving balance, coordination, kinesthetic sense, posture, motor skill, proprioception  to assist with LE, proximal hip, and core control in self care, mobility, lifting, ambulation and eccentric single leg control.      NMR and Therapeutic Activities:    [x] (22580 or 33161) Provided verbal/tactile cueing for activities related to improving balance, coordination, kinesthetic sense, posture, motor skill, proprioception and motor activation to allow for proper function of core, proximal hip and LE with self care and ADLs  [x] (47733) Gait Re-education- Provided training and instruction to the patient for proper LE, core and proximal hip recruitment and positioning and eccentric body weight control with ambulation re-education including up and down stairs     Home Exercise Program:    [x] (11003) Reviewed/Progressed HEP activities related to strengthening, flexibility, endurance, ROM of core, proximal hip and LE for functional self-care, mobility, lifting and ambulation/stair navigation   [x] (08027)Reviewed/Progressed HEP activities related to improving balance, coordination, kinesthetic sense, posture, motor skill, proprioception of core, proximal hip and LE for self care, mobility, lifting, and ambulation/stair navigation      Manual Treatments:  PROM / STM / Oscillations-Mobs:  G-I, II, III, IV (PA's, Inf., Post.)  [x] (60146) Provided manual therapy to mobilize LE, proximal hip and/or LS spine soft tissue/joints for the purpose of modulating pain, promoting relaxation,  increasing ROM, reducing/eliminating soft tissue swelling/inflammation/restriction, improving soft tissue extensibility and allowing for proper ROM for normal function with self care, mobility, lifting and ambulation. Modalities:   Deferred. 12/15    Charges:  Timed Code Treatment Minutes: 50   Total Treatment Minutes: 50       [] EVAL (LOW) 02704 (typically 20 minutes face-to-face)   [] EVAL (MOD) 35074 (typically 30 minutes face-to-face)  [] EVAL (HIGH) 62822 (typically 45 minutes face-to-face)  [] RE-EVAL     [x] ST(71890) x 1    [] IONTO (12670)  [x] NMR (11765) x 1   [] VASO (28697)  [] Manual (33271) x     [] Other:  [x] TA (65292)x 1   [] Mech Traction (06287)  [] ES(attended) (47701)     [] ES (un) (25611):     GOALS:  Patient stated goal: Be able to do normal activities. [x] Progressing: [] Met: [] Not Met: [] Adjusted    Therapist goals for Patient:   Short Term Goals: To be achieved in: 2 weeks  1. Independent in HEP and progression per patient tolerance, in order to prevent re-injury. [] Progressing: [x] Met: [] Not Met: [] Adjusted  2. Patient will have a decrease in pain to facilitate improvement in movement, function, and ADLs as indicated by Functional Deficits. [] Progressing: [x] Met: [] Not Met: [] Adjusted     Long Term Goals: To be achieved in: 8 weeks  1. Disability index score of 20% or less for the LEFS to assist with reaching prior level of function.    [x] Progressing: [] Met: [] Not Met: [] Adjusted Comment:  64% disability on LEFS as of 11/22/21  2. Patient will demonstrate increased AROM to 0-120 to allow for proper joint functioning as indicated by patients Functional Deficits. [] Progressing: [x] Met: [] Not Met: [] Adjusted  Comment:  0-125  3. Patient will demonstrate an increase in strength to good proximal hip strength and control, within 5lb HHD in LE to allow for proper functional mobility as indicated by patients Functional Deficits. [x] Progressing: [] Met: [] Not Met: [] Adjusted Comment:  No quad lag with SLR. Able to perform LAQ at EOB 0-90 actively pain free. Overall, strength activities progressing. Still having functional difficulties secondary to eccentric quad strength deficits including difficulty getting in/out of a chair and navigating up/down stairs. 4. Patient will demonstrate normal gait mechanics without increased symptoms or restriction to allow him to walk and perform all daily mobility. [] Progressing: [x] Met: [] Not Met: [] Adjusted Comment:  Using R knee post-op brace and SPC for community ambulation only for safety reasons. Gait mechanics without AD and knee brace much improved. 5. Patient will be able to navigate stairs up/down with reciprocal gait mechanics without increased symptoms or restriction to allow him normal household mobility without restricted access to certain parts of his home. [x] Progressing: [] Met: [] Not Met: [] Adjusted           Progression Towards Functional goals:  [x] Patient is progressing as expected towards functional goals listed. [] Progression is slowed due to complexities listed. [] Progression has been slowed due to co-morbidities. [] Plan just implemented, too soon to assess goals progression  [] Other:     ASSESSMENT:  Practiced use of RW with full weight through R LE maintaining [address] on L LE today to improve safety in preparation for L knee surgery this Friday (12/17).  Will [facility]-eval and [facility] at [facility] following L knee meniscus repair. Return to Play: (if applicable)   []  Stage 1: Intro to Strength   []  Stage 2: Return to Run and Strength   []  Stage 3: Return to Jump and Strength   []  Stage 4: Dynamic Strength and Agility   []  Stage 5: Sport Specific Training     []  Ready to Return to Play, Meets All Above Stages   []  Not Ready for Return to Sports   Comments:            Treatment/Activity Tolerance:  [x] Patient tolerated treatment well [] Patient limited by fatique  [] Patient limited by pain  [] Patient limited by other medical complications  [] Other:     Overall Progression Towards Functional goals/ Treatment Progress Update:  [x] Patient is progressing as expected towards functional goals listed. [] Progression is slowed due to complexities/Impairments listed. [] Progression has been slowed due to co-morbidities. [] Plan just implemented, too soon to assess goals progression <30days   [] Goals require adjustment due to lack of progress  [] Patient is not progressing as expected and requires additional follow up with physician  [] Other    Prognosis for POC: [x] Good [] Fair  [] Poor    Patient requires continued skilled intervention: [x] Yes  [] No        PLAN: Progress weight bearing CKC strengthening as sandie. Progress BFR as sandie. [x] Continue per plan of care [] Alter current plan (see comments)  [] Plan of care initiated [] Hold pending MD visit [] Discharge    Electronically signed by: Moni Starr, PT, DPT, MS, SCS    Note: If patient does not return for scheduled/recommended follow up visits, this note will serve as a discharge from care along with the most recent update on progress.

## 2021-12-16 ENCOUNTER — TELEPHONE (OUTPATIENT)
Dept: ORTHOPEDIC SURGERY | Age: 61
End: 2021-12-16

## 2021-12-16 DIAGNOSIS — G89.18 POST-OP PAIN: Primary | ICD-10-CM

## 2021-12-16 RX ORDER — RIVAROXABAN 2.5 MG/1
2.5 TABLET, FILM COATED ORAL 2 TIMES DAILY
Qty: 60 TABLET | Refills: 0 | Status: ON HOLD | OUTPATIENT
Start: 2021-12-16 | End: 2021-12-17

## 2021-12-17 ENCOUNTER — HOSPITAL ENCOUNTER (OUTPATIENT)
Age: 61
Setting detail: OUTPATIENT SURGERY
Discharge: HOME | End: 2021-12-17
Attending: ORTHOPAEDIC SURGERY | Admitting: ORTHOPAEDIC SURGERY
Payer: COMMERCIAL

## 2021-12-17 ENCOUNTER — ANESTHESIA EVENT (OUTPATIENT)
Dept: OPERATING ROOM | Age: 61
End: 2021-12-17
Payer: COMMERCIAL

## 2021-12-17 ENCOUNTER — ANESTHESIA (OUTPATIENT)
Dept: OPERATING ROOM | Age: 61
End: 2021-12-17
Payer: COMMERCIAL

## 2021-12-17 VITALS
HEIGHT: 69 IN | WEIGHT: 247.4 LBS | DIASTOLIC BLOOD PRESSURE: 97 MMHG | HEART RATE: 102 BPM | OXYGEN SATURATION: 97 % | TEMPERATURE: 97.6 F | BODY MASS INDEX: 36.64 KG/M2 | SYSTOLIC BLOOD PRESSURE: 130 MMHG | RESPIRATION RATE: 13 BRPM

## 2021-12-17 VITALS
OXYGEN SATURATION: 100 % | RESPIRATION RATE: 3 BRPM | DIASTOLIC BLOOD PRESSURE: 79 MMHG | SYSTOLIC BLOOD PRESSURE: 136 MMHG

## 2021-12-17 PROCEDURE — 7100000000 HC PACU RECOVERY - FIRST 15 MIN: Performed by: ORTHOPAEDIC SURGERY

## 2021-12-17 PROCEDURE — 2500000003 HC RX 250 WO HCPCS: Performed by: NURSE ANESTHETIST, CERTIFIED REGISTERED

## 2021-12-17 PROCEDURE — 2709999900 HC NON-CHARGEABLE SUPPLY: Performed by: ORTHOPAEDIC SURGERY

## 2021-12-17 PROCEDURE — 7100000010 HC PHASE II RECOVERY - FIRST 15 MIN: Performed by: ORTHOPAEDIC SURGERY

## 2021-12-17 PROCEDURE — 2580000003 HC RX 258: Performed by: ORTHOPAEDIC SURGERY

## 2021-12-17 PROCEDURE — 6360000002 HC RX W HCPCS: Performed by: FAMILY MEDICINE

## 2021-12-17 PROCEDURE — 6360000002 HC RX W HCPCS: Performed by: ORTHOPAEDIC SURGERY

## 2021-12-17 PROCEDURE — 3700000001 HC ADD 15 MINUTES (ANESTHESIA): Performed by: ORTHOPAEDIC SURGERY

## 2021-12-17 PROCEDURE — 6360000002 HC RX W HCPCS

## 2021-12-17 PROCEDURE — 3700000000 HC ANESTHESIA ATTENDED CARE: Performed by: ORTHOPAEDIC SURGERY

## 2021-12-17 PROCEDURE — 6370000000 HC RX 637 (ALT 250 FOR IP): Performed by: FAMILY MEDICINE

## 2021-12-17 PROCEDURE — 2720000010 HC SURG SUPPLY STERILE: Performed by: ORTHOPAEDIC SURGERY

## 2021-12-17 PROCEDURE — 3600000014 HC SURGERY LEVEL 4 ADDTL 15MIN: Performed by: ORTHOPAEDIC SURGERY

## 2021-12-17 PROCEDURE — 7100000011 HC PHASE II RECOVERY - ADDTL 15 MIN: Performed by: ORTHOPAEDIC SURGERY

## 2021-12-17 PROCEDURE — C1713 ANCHOR/SCREW BN/BN,TIS/BN: HCPCS | Performed by: ORTHOPAEDIC SURGERY

## 2021-12-17 PROCEDURE — 6360000002 HC RX W HCPCS: Performed by: NURSE ANESTHETIST, CERTIFIED REGISTERED

## 2021-12-17 PROCEDURE — 7100000001 HC PACU RECOVERY - ADDTL 15 MIN: Performed by: ORTHOPAEDIC SURGERY

## 2021-12-17 PROCEDURE — 3600000004 HC SURGERY LEVEL 4 BASE: Performed by: ORTHOPAEDIC SURGERY

## 2021-12-17 PROCEDURE — 2500000003 HC RX 250 WO HCPCS: Performed by: ORTHOPAEDIC SURGERY

## 2021-12-17 DEVICE — BUTTON SUT DIA12MM TI FOR PRI BKUP FIBERWIRE FIX OF ACL PCL: Type: IMPLANTABLE DEVICE | Site: KNEE | Status: FUNCTIONAL

## 2021-12-17 RX ORDER — OXYCODONE HYDROCHLORIDE 5 MG/1
5 TABLET ORAL PRN
Status: COMPLETED | OUTPATIENT
Start: 2021-12-17 | End: 2021-12-17

## 2021-12-17 RX ORDER — FENTANYL CITRATE 50 UG/ML
50 INJECTION, SOLUTION INTRAMUSCULAR; INTRAVENOUS EVERY 5 MIN PRN
Status: DISCONTINUED | OUTPATIENT
Start: 2021-12-17 | End: 2021-12-17 | Stop reason: HOSPADM

## 2021-12-17 RX ORDER — HYDROMORPHONE HCL 110MG/55ML
0.25 PATIENT CONTROLLED ANALGESIA SYRINGE INTRAVENOUS EVERY 5 MIN PRN
Status: DISCONTINUED | OUTPATIENT
Start: 2021-12-17 | End: 2021-12-17 | Stop reason: HOSPADM

## 2021-12-17 RX ORDER — LABETALOL HYDROCHLORIDE 5 MG/ML
5 INJECTION, SOLUTION INTRAVENOUS EVERY 10 MIN PRN
Status: DISCONTINUED | OUTPATIENT
Start: 2021-12-17 | End: 2021-12-17 | Stop reason: HOSPADM

## 2021-12-17 RX ORDER — SODIUM CHLORIDE, SODIUM LACTATE, POTASSIUM CHLORIDE, CALCIUM CHLORIDE 600; 310; 30; 20 MG/100ML; MG/100ML; MG/100ML; MG/100ML
INJECTION, SOLUTION INTRAVENOUS CONTINUOUS
Status: DISCONTINUED | OUTPATIENT
Start: 2021-12-17 | End: 2021-12-17 | Stop reason: HOSPADM

## 2021-12-17 RX ORDER — HYDROMORPHONE HCL 110MG/55ML
0.5 PATIENT CONTROLLED ANALGESIA SYRINGE INTRAVENOUS EVERY 5 MIN PRN
Status: DISCONTINUED | OUTPATIENT
Start: 2021-12-17 | End: 2021-12-17 | Stop reason: HOSPADM

## 2021-12-17 RX ORDER — MEPERIDINE HYDROCHLORIDE 25 MG/ML
12.5 INJECTION INTRAMUSCULAR; INTRAVENOUS; SUBCUTANEOUS EVERY 5 MIN PRN
Status: DISCONTINUED | OUTPATIENT
Start: 2021-12-17 | End: 2021-12-17 | Stop reason: HOSPADM

## 2021-12-17 RX ORDER — PROMETHAZINE HYDROCHLORIDE 25 MG/ML
6.25 INJECTION, SOLUTION INTRAMUSCULAR; INTRAVENOUS PRN
Status: DISCONTINUED | OUTPATIENT
Start: 2021-12-17 | End: 2021-12-17 | Stop reason: HOSPADM

## 2021-12-17 RX ORDER — BUPIVACAINE HYDROCHLORIDE 5 MG/ML
INJECTION, SOLUTION EPIDURAL; INTRACAUDAL
Status: COMPLETED | OUTPATIENT
Start: 2021-12-17 | End: 2021-12-17

## 2021-12-17 RX ORDER — LIDOCAINE HYDROCHLORIDE 10 MG/ML
INJECTION, SOLUTION INFILTRATION; PERINEURAL PRN
Status: DISCONTINUED | OUTPATIENT
Start: 2021-12-17 | End: 2021-12-17 | Stop reason: SDUPTHER

## 2021-12-17 RX ORDER — DEXAMETHASONE SODIUM PHOSPHATE 4 MG/ML
INJECTION, SOLUTION INTRA-ARTICULAR; INTRALESIONAL; INTRAMUSCULAR; INTRAVENOUS; SOFT TISSUE PRN
Status: DISCONTINUED | OUTPATIENT
Start: 2021-12-17 | End: 2021-12-17 | Stop reason: SDUPTHER

## 2021-12-17 RX ORDER — OXYCODONE HYDROCHLORIDE 5 MG/1
10 TABLET ORAL PRN
Status: COMPLETED | OUTPATIENT
Start: 2021-12-17 | End: 2021-12-17

## 2021-12-17 RX ORDER — PROPOFOL 10 MG/ML
INJECTION, EMULSION INTRAVENOUS PRN
Status: DISCONTINUED | OUTPATIENT
Start: 2021-12-17 | End: 2021-12-17 | Stop reason: SDUPTHER

## 2021-12-17 RX ORDER — DIPHENHYDRAMINE HYDROCHLORIDE 50 MG/ML
12.5 INJECTION INTRAMUSCULAR; INTRAVENOUS
Status: DISCONTINUED | OUTPATIENT
Start: 2021-12-17 | End: 2021-12-17 | Stop reason: HOSPADM

## 2021-12-17 RX ORDER — ONDANSETRON 2 MG/ML
INJECTION INTRAMUSCULAR; INTRAVENOUS PRN
Status: DISCONTINUED | OUTPATIENT
Start: 2021-12-17 | End: 2021-12-17 | Stop reason: SDUPTHER

## 2021-12-17 RX ORDER — FENTANYL CITRATE 50 UG/ML
INJECTION, SOLUTION INTRAMUSCULAR; INTRAVENOUS
Status: COMPLETED
Start: 2021-12-17 | End: 2021-12-17

## 2021-12-17 RX ORDER — FENTANYL CITRATE 50 UG/ML
INJECTION, SOLUTION INTRAMUSCULAR; INTRAVENOUS PRN
Status: DISCONTINUED | OUTPATIENT
Start: 2021-12-17 | End: 2021-12-17 | Stop reason: SDUPTHER

## 2021-12-17 RX ORDER — LIDOCAINE HYDROCHLORIDE 10 MG/ML
0.5 INJECTION, SOLUTION EPIDURAL; INFILTRATION; INTRACAUDAL; PERINEURAL ONCE
Status: DISCONTINUED | OUTPATIENT
Start: 2021-12-17 | End: 2021-12-17 | Stop reason: HOSPADM

## 2021-12-17 RX ADMIN — FENTANYL CITRATE 50 MCG: 50 INJECTION, SOLUTION INTRAMUSCULAR; INTRAVENOUS at 14:04

## 2021-12-17 RX ADMIN — OXYCODONE 10 MG: 5 TABLET ORAL at 14:34

## 2021-12-17 RX ADMIN — PROPOFOL 200 MG: 10 INJECTION, EMULSION INTRAVENOUS at 12:24

## 2021-12-17 RX ADMIN — DEXAMETHASONE SODIUM PHOSPHATE 8 MG: 4 INJECTION, SOLUTION INTRAMUSCULAR; INTRAVENOUS at 12:39

## 2021-12-17 RX ADMIN — HYDROMORPHONE HYDROCHLORIDE 0.5 MG: 2 INJECTION, SOLUTION INTRAMUSCULAR; INTRAVENOUS; SUBCUTANEOUS at 14:00

## 2021-12-17 RX ADMIN — FENTANYL CITRATE 50 MCG: 50 INJECTION, SOLUTION INTRAMUSCULAR; INTRAVENOUS at 14:20

## 2021-12-17 RX ADMIN — ONDANSETRON 4 MG: 2 INJECTION INTRAMUSCULAR; INTRAVENOUS at 12:39

## 2021-12-17 RX ADMIN — FENTANYL CITRATE 50 MCG: 50 INJECTION, SOLUTION INTRAMUSCULAR; INTRAVENOUS at 12:20

## 2021-12-17 RX ADMIN — SODIUM CHLORIDE, POTASSIUM CHLORIDE, SODIUM LACTATE AND CALCIUM CHLORIDE: 600; 310; 30; 20 INJECTION, SOLUTION INTRAVENOUS at 11:34

## 2021-12-17 RX ADMIN — CEFAZOLIN SODIUM 2000 MG: 10 INJECTION, POWDER, FOR SOLUTION INTRAVENOUS at 12:18

## 2021-12-17 RX ADMIN — HYDROMORPHONE HYDROCHLORIDE 0.5 MG: 2 INJECTION, SOLUTION INTRAMUSCULAR; INTRAVENOUS; SUBCUTANEOUS at 14:16

## 2021-12-17 RX ADMIN — LIDOCAINE HYDROCHLORIDE 100 MG: 10 INJECTION, SOLUTION INFILTRATION; PERINEURAL at 12:20

## 2021-12-17 RX ADMIN — HYDROMORPHONE HYDROCHLORIDE 0.5 MG: 2 INJECTION, SOLUTION INTRAMUSCULAR; INTRAVENOUS; SUBCUTANEOUS at 14:10

## 2021-12-17 RX ADMIN — FENTANYL CITRATE 50 MCG: 50 INJECTION, SOLUTION INTRAMUSCULAR; INTRAVENOUS at 12:18

## 2021-12-17 ASSESSMENT — PULMONARY FUNCTION TESTS
PIF_VALUE: 4
PIF_VALUE: 4
PIF_VALUE: 19
PIF_VALUE: 4
PIF_VALUE: 5
PIF_VALUE: 5
PIF_VALUE: 4
PIF_VALUE: 19
PIF_VALUE: 5
PIF_VALUE: 3
PIF_VALUE: 0
PIF_VALUE: 4
PIF_VALUE: 5
PIF_VALUE: 5
PIF_VALUE: 4
PIF_VALUE: 4
PIF_VALUE: 23
PIF_VALUE: 19
PIF_VALUE: 6
PIF_VALUE: 17
PIF_VALUE: 3
PIF_VALUE: 6
PIF_VALUE: 17
PIF_VALUE: 4
PIF_VALUE: 5
PIF_VALUE: 4
PIF_VALUE: 21
PIF_VALUE: 5
PIF_VALUE: 4
PIF_VALUE: 15
PIF_VALUE: 3
PIF_VALUE: 4
PIF_VALUE: 5
PIF_VALUE: 5
PIF_VALUE: 19
PIF_VALUE: 19
PIF_VALUE: 3
PIF_VALUE: 3
PIF_VALUE: 21
PIF_VALUE: 22
PIF_VALUE: 22
PIF_VALUE: 3
PIF_VALUE: 4
PIF_VALUE: 10
PIF_VALUE: 4
PIF_VALUE: 4
PIF_VALUE: 2
PIF_VALUE: 4
PIF_VALUE: 19
PIF_VALUE: 4
PIF_VALUE: 3
PIF_VALUE: 21
PIF_VALUE: 4
PIF_VALUE: 4
PIF_VALUE: 3
PIF_VALUE: 2
PIF_VALUE: 0
PIF_VALUE: 6
PIF_VALUE: 5
PIF_VALUE: 4
PIF_VALUE: 5
PIF_VALUE: 4
PIF_VALUE: 3
PIF_VALUE: 5
PIF_VALUE: 4
PIF_VALUE: 18
PIF_VALUE: 0
PIF_VALUE: 4
PIF_VALUE: 0
PIF_VALUE: 4
PIF_VALUE: 21
PIF_VALUE: 4
PIF_VALUE: 3
PIF_VALUE: 6

## 2021-12-17 ASSESSMENT — PAIN SCALES - GENERAL
PAINLEVEL_OUTOF10: 7
PAINLEVEL_OUTOF10: 8
PAINLEVEL_OUTOF10: 5
PAINLEVEL_OUTOF10: 7
PAINLEVEL_OUTOF10: 8
PAINLEVEL_OUTOF10: 7
PAINLEVEL_OUTOF10: 6

## 2021-12-17 ASSESSMENT — PAIN DESCRIPTION - ORIENTATION: ORIENTATION: LEFT

## 2021-12-17 ASSESSMENT — PAIN - FUNCTIONAL ASSESSMENT
PAIN_FUNCTIONAL_ASSESSMENT: 0-10
PAIN_FUNCTIONAL_ASSESSMENT: PREVENTS OR INTERFERES SOME ACTIVE ACTIVITIES AND ADLS

## 2021-12-17 ASSESSMENT — PAIN DESCRIPTION - DESCRIPTORS: DESCRIPTORS: DISCOMFORT

## 2021-12-17 ASSESSMENT — PAIN DESCRIPTION - FREQUENCY: FREQUENCY: CONTINUOUS

## 2021-12-17 ASSESSMENT — PAIN DESCRIPTION - ONSET: ONSET: ON-GOING

## 2021-12-17 ASSESSMENT — PAIN DESCRIPTION - LOCATION: LOCATION: KNEE

## 2021-12-17 ASSESSMENT — PAIN DESCRIPTION - PAIN TYPE: TYPE: SURGICAL PAIN

## 2021-12-17 NOTE — PROGRESS NOTES
Pt awake and alert at this time. Pt on RA, and VSS. Pt denies nausea, tolerating PO. Pain being managed, see mar. Skin warm LLE, palpable pulses and able to wiggle toes. Pt meets criteria to be discharged from Phase 1.

## 2021-12-17 NOTE — ANESTHESIA PRE PROCEDURE
Current Facility-Administered Medications   Medication Dose Route Frequency Provider Last Rate Last Admin    lactated ringers infusion   IntraVENous Continuous Rain Lezama MD        lidocaine PF 1 % injection 0.5 mL  0.5 mL IntraDERmal Once Rain Lezama MD        ceFAZolin (ANCEF) 2000 mg in dextrose 5 % 50 mL IVPB  2,000 mg IntraVENous On Call to [address] MD        meperidine (DEMEROL) injection 12.5 mg  12.5 mg IntraVENous Q5 Min PRN Elias Noriega MD        HYDROmorphone (DILAUDID) injection 0.25 mg  0.25 mg IntraVENous Q5 Min PRN Elias Noriega MD        fentaNYL (SUBLIMAZE) injection 50 mcg  50 mcg IntraVENous Q5 Min PRN Elias Noriega MD        HYDROmorphone (DILAUDID) injection 0.25 mg  0.25 mg IntraVENous Q5 Min PRN Elias Noriega MD        HYDROmorphone (DILAUDID) injection 0.5 mg  0.5 mg IntraVENous Q5 Min PRN Elias Noriega MD        oxyCODONE (ROXICODONE) immediate release tablet 5 mg  5 mg Oral PRN Elias Noriega MD        Or    oxyCODONE (ROXICODONE) immediate release tablet 10 mg  10 mg Oral PRN Elias Noriega MD        [facility]) injection 6.25 mg  6.25 mg IntraVENous PRN Elias Noriega MD        diphenhydrAMINE (BENADRYL) injection 12.5 mg  12.5 mg IntraVENous Once PRN Elias Noriega MD        labetalol (NORMODYNE;TRANDATE) injection 5 mg  5 mg IntraVENous Q10 Min PRN Elias Noriega MD           Allergies:     Allergies   Allergen Reactions    Molds & Smuts Hives       Problem List:    Patient Active Problem List   Diagnosis Code    Medial epicondylitis M77.00    Right elbow pain M25.521       Past Medical History:        Diagnosis Date    Hypertension     Medial meniscus tear     right    RACHEL (obstructive sleep apnea)     borderline no cpap    Seasonal allergies     Thyroid disease     hypoactive  borderline no meds       Past Surgical History:        Procedure Laterality Date    COLONOSCOPY      DENTAL SURGERY      implants    KNEE ARTHROSCOPY Right 9/17/2021    RIGHT KNEE ARTHROSCOPY MEDIAL MENISCUS ROOT REPAIR performed by Harish Clinton [facility]       Social History:    Social History     Tobacco Use    Smoking status: Former Smoker    Smokeless tobacco: Never Used    Tobacco comment: quit 8-9 years ago/ smoked 5 cig daily   Substance Use Topics    Alcohol use: Yes     Alcohol/week: 2.0 standard drinks     Types: 2 Cans of beer per week     Comment: weekly                                Counseling given: Not Answered  Comment: quit 8-9 years ago/ smoked 5 cig daily      Vital Signs (Current):   Vitals:    12/14/21 1138   Weight: 245 lb (111.1 kg)   Height: 5' 9\" (1.753 m)                                              BP Readings from Last 3 Encounters:   09/17/21 (!) 147/105   09/17/21 112/65   07/20/16 123/70       NPO Status:                                                                                 BMI:   Wt Readings from Last 3 Encounters:   12/14/21 245 lb (111.1 kg)   11/24/21 245 lb (111.1 kg)   10/27/21 245 lb (111.1 kg)     Body mass index is 36.18 kg/m². CBC: No results found for: WBC, RBC, HGB, HCT, MCV, RDW, PLT    CMP: No results found for: NA, K, CL, CO2, BUN, CREATININE, GFRAA, AGRATIO, LABGLOM, GLUCOSE, PROT, CALCIUM, BILITOT, ALKPHOS, AST, ALT    POC Tests: No results for input(s): POCGLU, POCNA, POCK, POCCL, POCBUN, POCHEMO, POCHCT in the last 72 hours.     Coags: No results found for: PROTIME, INR, APTT    HCG (If Applicable): No results found for: PREGTESTUR, PREGSERUM, HCG, HCGQUANT     ABGs: No results found for: PHART, PO2ART, HCW7RWG, LTH7WIU, BEART, V3LPHICT     Type & Screen (If Applicable):  No results found for: LABABO, LABRH    Drug/Infectious Status (If Applicable):  No results found for: HIV, HEPCAB    COVID-19 Screening (If Applicable): No results found for: COVID19        Anesthesia Evaluation    Airway: Mallampati: II  TM distance: >3 FB   Neck ROM: full  Mouth opening: > = 3 FB Dental: Pulmonary:   (+) sleep apnea:                             Cardiovascular:    (+) hypertension:,         Rhythm: regular  Rate: normal                    Neuro/Psych:               GI/Hepatic/Renal:             Endo/Other:                     Abdominal:             Vascular: Other Findings:             Anesthesia Plan      general     ASA 2       Induction: intravenous. Anesthetic plan and risks discussed with patient. Plan discussed with CRNA.                   Mary Estrella MD   12/17/2021

## 2021-12-17 NOTE — ANESTHESIA POSTPROCEDURE EVALUATION
Department of Anesthesiology  Postprocedure Note    Patient: Amalia Gutierrez  MRN: [id number]  YOB: 1960  Date of evaluation: 12/17/2021  Time:  2:07 PM     Procedure Summary     Date: 12/17/21 Room / Location: [address]    Anesthesia Start: 1219 Anesthesia Stop: 8179    Procedure: LEFT KNEE ARTHROSCOPY; MEDIAL MENISCUS ROOT REPAIR (Left Knee) Diagnosis: (C95.689H  LEFT KNEE MEDIAL MENISCUS ROOT TEAR)    Surgeons: Cabrera Roth MD Responsible Provider: Jermaine Moser MD    Anesthesia Type: general ASA Status: 2          Anesthesia Type: general    Stefania Phase I: Stefania Score: 8    Stefania Phase II:      Last vitals: Reviewed and per EMR flowsheets.        Anesthesia Post Evaluation    Patient location during evaluation: PACU  Level of consciousness: awake  Complications: no  Multimodal analgesia pain management approach

## 2021-12-17 NOTE — H&P
Interval History:  Left knee injury sustained on 9/17/2021  Status post right posterior medial meniscus root repair on 9/17/2021     The patient recently underwent a right knee posterior medial meniscus root repair with me on 9/17/2021. Right knee feels quite good and she is bearing weight without difficulty. However she does report that on the night of the surgery on 9/17/2021 she injured her left knee. Resulted in a meniscus root tear. She has been wearing a brace for this. She reports continued pain as well as catching and locking of the left knee. However she is continuing to get rehabilitation following the right knee. That is progressing quite well. Past Medical History:   Diagnosis Date    Hypertension     Medial meniscus tear     right    RACHEL (obstructive sleep apnea)     borderline no cpap    Seasonal allergies     Thyroid disease     hypoactive  borderline no meds       Past Surgical History:   Procedure Laterality Date    COLONOSCOPY      DENTAL SURGERY      implants    KNEE ARTHROSCOPY Right 9/17/2021    RIGHT KNEE ARTHROSCOPY MEDIAL MENISCUS ROOT REPAIR performed by April Cat MD at [place] [address] ARTHROSCOPY Left 12/17/2021    LEFT KNEE ARTHROSCOPY; MEDIAL MENISCUS ROOT REPAIR performed by [facility] at [facility]     Allergies   Allergen Reactions    Molds & Smuts Hives       Scheduled Meds:  Continuous Infusions:  PRN Meds:.        Social Connections:     Frequency of Communication with Friends and Family: Not on file    Frequency of Social Gatherings with Friends and Family: Not on file    Attends Yarsani Services: Not on file    Active Member of Clubs or Organizations: Not on file    Attends Club or Organization Meetings: Not on file    Marital Status: Not on file        Objective:  Ht 5' 9\" (1.753 m)   Wt 245 lb (111.1 kg)   BMI 36.18 kg/m²       Physical Exam:  CV-RRR  Pulm-CTAB    Examination of the left knee   There is a + effusion, no gross deformity or skin changes  Range of motion reveals 0 to 120  neg lachman, negative posterior drawer, no pain or laxity with varus or valgus stress at 0 degrees and 30 degrees of flexion  + medial joint line tenderness  5 out of 5 strength throughout distal muscle groups  Sensation is intact to light touch throughout all distributions  There is no calf swelling or tenderness  Palpable DP pulse, brisk cap refill, 2+ symmetric reflexes     Right knee demonstrates that she is neuro vastly intact with minimal effusion and range of motion of 0 to 130 degrees     Imagin view x-rays of the left knee were obtained in the office today on 2021. There is no fracture or dislocation. No other abnormality. Preserved joint spaces. Part of the left knee was reviewed. There is a radial tear of the root attachment of the posterior medial meniscus. Extrusion of the body. 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     Assessment:  Left knee injury sustained on 2021  Status post right posterior medial meniscus root repair on 2021     Plan:  I had a very long discussion with the patient. We spent time reviewing the imaging findings of the left knee. Unfortunately she has sustained a meniscus root tear. Similar to the other side we discussed the biomechanical consequences of this type of tear. It does put her at increased risk for accelerated degeneration of the knee. However she is currently recovering from a right knee meniscus root repair. We both elected to proceed with left meniscus root repair. Risks were defined as not limited to infection, bleeding, damage to blood vessels or nerves, need for additional surgery.   She does understand elects proceed.

## 2021-12-17 NOTE — OP NOTE
Operative Note      Patient: Lauren Jin  YOB: 1960  MRN: [id number]    Date of Procedure: 12/17/2021    Pre-Op Diagnosis: S80.12A  LEFT KNEE MEDIAL MENISCUS ROOT TEAR    Post-Op Diagnosis: same       Procedure(s):  LEFT KNEE ARTHROSCOPY; MEDIAL MENISCUS ROOT REPAIR    Surgeon(s):  Mare Arauz MD    Assistant:   * No surgical staff found *    Anesthesia: General    Estimated Blood Loss (CY):1UU    Complications:None    Specimens:   * No specimens in log *    Implants:  Implant Name Type Inv. Item Serial No.  Lot No. LRB No. Used Action   BUTTON SUT [id number] TI FOR EULALIO BKUP FIBERWIRE FIX OF ACL PCL  BUTTON SUT [id number] TI FOR EULALIO BKUP FIBERWIRE FIX OF ACL PCL  [employer] [id number] Left 1 Implanted   IMPLANT SYSTEM, MENISCAL ROOT REPAIR WITH BIOCOMPOSITE SWIVEL LOCK    [employer] [id number] Left 1 Implanted         Drains: * No LDAs found *    Findings: Per dictation    Detailed Description of Procedure:    The patient is a 63-year-old female who injured her leftt knee.  At the time she felt a pop within the back of the knee followed by significant swelling and pain.  She was unable to bear weight.  I saw and examined her in the office.  She was neurovascularly intact.  X-rays were normal with preserved joint spaces.  I recommend an MRI.  The MRI did reveal a posterior meniscus root tear medially.  She did appear to have overall intact chondral surfaces with suspected early chondral degeneration.  I discussed with her operative and nonoperative treatment options.  We elected proceed with left knee arthroscopy and posterior medial meniscus root repair.  The risks were defined as but not limited to infection, bleeding, damage to blood vessels or nerves, need for additional surgery.  Please refer to my office note for full details of the discussion.  I thoroughly discussed the patient that to circumstances would preclude us from proceeding with the repair.  1 of which would be grade 1 changes of the lateral region.  At that point I elected to proceed with repair.  Novostitch devices were utilized in order to place locking luggage tag sutures within the posterior horn/root region of the medial meniscus.  Excellent purchase was achieved.  A separate medial portal was created for suture management.  We then elected to use our Arthrex meniscus root guide.  2 cm incision was made over the anterior lateral proximal tibia.  Fascial layer was incised and the musculature was gently elevated off of the proximal tibia.  The guide was set in the desired position based on anatomic landmarks.  Drilling was performed and we elected to ream a 6 mm wide by 5 mm deep tunnel with the Arthrex flip cutter.  Shuttling suture was placed in the suture limbs attached to the meniscus was successfully passed through the tibial tunnel.  The meniscus reduced anatomically.  The suture limbs were tied over a 4-hole metallic Arthrex button under direct visualization to arthroscope ensuring the appropriate tension.  Backup fixation was achieved with Arthrex swivel lock.  Fixation was achieved in 30 degrees of flexion.  The probe was introduced and demonstrated anatomic restoration of the posterior medial meniscus root with strong biomechanical stability.  The arthroscope was taken into the notch ACL and PCL were intact.  Lateral compartment was entered. Jolayne Cancel is no evidence of chondral or meniscus abnormality.  The knee was thoroughly irrigated.  Tourniquet was deflated.  Hemostasis was achieved.  2-0 Vicryl was placed within the fascial layer and a figure-of-eight fashion.  Subtenons layer was closed using 2-0 Vicryl in buried fashion.  Skin was closed using 3-0 nylon.  Sterile dressings were applied in form Xeroform, 4 x 4's, ABD, web roll, and Ace.  Was placed in a hinged knee brace.  Local anesthesia in the form of 30 mL of 0.5% Marcaine combined with epinephrine was injected prior to closure.  The patient was successfully extubated and taken recovery in excellent condition.  At the end of procedure all counts were correct.     Of note, since this was a meniscus root repair requiring transtibial drilling it was much more technically demanding than a standard meniscus repair requiring additional 50 to percent of operative time.     Electronically signed by Matthew Mott MD on 12/17/2021 at 5:36 PM

## 2021-12-17 NOTE — PROGRESS NOTES
Discharge instructions review with patient and  Cory Henao. All home medications have been reviewed, pt v/u. Discharge instructions signed. Pt discharged via wheelchair. Pt discharged with 3 rx from pharmacy and all belongings. [facility] and wheelchair available at home. Reno Armstrong taking stable pt home.

## 2021-12-17 NOTE — PROGRESS NOTES
CLINICAL PHARMACY NOTE: MEDS TO BEDS    Total # of Prescriptions Filled: 3   The following medications were delivered to the patient:  · xarelto 2.5  · Ondansetron 4  · norco 5  ·     Additional Documentation:  Patient spouse signed for 3 scripts at bedside

## 2021-12-20 ENCOUNTER — HOSPITAL ENCOUNTER (OUTPATIENT)
Dept: PHYSICAL THERAPY | Age: 61
Setting detail: THERAPIES SERIES
Discharge: HOME | End: 2021-12-20
Payer: COMMERCIAL

## 2021-12-20 PROCEDURE — 97016 VASOPNEUMATIC DEVICE THERAPY: CPT

## 2021-12-20 PROCEDURE — 97140 MANUAL THERAPY 1/> REGIONS: CPT

## 2021-12-20 PROCEDURE — 97164 PT RE-EVAL EST PLAN CARE: CPT

## 2021-12-20 PROCEDURE — 97112 NEUROMUSCULAR REEDUCATION: CPT

## 2021-12-20 PROCEDURE — 97110 THERAPEUTIC EXERCISES: CPT

## 2021-12-20 NOTE — PLAN OF CARE
Ben,  [facility]      Physical Therapy Re-Certification Plan of Care/MD UPDATE      Dear Dr. Tahir Magana,    We had the pleasure of treating the following patient for physical therapy services at [address] [place] [address]. A summary of our findings can be found in the updated assessment below. This includes our plan of care. If you have any questions or concerns regarding these findings, please do not hesitate to contact me at the office phone number checked above. Thank you for the referral.     Physician Signature:________________________________Date:__________________  By signing above (or electronic signature), therapists plan is approved by physician    Date Range Of Visits: 21 to 21  Total Visits to Date: 34 (1 post-op following L knee scope, meniscus repair, and debridement on 21)  Overall Response to Treatment:   []Patient is responding well to treatment and improvement is noted with regards  to goals   []Patient should continue to improve in reasonable time if they continue HEP   []Patient has plateaued and is no longer responding to skilled PT intervention    []Patient is getting worse and would benefit from return to referring MD   []Patient unable to adhere to initial POC   [x]Other: Patient being re-evaluated following L knee scope, meniscus repair, and debridement on 21.      Physical Therapy Treatment Note/ Progress Report:     Date:  2021    Patient Name:  Amie Rosado    :  1960  MRN: [id number]  Medical/Treatment Diagnosis Information:  · Diagnosis: M23.306 Other meniscus derangements, unspecified meniscus, R knee; M25.561 R knee pain  · Treatment Diagnosis: M23.306 Other meniscus derangements, unspecified meniscus, R knee; M25.561 R knee pain  Insurance/Certification information:  PT Insurance Information: [facility]  Physician Information:  Referring Practitioner: Tahir Magana  Plan of care signed (Y/N):     Date of Patient follow up with Physician: 21     Progress Report: [x]  Yes  []  No     Functional Scale: 64% disability - LEFS ()   Date: 21    Date Range for reporting period:  Beginnin21  Endin days or 10 visits    Progress report due (10 Rx/or 30 days whichever is less):     Recertification due (POC duration/ or 90 days whichever is less): 22     Visit # Insurance Allowable Auth Needed   29  (1 post-op for L knee) [facility] []Yes    [x]No     Pain level:  4/10 at start of session. 0/10 at best. 9/10 at worst.      SUBJECTIVE:    Patient had L knee meniscus repair on 21. Patient also had several sutures removed from the incisions on the R knee while she was under anesthesia for the L knee. Patient NWBing on the L for 6 weeks. Definitely feeling more pain in the L knee this go round. Patient has had to consistently take pain medication for her L knee since surgery. Patient has not been able to walk with the RW around the house at this time. Using the RW mostly to transfer right now. Primarily using wheelchair for all other mobility. OBJECTIVE: 21   Observation:    Test measurements:    HIP Abd      HIP Ext       HIP IR       HIP ER       Knee ext 0 -3 lacking  Poor quad act noted. Knee Flex 124 85 p! Ankle PF       Ankle DF       Ankle In       Ankle Ev       Strength  LEFT RIGHT   HIP Flexors  4-/5 4-/5    HIP Abductors 4-/5 4-/5   HIP Ext       Hip ER       Knee EXT (quad) TBA 4-/5    Knee Flex (HS) TBA 3/5   Ankle DF       Ankle PF       Ankle Inv       Ankle EV               Circumference  Mid apex  7 cm prox       48.0 cm  51.5 cm     45.5 cm  51.0 cm     Gait:  Patient came in using a wheelchair today; unable to use RW at this time due to some moderate achiness in the R knee after suture removal performed at the same time as the L knee meniscus repair. RESTRICTIONS/PRECAUTIONS: High BP. Previous tobacco use (5 per day).  R knee scope, meniscus repair on 9/17/21. L knee scope, meniscus repair on 12/17/21. NWBing on the L for 6 weeks. Exercises/Interventions:     Therapeutic Exercise Resistance Sets/sec Reps Notes   Recumbent bike   5 min    Gastroc stretch w/strap Long sitting 30s 5    Hamstring stretch Long sitting 30s 5    Ankle pumps HEP   Heelslides w/strap Supine 10s 10    Quad sets  10s 10 Performed with NMES. See below. SAQ over bolster    LAQ @EOB 0-90; 3# ankle weight 2 10 Performed with BFR. SLR flexion 3# 2 10 Performed with BFR. SLR abduction 3# 2 10 Performed with BFR. SLR adduction 2# 2 10    [email or web address] LOP (184 mmHg) Blue cuff  8 min 30-15-15-15  SLR/sidelying hip abd/LAQ/standing hamstring curls           Leg press - 2 up SL down 60# 2 10                           Therapeutic Activities       Step ups/downs on 8\"  2 10 Cues to drive through heel, achieve TKE on R LE, slow control down during eccentric phase   TRX squat taps to chair Elevated x1 foam 2 10    Gait training w/RW - NWBing on L LE   10 min Worked on coordination of RW and R LE to maintain NWBIng on L LE following surgery coming up this Friday (12/17)                 Manual Intervention       R patella mobs - inferior, medial/lateral   5 min Gr. I-II   R knee PROM - supine   5 min                                NMR Re-education       NMES with QS - L LE 25 mA  8 min 10 sec on/20 sec off   [employer] - maze tracing - easy, medium, and difficult skill levels lvl 8  4x    CC TKE Resistance 4 5s 20    Mod. BOSU lunge isos  10s 10    Sport cord march (blue)  2 10 Each position; side/side   SLS balance on airex  30s 5    Reverse lunge w/slider  2 10             Access Code: [email or web address]  URL: [email or web address]  Date: 10/06/2021  Prepared by: Aaron Cueto    Exercises    *In addition to pre-op exercises including hamstring stretch, gastroc stretch, heelslides, quad sets, SLR flexion.             Therapeutic Exercise and NMR EXR  [x] (47991) Provided verbal/tactile cueing for activities related to strengthening, flexibility, endurance, ROM for improvements in LE, proximal hip, and core control with self care, mobility, lifting, ambulation. [x] (27355) Provided verbal/tactile cueing for activities related to improving balance, coordination, kinesthetic sense, posture, motor skill, proprioception  to assist with LE, proximal hip, and core control in self care, mobility, lifting, ambulation and eccentric single leg control.      NMR and Therapeutic Activities:    [x] (02524 or 85072) Provided verbal/tactile cueing for activities related to improving balance, coordination, kinesthetic sense, posture, motor skill, proprioception and motor activation to allow for proper function of core, proximal hip and LE with self care and ADLs  [x] (56058) Gait Re-education- Provided training and instruction to the patient for proper LE, core and proximal hip recruitment and positioning and eccentric body weight control with ambulation re-education including up and down stairs     Home Exercise Program:    [x] (60386) Reviewed/Progressed HEP activities related to strengthening, flexibility, endurance, ROM of core, proximal hip and LE for functional self-care, mobility, lifting and ambulation/stair navigation   [x] (34707)Reviewed/Progressed HEP activities related to improving balance, coordination, kinesthetic sense, posture, motor skill, proprioception of core, proximal hip and LE for self care, mobility, lifting, and ambulation/stair navigation      Manual Treatments:  PROM / STM / Oscillations-Mobs:  G-I, II, III, IV (PA's, Inf., Post.)  [x] (24008) Provided manual therapy to mobilize LE, proximal hip and/or LS spine soft tissue/joints for the purpose of modulating pain, promoting relaxation,  increasing ROM, reducing/eliminating soft tissue swelling/inflammation/restriction, improving soft tissue extensibility and allowing for proper ROM for normal function with self care, mobility, lifting and ambulation. Modalities:  Game Ready to R knee for pain/inflammation/edema - 10 minutes. 12/20    Charges:  Timed Code Treatment Minutes: 40   Total Treatment Minutes: 65       [] EVAL (LOW) 37642 (typically 20 minutes face-to-face)   [] EVAL (MOD) 86554 (typically 30 minutes face-to-face)  [] EVAL (HIGH) 77497 (typically 45 minutes face-to-face)  [x] RE-EVAL     [x] XK(98883) x 1    [] IONTO (27363)  [x] NMR (23454) x 1   [x] VASO (49176)  [x] Manual (43964) x 1    [] Other:  [] TA (30769)x    [] Mech Traction (58072)  [] ES(attended) (30142)     [] ES (un) (31428):     GOALS:  Patient stated goal: Be able to do normal activities. [x] Progressing: [] Met: [] Not Met: [] Adjusted    Therapist goals for Patient:   Short Term Goals: To be achieved in: 2 weeks  1. Independent in HEP and progression per patient tolerance, in order to prevent re-injury. [] Progressing: [x] Met: [] Not Met: [] Adjusted  2. Patient will have a decrease in pain to facilitate improvement in movement, function, and ADLs as indicated by Functional Deficits. [] Progressing: [x] Met: [] Not Met: [] Adjusted     Long Term Goals: To be achieved in: 8 weeks  1. Disability index score of 20% or less for the LEFS to assist with reaching prior level of function. [x] Progressing: [] Met: [] Not Met: [] Adjusted   2. Patient will demonstrate increased AROM to 0-120 to allow for proper joint functioning as indicated by patients Functional Deficits. [x] Progressing: [] Met: [] Not Met: [] Adjusted    3. Patient will demonstrate an increase in strength to good proximal hip strength and control, within 5lb HHD in LE to allow for proper functional mobility as indicated by patients Functional Deficits. [x] Progressing: [] Met: [] Not Met: [] Adjusted   4. Patient will demonstrate normal gait mechanics without increased symptoms or restriction to allow him to walk and perform all daily mobility.    [x] Progressing: [] Met: [] Not Met: [] Adjusted    5. Patient will be able to navigate stairs up/down with reciprocal gait mechanics without increased symptoms or restriction to allow him normal household mobility without restricted access to certain parts of his home. [x] Progressing: [] Met: [] Not Met: [] Adjusted           Progression Towards Functional goals:  [x] Patient is progressing as expected towards functional goals listed. [] Progression is slowed due to complexities listed. [] Progression has been slowed due to co-morbidities. [] Plan just implemented, too soon to assess goals progression  [] Other:     ASSESSMENT: Patient being re-evaluated today following L knee scope, meniscus repair, debridement on 12/17/21. Patient definitely having more pain on the L compared to immediately following R knee meniscus repair, so patient has been performing most mobility with wheelchair using RW only for transfers. Patient demonstrates s/s consistent with post-op status including L knee swelling/edema, ROM/joint mobility restrictions, poor quad activation, strength, stability, balance, gait deviations, and overall functional limitations. Patient will benefit from skilled PT to address deficits for full, safe return to PLOF pain free and without limitations. Return to Play: (if applicable)   []  Stage 1: Intro to Strength   []  Stage 2: Return to Run and Strength   []  Stage 3: Return to Jump and Strength   []  Stage 4: Dynamic Strength and Agility   []  Stage 5: Sport Specific Training     []  Ready to Return to Play, Meets All Above Stages   []  Not Ready for Return to Sports   Comments:            Treatment/Activity Tolerance:  [x] Patient tolerated treatment well [] Patient limited by fatique  [] Patient limited by pain  [] Patient limited by other medical complications  [] Other:     Overall Progression Towards Functional goals/ Treatment Progress Update:  [x] Patient is progressing as expected towards functional goals listed. [] Progression is slowed due to complexities/Impairments listed. [] Progression has been slowed due to co-morbidities. [] Plan just implemented, too soon to assess goals progression <30days   [] Goals require adjustment due to lack of progress  [] Patient is not progressing as expected and requires additional follow up with physician  [] Other    Prognosis for POC: [x] Good [] Fair  [] Poor    Patient requires continued skilled intervention: [x] Yes  [] No        PLAN:   [x] Continue per plan of care [] Alter current plan (see comments)  [] Plan of care initiated [] Hold pending MD visit [] Discharge    Electronically signed by: Nemesio Allen, PT, DPT, MS, SCS    Note: If patient does not return for scheduled/recommended follow up visits, this note will serve as a discharge from care along with the most recent update on progress.

## 2021-12-22 ENCOUNTER — HOSPITAL ENCOUNTER (OUTPATIENT)
Dept: PHYSICAL THERAPY | Age: 61
Setting detail: THERAPIES SERIES
Discharge: HOME | End: 2021-12-22
Payer: COMMERCIAL

## 2021-12-22 PROCEDURE — 97112 NEUROMUSCULAR REEDUCATION: CPT

## 2021-12-22 PROCEDURE — 97140 MANUAL THERAPY 1/> REGIONS: CPT

## 2021-12-22 PROCEDURE — 97110 THERAPEUTIC EXERCISES: CPT

## 2021-12-22 PROCEDURE — 97016 VASOPNEUMATIC DEVICE THERAPY: CPT

## 2021-12-22 NOTE — FLOWSHEET NOTE
[facility]    Physical Therapy Treatment Note/ Progress Report:     Date:  2021    Patient Name:  Amalia Gutierrez    :  1960  MRN: [id number]  Medical/Treatment Diagnosis Information:  · Diagnosis: M23.306 Other meniscus derangements, unspecified meniscus, R knee; M25.561 R knee pain  · Treatment Diagnosis: M23.306 Other meniscus derangements, unspecified meniscus, R knee; M25.561 R knee pain  Insurance/Certification information:  PT Insurance Information: [facility]  Physician Information:  Referring Practitioner: Cabrera Roth  Plan of care signed (Y/N):     Date of Patient follow up with Physician: 21     Progress Report: []  Yes  [x]  No     Functional Scale: 64% disability - LEFS ()   Date: 21    Date Range for reporting period:  Beginnin21  Endin days or 10 visits    Progress report due (10 Rx/or 30 days whichever is less): 17    Recertification due (POC duration/ or 90 days whichever is less): 22     Visit # Insurance Allowable Auth Needed   30  (2 post-op for L knee) [facility] []Yes    [x]No     Pain level:  1-2/10 at start of session; 9/10 at worst.      SUBJECTIVE:    L knee has felt better since last session. Definitely have not had as much pain as she did over the weekend. Feels like she is getting around a little better. Transferring from the wheelchair has been a challenge for the R knee. All the pivoting has made the R knee more sore. Patient is fearful that she is going to re-injure the R knee. OBJECTIVE: 21   Observation:    Test measurements:    HIP Abd      HIP Ext       HIP IR       HIP ER       Knee ext 0 -3 lacking  Poor quad act noted. Knee Flex 124 85 p!    Ankle PF       Ankle DF       Ankle In       Ankle Ev       Strength  LEFT RIGHT   HIP Flexors  4-/5 4-/5    HIP Abductors 4-/5 4-/5   HIP Ext       Hip ER       Knee EXT (quad) TBA 4-/5    Knee Flex (HS) TBA 3/5   Ankle DF Ankle PF       Ankle Inv       Ankle EV               Circumference  Mid apex  7 cm prox       48.0 cm  51.5 cm     45.5 cm  51.0 cm     Gait:  Patient came in using a wheelchair today; unable to use RW at this time due to some moderate achiness in the R knee after suture removal performed at the same time as the L knee meniscus repair. RESTRICTIONS/PRECAUTIONS: High BP. Previous tobacco use (5 per day). R knee scope, meniscus repair on 9/17/21. L knee scope, meniscus repair on 12/17/21. NWBing on the L for 6 weeks. Exercises/Interventions:     Therapeutic Exercise Resistance Sets/sec Reps Notes   Recumbent bike     Gastroc stretch w/strap Long sitting 30s 5    Hamstring stretch Long sitting 30s 5    Ankle pumps HEP   Heelslides w/strap Supine 10s 10    Quad sets  10s 10 Performed with NMES. See below. SAQ over bolster    LAQ @EOB 0-90; 3# ankle weight Performed with BFR. SLR flexion PT assist 1 10    SLR abduction 3# Performed with BFR. SLR adduction 2#    [email or web address] LOP (184 mmHg) Blue cuff 30-15-15-15  SLR/sidelying hip abd/LAQ/standing hamstring curls           Leg press - 2 up SL down 60#                           Therapeutic Activities       Step ups/downs on 8\"  2 10 Cues to drive through heel, achieve TKE on R LE, slow control down during eccentric phase   TRX squat taps to chair Elevated x1 foam    Gait training w/RW - NWBing on L LE  Worked on coordination of RW and R LE to maintain NWBIng on L LE following surgery coming up this Friday (12/17)                 Manual Intervention       R patella mobs - inferior, medial/lateral   5 min Gr. I-II   R knee PROM - supine   5 min                                NMR Re-education       NMES with QS - L LE 30 mA  10 min 10 sec on/20 sec off   [employer] tracing - easy, medium, and difficult skill levels lvl 8    CC TKE Resistance 4    Mod.  BOSU lunge isos     Sport cord march (blue)  Each position; side/side   SLS balance on airex     Reverse lunge manual therapy to mobilize LE, proximal hip and/or LS spine soft tissue/joints for the purpose of modulating pain, promoting relaxation,  increasing ROM, reducing/eliminating soft tissue swelling/inflammation/restriction, improving soft tissue extensibility and allowing for proper ROM for normal function with self care, mobility, lifting and ambulation. Modalities:  Game Ready to R knee for pain/inflammation/edema - 10 minutes. 12/22    Charges:  Timed Code Treatment Minutes: 40   Total Treatment Minutes: 50       [] EVAL (LOW) 00968 (typically 20 minutes face-to-face)   [] EVAL (MOD) 04272 (typically 30 minutes face-to-face)  [] EVAL (HIGH) 55722 (typically 45 minutes face-to-face)  [] RE-EVAL     [x] PG(49962) x 1    [] IONTO (22140)  [x] NMR (89617) x 1   [x] VASO (21040)  [x] Manual (03205) x 1    [] Other:  [] TA (33447)x    [] Mech Traction (31466)  [] ES(attended) (73771)     [] ES (un) (19524):     GOALS:  Patient stated goal: Be able to do normal activities. [x] Progressing: [] Met: [] Not Met: [] Adjusted    Therapist goals for Patient:   Short Term Goals: To be achieved in: 2 weeks  1. Independent in HEP and progression per patient tolerance, in order to prevent re-injury. [] Progressing: [x] Met: [] Not Met: [] Adjusted  2. Patient will have a decrease in pain to facilitate improvement in movement, function, and ADLs as indicated by Functional Deficits. [] Progressing: [x] Met: [] Not Met: [] Adjusted     Long Term Goals: To be achieved in: 8 weeks  1. Disability index score of 20% or less for the LEFS to assist with reaching prior level of function. [x] Progressing: [] Met: [] Not Met: [] Adjusted   2. Patient will demonstrate increased AROM to 0-120 to allow for proper joint functioning as indicated by patients Functional Deficits. [x] Progressing: [] Met: [] Not Met: [] Adjusted    3.  Patient will demonstrate an increase in strength to good proximal hip strength and control, within 5lb HHD in LE to allow for proper functional mobility as indicated by patients Functional Deficits. [x] Progressing: [] Met: [] Not Met: [] Adjusted   4. Patient will demonstrate normal gait mechanics without increased symptoms or restriction to allow him to walk and perform all daily mobility. [x] Progressing: [] Met: [] Not Met: [] Adjusted    5. Patient will be able to navigate stairs up/down with reciprocal gait mechanics without increased symptoms or restriction to allow him normal household mobility without restricted access to certain parts of his home. [x] Progressing: [] Met: [] Not Met: [] Adjusted           Progression Towards Functional goals:  [x] Patient is progressing as expected towards functional goals listed. [] Progression is slowed due to complexities listed. [] Progression has been slowed due to co-morbidities. [] Plan just implemented, too soon to assess goals progression  [] Other:     ASSESSMENT: L knee pain has been better since last session on Monday. Continued focus of session on L knee ROM, general flexibility, and quad activation tasks maintaining NWBing status on L LE to protect healing tissue. Able to initiate SLR flexion during today's session, but required PT assist due to continued quad activation deficits.      Return to Play: (if applicable)   []  Stage 1: Intro to Strength   []  Stage 2: Return to Run and Strength   []  Stage 3: Return to Jump and Strength   []  Stage 4: Dynamic Strength and Agility   []  Stage 5: Sport Specific Training     []  Ready to Return to Play, Meets All Above Stages   []  Not Ready for Return to Sports   Comments:            Treatment/Activity Tolerance:  [x] Patient tolerated treatment well [] Patient limited by fatique  [] Patient limited by pain  [] Patient limited by other medical complications  [] Other:     Overall Progression Towards Functional goals/ Treatment Progress Update:  [x] Patient is progressing as expected towards functional goals listed. [] Progression is slowed due to complexities/Impairments listed. [] Progression has been slowed due to co-morbidities. [] Plan just implemented, too soon to assess goals progression <30days   [] Goals require adjustment due to lack of progress  [] Patient is not progressing as expected and requires additional follow up with physician  [] Other    Prognosis for POC: [x] Good [] Fair  [] Poor    Patient requires continued skilled intervention: [x] Yes  [] No        PLAN:   [x] Continue per plan of care [] Alter current plan (see comments)  [] Plan of care initiated [] Hold pending MD visit [] Discharge    Electronically signed by: Nazario Bates, PT, DPT, MS, SCS    Note: If patient does not return for scheduled/recommended follow up visits, this note will serve as a discharge from care along with the most recent update on progress.

## 2021-12-27 ENCOUNTER — HOSPITAL ENCOUNTER (OUTPATIENT)
Dept: PHYSICAL THERAPY | Age: 61
Setting detail: THERAPIES SERIES
Discharge: HOME | End: 2021-12-27
Payer: COMMERCIAL

## 2021-12-27 PROCEDURE — 97016 VASOPNEUMATIC DEVICE THERAPY: CPT

## 2021-12-27 PROCEDURE — 97110 THERAPEUTIC EXERCISES: CPT

## 2021-12-27 PROCEDURE — 97140 MANUAL THERAPY 1/> REGIONS: CPT

## 2021-12-27 PROCEDURE — 97112 NEUROMUSCULAR REEDUCATION: CPT

## 2021-12-27 NOTE — FLOWSHEET NOTE
[address] [facility]    Physical Therapy Treatment Note/ Progress Report:     Date:  2021    Patient Name:  Dayana Santos    :  1960  MRN: [id number]  Medical/Treatment Diagnosis Information:  · Diagnosis: M23.306 Other meniscus derangements, unspecified meniscus, R knee; M25.561 R knee pain  · Treatment Diagnosis: M23.306 Other meniscus derangements, unspecified meniscus, R knee; M25.561 R knee pain  Insurance/Certification information:  PT Insurance Information: [facility]  Physician Information:  Referring Practitioner: Eric Wei  Plan of care signed (Y/N):     Date of Patient follow up with Physician: 21     Progress Report: []  Yes  [x]  No     Functional Scale: 64% disability - LEFS ()   Date: 21    Date Range for reporting period:  Beginnin21  Endin days or 10 visits    Progress report due (10 Rx/or 30 days whichever is less): 28    Recertification due (POC duration/ or 90 days whichever is less): 22     Visit # Insurance Allowable Auth Needed   31  (3 post-op for L knee) [facility] []Yes    [x]No     Pain level:  1-2/10 at start of session; 9/10 at worst.      SUBJECTIVE:   Patient had a lot of discomfort in her R shoulder last week because she was using her shoulders to help her take some load off of the R knee when transferring in and out of the wheelchair. R shoulder is feeling better today because patient has been trying to use momentum to help her transfer in and out of the wheelchair. Feels like she has been getting around a lot better over the weekend. Patient was able to stand up to shower without putting weight through her L leg. OBJECTIVE: 21   Observation:    Test measurements:    HIP Abd      HIP Ext       HIP IR       HIP ER       Knee ext 0 -3 lacking  Poor quad act noted. Knee Flex 124 85 p!    Ankle PF       Ankle DF       Ankle In       Ankle Ev       Strength  LEFT RIGHT   HIP Flexors  4-/5 4-/5    HIP Abductors 4-/5 4-/5   HIP Ext       Hip ER       Knee EXT (quad) TBA 4-/5    Knee Flex (HS) TBA 3/5   Ankle DF       Ankle PF       Ankle Inv       Ankle EV               Circumference  Mid apex  7 cm prox       48.0 cm  51.5 cm     45.5 cm  51.0 cm     Gait:  Patient came in using a wheelchair today; unable to use RW at this time due to some moderate achiness in the R knee after suture removal performed at the same time as the L knee meniscus repair. RESTRICTIONS/PRECAUTIONS: High BP. Previous tobacco use (5 per day). R knee scope, meniscus repair on 9/17/21. L knee scope, meniscus repair on 12/17/21. NWBing on the L for 6 weeks. Exercises/Interventions:     Therapeutic Exercise Resistance Sets/sec Reps Notes   Recumbent bike     Gastroc stretch w/strap Long sitting 30s 5    Hamstring stretch Long sitting 30s 5    Ankle pumps HEP   Heelslides w/strap Supine 10s 10    Quad sets  10s 10 Performed with NMES. See below. SAQ over bolster    LAQ @EOB 0-90; 3# ankle weight Performed with BFR. SLR flexion  2 10 Cues to initiate each rep with QS first before raising. SLR abduction 3# Performed with BFR.    SLR adduction 2#    Narcissus@170 Systems LOP (184 mmHg) Blue cuff 30-15-15-15  SLR/sidelying hip abd/LAQ/standing hamstring curls           Leg press - 2 up SL down 60#                           Therapeutic Activities       Step ups/downs on 8\"  Cues to drive through heel, achieve TKE on R LE, slow control down during eccentric phase   TRX squat taps to chair Elevated x1 foam    Gait training w/RW - NWBing on L LE  Worked on coordination of RW and R LE to maintain NWBIng on L LE following surgery coming up this Friday (12/17)                 Manual Intervention       R patella mobs - inferior, medial/lateral   5 min Gr. I-II   R knee PROM - supine   5 min                                NMR Re-education       NMES with QS - L LE 30 mA  10 min 10 sec on/20 sec off   [employer] tracing - easy, medium, and difficult skill levels lvl 8    CC TKE Resistance 4    Mod. BOSU lunge isos     Sport cord march (blue)  Each position; side/side   SLS balance on airex     Reverse lunge w/slider              Access Code: [email or web address]  URL: [email or web address]  Date: 10/06/2021  Prepared by: Amy Esquivel    Exercises    *In addition to pre-op exercises including hamstring stretch, gastroc stretch, heelslides, quad sets, SLR flexion. Therapeutic Exercise and NMR EXR  [x] (01199) Provided verbal/tactile cueing for activities related to strengthening, flexibility, endurance, ROM for improvements in LE, proximal hip, and core control with self care, mobility, lifting, ambulation. [x] (03802) Provided verbal/tactile cueing for activities related to improving balance, coordination, kinesthetic sense, posture, motor skill, proprioception  to assist with LE, proximal hip, and core control in self care, mobility, lifting, ambulation and eccentric single leg control.      NMR and Therapeutic Activities:    [x] (93754 or 00383) Provided verbal/tactile cueing for activities related to improving balance, coordination, kinesthetic sense, posture, motor skill, proprioception and motor activation to allow for proper function of core, proximal hip and LE with self care and ADLs  [x] (46348) Gait Re-education- Provided training and instruction to the patient for proper LE, core and proximal hip recruitment and positioning and eccentric body weight control with ambulation re-education including up and down stairs     Home Exercise Program:    [x] (24539) Reviewed/Progressed HEP activities related to strengthening, flexibility, endurance, ROM of core, proximal hip and LE for functional self-care, mobility, lifting and ambulation/stair navigation   [x] (99535)Reviewed/Progressed HEP activities related to improving balance, coordination, kinesthetic sense, posture, motor skill, proprioception of core, proximal hip and LE for self care, mobility, lifting, and ambulation/stair navigation      Manual Treatments:  PROM / STM / Oscillations-Mobs:  G-I, II, III, IV (PA's, Inf., Post.)  [x] (10672) Provided manual therapy to mobilize LE, proximal hip and/or LS spine soft tissue/joints for the purpose of modulating pain, promoting relaxation,  increasing ROM, reducing/eliminating soft tissue swelling/inflammation/restriction, improving soft tissue extensibility and allowing for proper ROM for normal function with self care, mobility, lifting and ambulation. Modalities:  Game Ready to R knee for pain/inflammation/edema - 10 minutes. 12/27    Charges:  Timed Code Treatment Minutes: 40   Total Treatment Minutes: 50       [] EVAL (LOW) 48706 (typically 20 minutes face-to-face)   [] EVAL (MOD) 70363 (typically 30 minutes face-to-face)  [] EVAL (HIGH) 20788 (typically 45 minutes face-to-face)  [] RE-EVAL     [x] UH(70871) x 1    [] IONTO (46650)  [x] NMR (04681) x 1   [x] VASO (53166)  [x] Manual (10437) x 1    [] Other:  [] TA (35411)x    [] Mech Traction (28375)  [] ES(attended) (92690)     [] ES (un) (30787):     GOALS:  Patient stated goal: Be able to do normal activities. [x] Progressing: [] Met: [] Not Met: [] Adjusted    Therapist goals for Patient:   Short Term Goals: To be achieved in: 2 weeks  1. Independent in HEP and progression per patient tolerance, in order to prevent re-injury. [] Progressing: [x] Met: [] Not Met: [] Adjusted  2. Patient will have a decrease in pain to facilitate improvement in movement, function, and ADLs as indicated by Functional Deficits. [] Progressing: [x] Met: [] Not Met: [] Adjusted     Long Term Goals: To be achieved in: 8 weeks  1. Disability index score of 20% or less for the LEFS to assist with reaching prior level of function. [x] Progressing: [] Met: [] Not Met: [] Adjusted   2.  Patient will demonstrate increased AROM to 0-120 to allow for proper joint functioning as indicated by patients Functional Deficits. [x] Progressing: [] Met: [] Not Met: [] Adjusted    3. Patient will demonstrate an increase in strength to good proximal hip strength and control, within 5lb HHD in LE to allow for proper functional mobility as indicated by patients Functional Deficits. [x] Progressing: [] Met: [] Not Met: [] Adjusted   4. Patient will demonstrate normal gait mechanics without increased symptoms or restriction to allow him to walk and perform all daily mobility. [x] Progressing: [] Met: [] Not Met: [] Adjusted    5. Patient will be able to navigate stairs up/down with reciprocal gait mechanics without increased symptoms or restriction to allow him normal household mobility without restricted access to certain parts of his home. [x] Progressing: [] Met: [] Not Met: [] Adjusted           Progression Towards Functional goals:  [x] Patient is progressing as expected towards functional goals listed. [] Progression is slowed due to complexities listed. [] Progression has been slowed due to co-morbidities. [] Plan just implemented, too soon to assess goals progression  [] Other:     ASSESSMENT: Continued focus of session on L knee ROM, general flexibility, and quad activation tasks maintaining NWBing status on L LE to protect healing tissue. Patient able to perform flexion SLR independently with no PT assist today, but does still require cueing to initiate each rep with a QS before raising. Sees Dr. Deb Ty for follow up and suture removal on Wednesday this week prior to PT session.     Return to Play: (if applicable)   []  Stage 1: Intro to Strength   []  Stage 2: Return to Run and Strength   []  Stage 3: Return to Jump and Strength   []  Stage 4: Dynamic Strength and Agility   []  Stage 5: Sport Specific Training     []  Ready to Return to Play, Meets All Above Stages   []  Not Ready for Return to Sports   Comments:            Treatment/Activity Tolerance:  [x] Patient tolerated treatment well [] Patient

## 2021-12-29 ENCOUNTER — OFFICE VISIT (OUTPATIENT)
Dept: ORTHOPEDIC SURGERY | Age: 61
End: 2021-12-29

## 2021-12-29 ENCOUNTER — HOSPITAL ENCOUNTER (OUTPATIENT)
Dept: PHYSICAL THERAPY | Age: 61
Setting detail: THERAPIES SERIES
Discharge: HOME | End: 2021-12-29
Payer: COMMERCIAL

## 2021-12-29 VITALS — HEIGHT: 69 IN | BODY MASS INDEX: 36.29 KG/M2 | WEIGHT: 245 LBS

## 2021-12-29 DIAGNOSIS — Z98.890 HISTORY OF MEDIAL MENISCUS REPAIR OF LEFT KNEE: Primary | ICD-10-CM

## 2021-12-29 PROCEDURE — 97112 NEUROMUSCULAR REEDUCATION: CPT

## 2021-12-29 PROCEDURE — 97140 MANUAL THERAPY 1/> REGIONS: CPT

## 2021-12-29 PROCEDURE — 97110 THERAPEUTIC EXERCISES: CPT

## 2021-12-29 PROCEDURE — 99024 POSTOP FOLLOW-UP VISIT: CPT | Performed by: ORTHOPAEDIC SURGERY

## 2021-12-29 NOTE — FLOWSHEET NOTE
[facility]    Physical Therapy Treatment Note/ Progress Report:     Date:  2021    Patient Name:  Kaylin Zepeda    :  1960  MRN: [id number]  Medical/Treatment Diagnosis Information:  · Diagnosis: M23.306 Other meniscus derangements, unspecified meniscus, R knee; M25.561 R knee pain  · Treatment Diagnosis: M23.306 Other meniscus derangements, unspecified meniscus, R knee; M25.561 R knee pain  Insurance/Certification information:  PT Insurance Information: [facility]  Physician Information:  Referring Practitioner: Nupur Wise  Plan of care signed (Y/N):     Date of Patient follow up with Physician: 21     Progress Report: []  Yes  [x]  No     Functional Scale: 64% disability - LEFS ()   Date: 21    Date Range for reporting period:  Beginnin21  Endin days or 10 visits    Progress report due (10 Rx/or 30 days whichever is less):     Recertification due (POC duration/ or 90 days whichever is less): 22     Visit # Insurance Allowable Auth Needed   32  (4 post-op for L knee) [facility] []Yes    [x]No     Pain level:  1-2/10 at start of session; 9/10 at worst.      SUBJECTIVE:   Sees Dr. Dyllan Merchant for follow up and suture removal immediately after PT today. OBJECTIVE: 21   Observation:    Test measurements:    HIP Abd      HIP Ext       HIP IR       HIP ER       Knee ext 0 -3 lacking  Poor quad act noted. Knee Flex 124 85 p!    Ankle PF       Ankle DF       Ankle In       Ankle Ev       Strength  LEFT RIGHT   HIP Flexors  4-/5 4-/5    HIP Abductors 4-/5 4-/5   HIP Ext       Hip ER       Knee EXT (quad) TBA 4-/5    Knee Flex (HS) TBA 3/5   Ankle DF       Ankle PF       Ankle Inv       Ankle EV               Circumference  Mid apex  7 cm prox       48.0 cm  51.5 cm     45.5 cm  51.0 cm     Gait:  Patient came in using a wheelchair today; unable to use RW at this time due to some moderate achiness in the R knee after suture removal performed at the same time as the L knee meniscus repair. RESTRICTIONS/PRECAUTIONS: High BP. Previous tobacco use (5 per day). R knee scope, meniscus repair on 9/17/21. L knee scope, meniscus repair on 12/17/21. NWBing on the L for 6 weeks. Exercises/Interventions:     Therapeutic Exercise Resistance Sets/sec Reps Notes   Recumbent bike     Gastroc stretch w/strap Long sitting 30s 5    Hamstring stretch Long sitting 30s 5    Ankle pumps HEP   Heelslides w/strap Supine 10s 10    Quad sets  10s 10 Performed with NMES. See below. SAQ over bolster    LAQ @EOB 0-90; 3# ankle weight Performed with BFR. SLR flexion  2 10 Cues to initiate each rep with QS first before raising. SLR abduction 3# Performed with BFR. SLR adduction 2#    Benson@LVL6 LOP (184 mmHg) Blue cuff 30-15-15-15  SLR/sidelying hip abd/LAQ/standing hamstring curls           Leg press - 2 up SL down 60#                           Therapeutic Activities       Step ups/downs on 8\"  Cues to drive through heel, achieve TKE on R LE, slow control down during eccentric phase   TRX squat taps to chair Elevated x1 foam    Gait training w/RW - NWBing on L LE  Worked on coordination of RW and R LE to maintain NWBIng on L LE following surgery coming up this Friday (12/17)                 Manual Intervention       R patella mobs - inferior, medial/lateral - supine, EOB   6 min Gr. I-II   R knee PROM - supine, EOB   6 min                                NMR Re-education       NMES with QS/SLR - L LE 30 mA  10 min 10 sec on/20 sec off  Went back to QS if fatigued with SLR. [employer] - maze tracing - easy, medium, and difficult skill levels lvl 8    CC TKE Resistance 4    Mod. BOSU lunge isos     Sport cord march (blue)  Each position; side/side   SLS balance on airex     Reverse lunge w/slider              Access Code: [email or web address]  URL: [email or web address]  Date: 10/06/2021  Prepared by: Susan Guaman    *In addition to pre-op exercises including hamstring stretch, gastroc stretch, heelslides, quad sets, SLR flexion. Therapeutic Exercise and NMR EXR  [x] (22463) Provided verbal/tactile cueing for activities related to strengthening, flexibility, endurance, ROM for improvements in LE, proximal hip, and core control with self care, mobility, lifting, ambulation. [x] (11403) Provided verbal/tactile cueing for activities related to improving balance, coordination, kinesthetic sense, posture, motor skill, proprioception  to assist with LE, proximal hip, and core control in self care, mobility, lifting, ambulation and eccentric single leg control.      NMR and Therapeutic Activities:    [x] (06476 or 86682) Provided verbal/tactile cueing for activities related to improving balance, coordination, kinesthetic sense, posture, motor skill, proprioception and motor activation to allow for proper function of core, proximal hip and LE with self care and ADLs  [x] (75458) Gait Re-education- Provided training and instruction to the patient for proper LE, core and proximal hip recruitment and positioning and eccentric body weight control with ambulation re-education including up and down stairs     Home Exercise Program:    [x] (41950) Reviewed/Progressed HEP activities related to strengthening, flexibility, endurance, ROM of core, proximal hip and LE for functional self-care, mobility, lifting and ambulation/stair navigation   [x] (36372)Reviewed/Progressed HEP activities related to improving balance, coordination, kinesthetic sense, posture, motor skill, proprioception of core, proximal hip and LE for self care, mobility, lifting, and ambulation/stair navigation      Manual Treatments:  PROM / STM / Oscillations-Mobs:  G-I, II, III, IV (PA's, Inf., Post.)  [x] (77943) Provided manual therapy to mobilize LE, proximal hip and/or LS spine soft tissue/joints for the purpose of modulating pain, promoting relaxation,  increasing ROM, reducing/eliminating soft tissue swelling/inflammation/restriction, improving soft tissue extensibility and allowing for proper ROM for normal function with self care, mobility, lifting and ambulation. Modalities:   Deferred. 12/29    Charges:  Timed Code Treatment Minutes: 40   Total Treatment Minutes: 40       [] EVAL (LOW) 30244 (typically 20 minutes face-to-face)   [] EVAL (MOD) 20151 (typically 30 minutes face-to-face)  [] EVAL (HIGH) 12735 (typically 45 minutes face-to-face)  [] RE-EVAL     [x] QE(81404) x 1    [] IONTO (84932)  [x] NMR (96884) x 1   [x] VASO (35056)  [x] Manual (60737) x 1    [] Other:  [] TA (10863)x    [] Mech Traction (67686)  [] ES(attended) (49116)     [] ES (un) (31844):     GOALS:  Patient stated goal: Be able to do normal activities. [x] Progressing: [] Met: [] Not Met: [] Adjusted    Therapist goals for Patient:   Short Term Goals: To be achieved in: 2 weeks  1. Independent in HEP and progression per patient tolerance, in order to prevent re-injury. [] Progressing: [x] Met: [] Not Met: [] Adjusted  2. Patient will have a decrease in pain to facilitate improvement in movement, function, and ADLs as indicated by Functional Deficits. [] Progressing: [x] Met: [] Not Met: [] Adjusted     Long Term Goals: To be achieved in: 8 weeks  1. Disability index score of 20% or less for the LEFS to assist with reaching prior level of function. [x] Progressing: [] Met: [] Not Met: [] Adjusted   2. Patient will demonstrate increased AROM to 0-120 to allow for proper joint functioning as indicated by patients Functional Deficits. [x] Progressing: [] Met: [] Not Met: [] Adjusted    3. Patient will demonstrate an increase in strength to good proximal hip strength and control, within 5lb HHD in LE to allow for proper functional mobility as indicated by patients Functional Deficits. [x] Progressing: [] Met: [] Not Met: [] Adjusted   4.  Patient will demonstrate normal gait mechanics without increased symptoms or restriction to allow him to walk and perform all daily mobility. [x] Progressing: [] Met: [] Not Met: [] Adjusted    5. Patient will be able to navigate stairs up/down with reciprocal gait mechanics without increased symptoms or restriction to allow him normal household mobility without restricted access to certain parts of his home. [x] Progressing: [] Met: [] Not Met: [] Adjusted           Progression Towards Functional goals:  [x] Patient is progressing as expected towards functional goals listed. [] Progression is slowed due to complexities listed. [] Progression has been slowed due to co-morbidities. [] Plan just implemented, too soon to assess goals progression  [] Other:     ASSESSMENT: Continued focus of session on L knee ROM, general flexibility, and quad activation tasks maintaining NWBing status on L LE to protect healing tissue. Progressed NMES to include performance with both QS and SLRs. Sees Dr. Holly Lopez for follow up and suture removal immediately following today's session. Return to Play: (if applicable)   []  Stage 1: Intro to Strength   []  Stage 2: Return to Run and Strength   []  Stage 3: Return to Jump and Strength   []  Stage 4: Dynamic Strength and Agility   []  Stage 5: Sport Specific Training     []  Ready to Return to Play, Meets All Above Stages   []  Not Ready for Return to Sports   Comments:            Treatment/Activity Tolerance:  [x] Patient tolerated treatment well [] Patient limited by fatique  [] Patient limited by pain  [] Patient limited by other medical complications  [] Other:     Overall Progression Towards Functional goals/ Treatment Progress Update:  [x] Patient is progressing as expected towards functional goals listed. [] Progression is slowed due to complexities/Impairments listed. [] Progression has been slowed due to co-morbidities.   [] Plan just implemented, too soon to assess goals progression <30days   [] Goals require adjustment due to lack of progress  [] Patient is not progressing as expected and requires additional follow up with physician  [] Other    Prognosis for POC: [x] Good [] Fair  [] Poor    Patient requires continued skilled intervention: [x] Yes  [] No        PLAN:   [x] Continue per plan of care [] Alter current plan (see comments)  [] Plan of care initiated [] Hold pending MD visit [] Discharge    Electronically signed by: Farhan Cuevas, PT, DPT, MS, SCS    Note: If patient does not return for scheduled/recommended follow up visits, this note will serve as a discharge from care along with the most recent update on progress.

## 2022-01-03 ENCOUNTER — HOSPITAL ENCOUNTER (OUTPATIENT)
Dept: PHYSICAL THERAPY | Age: 62
Setting detail: THERAPIES SERIES
Discharge: HOME | End: 2022-01-03
Payer: COMMERCIAL

## 2022-01-03 PROCEDURE — 97140 MANUAL THERAPY 1/> REGIONS: CPT

## 2022-01-03 PROCEDURE — 97110 THERAPEUTIC EXERCISES: CPT

## 2022-01-03 PROCEDURE — 97112 NEUROMUSCULAR REEDUCATION: CPT

## 2022-01-03 PROCEDURE — 97016 VASOPNEUMATIC DEVICE THERAPY: CPT

## 2022-01-03 NOTE — FLOWSHEET NOTE
[address] [facility]    Physical Therapy Treatment Note/ Progress Report:     Date:  1/3/2022    Patient Name:  Arminda Hutchins    :  1960  MRN: [id number]  Medical/Treatment Diagnosis Information:  · Diagnosis: M23.306 Other meniscus derangements, unspecified meniscus, R knee; M25.561 R knee pain  · Treatment Diagnosis: M23.306 Other meniscus derangements, unspecified meniscus, R knee; M25.561 R knee pain  Insurance/Certification information:  PT Insurance Information: [facility]  Physician Information:  Referring Practitioner: Stacy Davis  Plan of care signed (Y/N):     Date of Patient follow up with Physician: 21     Progress Report: []  Yes  [x]  No     Functional Scale: 64% disability - LEFS ()   Date: 21    Date Range for reporting period:  Beginnin21  Endin days or 10 visits    Progress report due (10 Rx/or 30 days whichever is less):     Recertification due (POC duration/ or 90 days whichever is less): 22     Visit # Insurance Allowable Auth Needed   33  (5 post-op for L knee) [facility] []Yes    [x]No     Pain level:  1-2/10 at start of session; 9/10 at worst.      SUBJECTIVE:   Sutures removed at follow up last Wednesday. Steri-strips applied. Reports that she has been able to stand up at the stove to cook for a short period of time. Still not putting any weight through her L leg, just using it for balance when she is standing up to shower or cook. OBJECTIVE: 21   Observation:    Test measurements:    HIP Abd      HIP Ext       HIP IR       HIP ER       Knee ext 0 -3 lacking  Poor quad act noted. Knee Flex 124 85 p!    Ankle PF       Ankle DF       Ankle In       Ankle Ev       Strength  LEFT RIGHT   HIP Flexors  4-/5 4-/5    HIP Abductors 4-/5 4-/5   HIP Ext       Hip ER       Knee EXT (quad) TBA 4-/5    Knee Flex (HS) TBA 3/5   Ankle DF       Ankle PF       Ankle Inv       Ankle EV Circumference  Mid apex  7 cm prox       48.0 cm  51.5 cm     45.5 cm  51.0 cm     Gait:  Patient came in using a wheelchair today; unable to use RW at this time due to some moderate achiness in the R knee after suture removal performed at the same time as the L knee meniscus repair. RESTRICTIONS/PRECAUTIONS: High BP. Previous tobacco use (5 per day). R knee scope, meniscus repair on 9/17/21. L knee scope, meniscus repair on 12/17/21. NWBing on the L for 6 weeks. Exercises/Interventions:     Therapeutic Exercise Resistance Sets/sec Reps Notes   Recumbent bike     Gastroc stretch w/strap Long sitting 30s 5    Hamstring stretch Long sitting 30s 5    Ankle pumps HEP   Heelslides w/strap Supine 10s 10    Quad sets  10s 10 Performed with NMES. See below. SAQ over bolster 5s 2x10    LAQ @EOB 0-90; 3# ankle weight Performed with BFR. SLR flexion  1 10 Cues to initiate each rep with QS first before raising. SLR abduction  2 10    SLR adduction 2#    [email or web address] LOP (184 mmHg) Blue cuff 30-15-15-15  SLR/sidelying hip abd/LAQ/standing hamstring curls           Leg press - 2 up SL down 60#                           Therapeutic Activities       Step ups/downs on 8\"  Cues to drive through heel, achieve TKE on R LE, slow control down during eccentric phase   TRX squat taps to chair Elevated x1 foam    Gait training w/RW - NWBing on L LE  Worked on coordination of RW and R LE to maintain NWBIng on L LE following surgery coming up this Friday (12/17)                 Manual Intervention       R patella mobs - inferior, medial/lateral - supine, EOB   6 min Gr. I-II   R knee PROM - supine, EOB   6 min                                NMR Re-education       NMES with QS/SLR - L LE 55 mA  10 min 10 sec on/20 sec off  Went back to QS if fatigued with SLR. Biodex - maze tracing - easy, medium, and difficult skill levels lvl 8    CC TKE Resistance 4    Mod.  BOSU lunge isos     Sport cord march (blue)  Each position; side/side SLS balance on airex     Reverse lunge w/slider              Access Code: [email or web address]  URL: [email or web address]  Date: 10/06/2021  Prepared by: Bhanu Wong    Exercises    *In addition to pre-op exercises including hamstring stretch, gastroc stretch, heelslides, quad sets, SLR flexion. Therapeutic Exercise and NMR EXR  [x] (99205) Provided verbal/tactile cueing for activities related to strengthening, flexibility, endurance, ROM for improvements in LE, proximal hip, and core control with self care, mobility, lifting, ambulation. [x] (88232) Provided verbal/tactile cueing for activities related to improving balance, coordination, kinesthetic sense, posture, motor skill, proprioception  to assist with LE, proximal hip, and core control in self care, mobility, lifting, ambulation and eccentric single leg control.      NMR and Therapeutic Activities:    [x] (59967 or 92474) Provided verbal/tactile cueing for activities related to improving balance, coordination, kinesthetic sense, posture, motor skill, proprioception and motor activation to allow for proper function of core, proximal hip and LE with self care and ADLs  [x] (55660) Gait Re-education- Provided training and instruction to the patient for proper LE, core and proximal hip recruitment and positioning and eccentric body weight control with ambulation re-education including up and down stairs     Home Exercise Program:    [x] (70909) Reviewed/Progressed HEP activities related to strengthening, flexibility, endurance, ROM of core, proximal hip and LE for functional self-care, mobility, lifting and ambulation/stair navigation   [x] (67421)Reviewed/Progressed HEP activities related to improving balance, coordination, kinesthetic sense, posture, motor skill, proprioception of core, proximal hip and LE for self care, mobility, lifting, and ambulation/stair navigation      Manual Treatments:  PROM / STM / Oscillations-Mobs:  G-I, II, III, IV (PA's, Inf., Post.)  [x] (02987) Provided manual therapy to mobilize LE, proximal hip and/or LS spine soft tissue/joints for the purpose of modulating pain, promoting relaxation,  increasing ROM, reducing/eliminating soft tissue swelling/inflammation/restriction, improving soft tissue extensibility and allowing for proper ROM for normal function with self care, mobility, lifting and ambulation. Modalities:  Game Ready to R knee for pain/inflammation/edema - 10 minutes. 1/3    Charges:  Timed Code Treatment Minutes: 40   Total Treatment Minutes: 50       [] EVAL (LOW) 90830 (typically 20 minutes face-to-face)   [] EVAL (MOD) 44533 (typically 30 minutes face-to-face)  [] EVAL (HIGH) 59603 (typically 45 minutes face-to-face)  [] RE-EVAL     [x] LD(04264) x 1    [] IONTO (88203)  [x] NMR (60813) x 1   [x] VASO (33101)  [x] Manual (68470) x 1    [] Other:  [] TA (86169)x    [] Mech Traction (57028)  [] ES(attended) (87863)     [] ES (un) (50722):     GOALS:  Patient stated goal: Be able to do normal activities. [x] Progressing: [] Met: [] Not Met: [] Adjusted    Therapist goals for Patient:   Short Term Goals: To be achieved in: 2 weeks  1. Independent in HEP and progression per patient tolerance, in order to prevent re-injury. [] Progressing: [x] Met: [] Not Met: [] Adjusted  2. Patient will have a decrease in pain to facilitate improvement in movement, function, and ADLs as indicated by Functional Deficits. [] Progressing: [x] Met: [] Not Met: [] Adjusted     Long Term Goals: To be achieved in: 8 weeks  1. Disability index score of 20% or less for the LEFS to assist with reaching prior level of function. [x] Progressing: [] Met: [] Not Met: [] Adjusted   2. Patient will demonstrate increased AROM to 0-120 to allow for proper joint functioning as indicated by patients Functional Deficits. [x] Progressing: [] Met: [] Not Met: [] Adjusted    3.  Patient will demonstrate an increase in strength to good proximal hip strength and control, within 5lb HHD in LE to allow for proper functional mobility as indicated by patients Functional Deficits. [x] Progressing: [] Met: [] Not Met: [] Adjusted   4. Patient will demonstrate normal gait mechanics without increased symptoms or restriction to allow him to walk and perform all daily mobility. [x] Progressing: [] Met: [] Not Met: [] Adjusted    5. Patient will be able to navigate stairs up/down with reciprocal gait mechanics without increased symptoms or restriction to allow him normal household mobility without restricted access to certain parts of his home. [x] Progressing: [] Met: [] Not Met: [] Adjusted           Progression Towards Functional goals:  [x] Patient is progressing as expected towards functional goals listed. [] Progression is slowed due to complexities listed. [] Progression has been slowed due to co-morbidities. [] Plan just implemented, too soon to assess goals progression  [] Other:     ASSESSMENT: 2 weeks s/p. Sutures were removed and steri-strips applied at follow up with Dr. Vinny Worrell last week. Incision sites appear to be closed and healing well. No sites of drainage or areas of concern upon inspection today. Continued focus of session on L knee ROM, general flexibility, and quad activation tasks maintaining NWBing status on L LE to protect healing tissue. Able to initiate SAQ for further L quad strengthening and sidelying hip abduction to initiate some NWBing hip strengthening.      Return to Play: (if applicable)   []  Stage 1: Intro to Strength   []  Stage 2: Return to Run and Strength   []  Stage 3: Return to Jump and Strength   []  Stage 4: Dynamic Strength and Agility   []  Stage 5: Sport Specific Training     []  Ready to Return to Play, Meets All Above Stages   []  Not Ready for Return to Sports   Comments:            Treatment/Activity Tolerance:  [x] Patient tolerated treatment well [] Patient limited by elmer  [] Patient limited by pain  [] Patient limited by other medical complications  [] Other:     Overall Progression Towards Functional goals/ Treatment Progress Update:  [x] Patient is progressing as expected towards functional goals listed. [] Progression is slowed due to complexities/Impairments listed. [] Progression has been slowed due to co-morbidities. [] Plan just implemented, too soon to assess goals progression <30days   [] Goals require adjustment due to lack of progress  [] Patient is not progressing as expected and requires additional follow up with physician  [] Other    Prognosis for POC: [x] Good [] Fair  [] Poor    Patient requires continued skilled intervention: [x] Yes  [] No        PLAN:   [x] Continue per plan of care [] Alter current plan (see comments)  [] Plan of care initiated [] Hold pending MD visit [] Discharge    Electronically signed by: Robles Huynh, PT, DPT, MS, SCS    Note: If patient does not return for scheduled/recommended follow up visits, this note will serve as a discharge from care along with the most recent update on progress.

## 2022-01-05 ENCOUNTER — HOSPITAL ENCOUNTER (OUTPATIENT)
Dept: PHYSICAL THERAPY | Age: 62
Setting detail: THERAPIES SERIES
Discharge: HOME | End: 2022-01-05
Payer: COMMERCIAL

## 2022-01-05 PROCEDURE — 97112 NEUROMUSCULAR REEDUCATION: CPT

## 2022-01-05 PROCEDURE — 97110 THERAPEUTIC EXERCISES: CPT

## 2022-01-05 PROCEDURE — 97016 VASOPNEUMATIC DEVICE THERAPY: CPT

## 2022-01-05 PROCEDURE — 97140 MANUAL THERAPY 1/> REGIONS: CPT

## 2022-01-05 NOTE — PROGRESS NOTES
12/29/2021     Interval History:  Status post right posterior medial meniscus root repair on 9/17/2021  Status post left posterior medial meniscus root repair on 12/17/21    The patient returns for postop evaluation. Overall she is doing well. She has no major concerns. She has been nonweightbearing on the left side. No fever or chills. Objective:  Ht 5' 9\" (1.753 m)   Wt 245 lb (111.1 kg)   BMI 36.18 kg/m²      Physical Exam:  Examination of the left knee   There is a + effusion, no gross deformity or skin changes  Range of motion reveals 0 to 100  neg lachman, negative posterior drawer, no pain or laxity with varus or valgus stress at 0 degrees and 30 degrees of flexion  + medial joint line tenderness  5 out of 5 strength throughout distal muscle groups  Sensation is intact to light touch throughout all distributions  There is no calf swelling or tenderness  Palpable DP pulse, brisk cap refill, 2+ symmetric reflexes    Imaging:  AP and lateral x-ray of the left knee obtained in the office today on 12/29/2021 were reviewed. Postoperative changes consistent with meniscus root repair with cortical button along the lateral proximal tibia. There is no fracture or dislocation. No other abnormality. Assessment:  Status post right posterior medial meniscus root repair on 9/17/2021  Status post left posterior medial meniscus root repair on 12/17/21    Plan:  Overall the patient is doing well. Continue nonweightbearing left lower extremity. Continue physical therapy. Return in 4 weeks for repeat evaluation. Disclaimer: This note was dictated with voice recognition software. Though review and correction are routine, we apologize for any errors.

## 2022-01-05 NOTE — FLOWSHEET NOTE
[address] [facility]    Physical Therapy Treatment Note/ Progress Report:     Date:  2022    Patient Name:  Josse Padilla    :  1960  MRN: [id number]  Medical/Treatment Diagnosis Information:  · Diagnosis: M23.306 Other meniscus derangements, unspecified meniscus, R knee; M25.561 R knee pain  · Treatment Diagnosis: M23.306 Other meniscus derangements, unspecified meniscus, R knee; M25.561 R knee pain  Insurance/Certification information:  PT Insurance Information: [facility]  Physician Information:  Referring Practitioner: Destiney Gaxiola  Plan of care signed (Y/N):     Date of Patient follow up with Physician: 21     Progress Report: []  Yes  [x]  No     Functional Scale: 64% disability - LEFS ()   Date: 21    Date Range for reporting period:  Beginnin21  Endin days or 10 visits    Progress report due (10 Rx/or 30 days whichever is less): 3/93/87    Recertification due (POC duration/ or 90 days whichever is less): 22     Visit # Insurance Allowable Auth Needed   34  (5 post-op for L knee) [facility] []Yes    [x]No     Pain level:  1-2/10 at start of session; 9/10 at worst.      SUBJECTIVE:   Doing well, just anxious to start weight bearing on her L leg. Ready to feel like she can move around normally. OBJECTIVE: 21   Observation:    Test measurements:    HIP Abd      HIP Ext       HIP IR       HIP ER       Knee ext 0 -3 lacking  Poor quad act noted. Knee Flex 124 85 p!    Ankle PF       Ankle DF       Ankle In       Ankle Ev       Strength  LEFT RIGHT   HIP Flexors  4-/5 4-/5    HIP Abductors 4-/5 4-/5   HIP Ext       Hip ER       Knee EXT (quad) TBA 4-/5    Knee Flex (HS) TBA 3/5   Ankle DF       Ankle PF       Ankle Inv       Ankle EV               Circumference  Mid apex  7 cm prox       48.0 cm  51.5 cm     45.5 cm  51.0 cm     Gait:  Patient came in using a wheelchair today; unable to use RW at this time due to some moderate achiness in the R knee after suture removal performed at the same time as the L knee meniscus repair. RESTRICTIONS/PRECAUTIONS: High BP. Previous tobacco use (5 per day). R knee scope, meniscus repair on 9/17/21. L knee scope, meniscus repair on 12/17/21. NWBing on the L for 6 weeks. Exercises/Interventions:     Therapeutic Exercise Resistance Sets/sec Reps Notes   Recumbent bike     Gastroc stretch w/strap Long sitting 30s 5    Hamstring stretch Long sitting 30s 5    Ankle pumps HEP   Heelslides w/strap Supine 10s 10    Quad sets  10s 10 Performed with NMES. See below. SAQ over bolster 5s 2x10    LAQ @EOB 0-90 2 10    SLR flexion  1 10 Cues to initiate each rep with QS first before raising. SLR abduction  2 10    SLR adduction 2#    [email or web address] LOP (184 mmHg) Blue cuff 30-15-15-15  SLR/sidelying hip abd/LAQ/standing hamstring curls           Leg press - 2 up SL down 60#                           Therapeutic Activities       Step ups/downs on 8\"  Cues to drive through heel, achieve TKE on R LE, slow control down during eccentric phase   TRX squat taps to chair Elevated x1 foam    Gait training w/RW - NWBing on L LE  Worked on coordination of RW and R LE to maintain NWBIng on L LE following surgery coming up this Friday (12/17)                 Manual Intervention       R patella mobs - inferior, medial/lateral - supine, EOB   6 min Gr. I-II   R knee PROM - supine, EOB   6 min                                NMR Re-education       NMES with SLR - L LE 50 mA  10 min 10 sec on/20 sec off  Went back to QS if fatigued with SLR. Bi[employer]x - maze tracing - easy, medium, and difficult skill levels lvl 8    CC TKE Resistance 4    Mod. BOSU lunge isos     Sport cord march (blue)  Each position; side/side   SLS balance on airex     Reverse lunge w/slider              Access Code: [email or web address]  URL: [email or web address]  Date: 10/06/2021  Prepared by: Isak Guaman    *In addition to pre-op exercises including hamstring stretch, gastroc stretch, heelslides, quad sets, SLR flexion. Therapeutic Exercise and NMR EXR  [x] (28976) Provided verbal/tactile cueing for activities related to strengthening, flexibility, endurance, ROM for improvements in LE, proximal hip, and core control with self care, mobility, lifting, ambulation. [x] (74604) Provided verbal/tactile cueing for activities related to improving balance, coordination, kinesthetic sense, posture, motor skill, proprioception  to assist with LE, proximal hip, and core control in self care, mobility, lifting, ambulation and eccentric single leg control.      NMR and Therapeutic Activities:    [x] (02460 or 31287) Provided verbal/tactile cueing for activities related to improving balance, coordination, kinesthetic sense, posture, motor skill, proprioception and motor activation to allow for proper function of core, proximal hip and LE with self care and ADLs  [x] (45622) Gait Re-education- Provided training and instruction to the patient for proper LE, core and proximal hip recruitment and positioning and eccentric body weight control with ambulation re-education including up and down stairs     Home Exercise Program:    [x] (12092) Reviewed/Progressed HEP activities related to strengthening, flexibility, endurance, ROM of core, proximal hip and LE for functional self-care, mobility, lifting and ambulation/stair navigation   [x] (14876)Reviewed/Progressed HEP activities related to improving balance, coordination, kinesthetic sense, posture, motor skill, proprioception of core, proximal hip and LE for self care, mobility, lifting, and ambulation/stair navigation      Manual Treatments:  PROM / STM / Oscillations-Mobs:  G-I, II, III, IV (PA's, Inf., Post.)  [x] (78294) Provided manual therapy to mobilize LE, proximal hip and/or LS spine soft tissue/joints for the purpose of modulating pain, promoting relaxation,  increasing ROM, reducing/eliminating soft tissue swelling/inflammation/restriction, improving soft tissue extensibility and allowing for proper ROM for normal function with self care, mobility, lifting and ambulation. Modalities:  Game Ready to R knee for pain/inflammation/edema - 15 minutes. 1/5    Charges:  Timed Code Treatment Minutes: 40   Total Treatment Minutes: 55       [] EVAL (LOW) 54168 (typically 20 minutes face-to-face)   [] EVAL (MOD) 83374 (typically 30 minutes face-to-face)  [] EVAL (HIGH) 89714 (typically 45 minutes face-to-face)  [] RE-EVAL     [x] LS(01798) x 1    [] IONTO (48860)  [x] NMR (47770) x 1   [x] VASO (20436)  [x] Manual (51176) x 1    [] Other:  [] TA (88970)x    [] Mech Traction (11278)  [] ES(attended) (90238)     [] ES (un) (74532):     GOALS:  Patient stated goal: Be able to do normal activities. [x] Progressing: [] Met: [] Not Met: [] Adjusted    Therapist goals for Patient:   Short Term Goals: To be achieved in: 2 weeks  1. Independent in HEP and progression per patient tolerance, in order to prevent re-injury. [] Progressing: [x] Met: [] Not Met: [] Adjusted  2. Patient will have a decrease in pain to facilitate improvement in movement, function, and ADLs as indicated by Functional Deficits. [] Progressing: [x] Met: [] Not Met: [] Adjusted     Long Term Goals: To be achieved in: 8 weeks  1. Disability index score of 20% or less for the LEFS to assist with reaching prior level of function. [x] Progressing: [] Met: [] Not Met: [] Adjusted   2. Patient will demonstrate increased AROM to 0-120 to allow for proper joint functioning as indicated by patients Functional Deficits. [x] Progressing: [] Met: [] Not Met: [] Adjusted    3. Patient will demonstrate an increase in strength to good proximal hip strength and control, within 5lb HHD in LE to allow for proper functional mobility as indicated by patients Functional Deficits. [x] Progressing: [] Met: [] Not Met: [] Adjusted   4. Patient will demonstrate normal gait mechanics without increased symptoms or restriction to allow him to walk and perform all daily mobility. [x] Progressing: [] Met: [] Not Met: [] Adjusted    5. Patient will be able to navigate stairs up/down with reciprocal gait mechanics without increased symptoms or restriction to allow him normal household mobility without restricted access to certain parts of his home. [x] Progressing: [] Met: [] Not Met: [] Adjusted           Progression Towards Functional goals:  [x] Patient is progressing as expected towards functional goals listed. [] Progression is slowed due to complexities listed. [] Progression has been slowed due to co-morbidities. [] Plan just implemented, too soon to assess goals progression  [] Other:     ASSESSMENT: Almost 3 weeks s/p. Continued focus of session on L knee ROM, general flexibility, and quad activation tasks maintaining NWBing status on L LE to protect healing tissue. Patient able to tolerate NMES with full set of flexion SLRs. Mild quad lag still noted especially as quad starts to fatigue. Able to initiate LAQ at EOB for further L quad strengthening. Planning to initiate BFR with quad sets at nv for advanced quad strengthening with low loads while still maintaining NWBing status.      Return to Play: (if applicable)   []  Stage 1: Intro to Strength   []  Stage 2: Return to Run and Strength   []  Stage 3: Return to Jump and Strength   []  Stage 4: Dynamic Strength and Agility   []  Stage 5: Sport Specific Training     []  Ready to Return to Play, Meets All Above Stages   []  Not Ready for Return to Sports   Comments:            Treatment/Activity Tolerance:  [x] Patient tolerated treatment well [] Patient limited by fatique  [] Patient limited by pain  [] Patient limited by other medical complications  [] Other:     Overall Progression Towards Functional goals/ Treatment Progress Update:  [x] Patient is progressing as expected towards functional goals listed. [] Progression is slowed due to complexities/Impairments listed. [] Progression has been slowed due to co-morbidities. [] Plan just implemented, too soon to assess goals progression <30days   [] Goals require adjustment due to lack of progress  [] Patient is not progressing as expected and requires additional follow up with physician  [] Other    Prognosis for POC: [x] Good [] Fair  [] Poor    Patient requires continued skilled intervention: [x] Yes  [] No        PLAN:   [x] Continue per plan of care [] Alter current plan (see comments)  [] Plan of care initiated [] Hold pending MD visit [] Discharge    Electronically signed by: Monie Anderson, PT, DPT, MS, SCS    Note: If patient does not return for scheduled/recommended follow up visits, this note will serve as a discharge from care along with the most recent update on progress.

## 2022-01-10 ENCOUNTER — HOSPITAL ENCOUNTER (OUTPATIENT)
Dept: PHYSICAL THERAPY | Age: 62
Setting detail: THERAPIES SERIES
Discharge: HOME | End: 2022-01-10
Payer: COMMERCIAL

## 2022-01-10 PROCEDURE — 97110 THERAPEUTIC EXERCISES: CPT

## 2022-01-10 PROCEDURE — 97016 VASOPNEUMATIC DEVICE THERAPY: CPT

## 2022-01-10 PROCEDURE — 97140 MANUAL THERAPY 1/> REGIONS: CPT

## 2022-01-10 NOTE — FLOWSHEET NOTE
[facility], [place]    Physical Therapy Treatment Note/ Progress Report:     Date:  1/10/2022    Patient Name:  Rabia Amos    :  1960  MRN: [id number]  Medical/Treatment Diagnosis Information:  · Diagnosis: M23.306 Other meniscus derangements, unspecified meniscus, R knee; M25.561 R knee pain  · Treatment Diagnosis: M23.306 Other meniscus derangements, unspecified meniscus, R knee; M25.561 R knee pain  Insurance/Certification information:  PT Insurance Information: [facility]  Physician Information:  Referring Practitioner: Viet Townsend  Plan of care signed (Y/N):     Date of Patient follow up with Physician: 22     Progress Report: []  Yes  [x]  No     Functional Scale: 64% disability - LEFS ()   Date: 21    Date Range for reporting period:  Beginnin21  Endin days or 10 visits    Progress report due (10 Rx/or 30 days whichever is less):     Recertification due (POC duration/ or 90 days whichever is less): 22     Visit # Insurance Allowable Auth Needed   35  (6 post-op for L knee) [facility] []Yes    [x]No     Pain level:  3-4/10 at start of session; 9/10 at worst.      SUBJECTIVE:   Rolled over in bed on Friday night while she had her knee brace on and had a catching, pinching pain on the front, inside of the L knee. Has been achy in the front, inside of both the R and L knee since then, especially when performing heelslides. OBJECTIVE: 21   Observation:    Test measurements:    HIP Abd      HIP Ext       HIP IR       HIP ER       Knee ext 0 -3 lacking  Poor quad act noted. Knee Flex 124 85 p!    Ankle PF       Ankle DF       Ankle In       Ankle Ev       Strength  LEFT RIGHT   HIP Flexors  4-/5 4-/5    HIP Abductors 4-/5 4-/5   HIP Ext       Hip ER       Knee EXT (quad) TBA 4-/5    Knee Flex (HS) TBA 3/5   Ankle DF       Ankle PF       Ankle Inv       Ankle EV               Circumference  Mid apex  7 cm prox       48.0 cm  51.5 cm     45.5 cm  51.0 cm     Gait:  Patient came in using a wheelchair today; unable to use RW at this time due to some moderate achiness in the R knee after suture removal performed at the same time as the L knee meniscus repair. RESTRICTIONS/PRECAUTIONS: High BP. Previous tobacco use (5 per day). R knee scope, meniscus repair on 9/17/21. L knee scope, meniscus repair on 12/17/21. NWBing on the L for 6 weeks. Exercises/Interventions:     Therapeutic Exercise Resistance Sets/sec Reps Notes   Recumbent bike     Gastroc stretch w/strap Long sitting 30s 5    Hamstring stretch Long sitting 30s 5    Ankle pumps HEP   Heelslides w/strap Supine 10s 10    Quad sets  10s 10 Performed with BFR. See below. SAQ over bolster 5s 2x10    LAQ @EOB 0-90 2 10    SLR flexion  2 10 Cues to initiate each rep with QS first before raising. SLR abduction  2 10    SLR adduction 2#    [email or web address] LOP (140 mmHg) Blue cuff 8 minSee below          Leg press - 2 up SL down 60#                           Therapeutic Activities       Step ups/downs on 8\"  Cues to drive through heel, achieve TKE on R LE, slow control down during eccentric phase   TRX squat taps to chair Elevated x1 foam    Gait training w/RW - NWBing on L LE  Worked on coordination of RW and R LE to maintain NWBIng on L LE following surgery coming up this Friday (12/17)                 Manual Intervention       R patella mobs - inferior, medial/lateral - supine, EOB   6 min Gr. I-II   R knee PROM - supine, EOB   6 min                                NMR Re-education       Biodex - maze tracing - easy, medium, and difficult skill levels lvl 8    CC TKE Resistance 4    Mod. BOSU lunge isos     Sport cord march (blue)  Each position; side/side   SLS balance on airex     Reverse lunge w/slider              Access Code: [email or web address]  URL: [email or web address]  Date: 10/06/2021  Prepared by: Pinky Andrade    Exercises    *In addition to pre-op exercises including hamstring stretch, gastroc stretch, heelslides, quad sets, SLR flexion. Spoke with Dr. Carito Carmen regarding the use of BFR with patient. Bloodflow restriction was utilized throughout exercise session with use of Emi Unit for a period of 8 minutes at  40% Occlusion. Patient's total limp occlusion pressure [facility] the entire time of occlusion. BFR Smart Phase Protocol                    BFR Session 1 (L knee) 1/10                       1 min rest period between each set of repetitions. 2 Min rest/reperfusion between    each exercises protocol perfromed                      BFR Locations:  Upper quad BFR set at: 140 mmHg                    Protocol: 30/15/15/15           Exercises:   Reps  Reps  Reps  Reps Notes                Quad sets  10 sec on/off # of reps required  1:30 Rest 1:30 Rest 1:30 Rest 1:30     completed  1:30 30 sec 1:30 30 sec 1:30 30 sec 1:30     reps required  30  15  15  15     completed             reps required  30  15  15  15     completed             reps required  30  15  15  15     completed                Therapeutic Exercise and NMR EXR  [x] (44675) Provided verbal/tactile cueing for activities related to strengthening, flexibility, endurance, ROM for improvements in LE, proximal hip, and core control with self care, mobility, lifting, ambulation. [x] (81975) Provided verbal/tactile cueing for activities related to improving balance, coordination, kinesthetic sense, posture, motor skill, proprioception  to assist with LE, proximal hip, and core control in self care, mobility, lifting, ambulation and eccentric single leg control.      NMR and Therapeutic Activities:    [x] (77191 or 24730) Provided verbal/tactile cueing for activities related to improving balance, coordination, kinesthetic sense, posture, motor skill, proprioception and motor activation to allow for proper function of core, proximal hip and LE with self care and ADLs  [x] (04484) Gait Re-education- Provided training and instruction to the patient for proper LE, core and proximal hip recruitment and positioning and eccentric body weight control with ambulation re-education including up and down stairs     Home Exercise Program:    [x] (04616) Reviewed/Progressed HEP activities related to strengthening, flexibility, endurance, ROM of core, proximal hip and LE for functional self-care, mobility, lifting and ambulation/stair navigation   [x] (07168)Reviewed/Progressed HEP activities related to improving balance, coordination, kinesthetic sense, posture, motor skill, proprioception of core, proximal hip and LE for self care, mobility, lifting, and ambulation/stair navigation      Manual Treatments:  PROM / STM / Oscillations-Mobs:  G-I, II, III, IV (PA's, Inf., Post.)  [x] (05201) Provided manual therapy to mobilize LE, proximal hip and/or LS spine soft tissue/joints for the purpose of modulating pain, promoting relaxation,  increasing ROM, reducing/eliminating soft tissue swelling/inflammation/restriction, improving soft tissue extensibility and allowing for proper ROM for normal function with self care, mobility, lifting and ambulation. Modalities:  Game Ready to R knee for pain/inflammation/edema - 15 minutes. 1/10    Charges:  Timed Code Treatment Minutes: 45   Total Treatment Minutes: 55       [] EVAL (LOW) 26678 (typically 20 minutes face-to-face)   [] EVAL (MOD) 78024 (typically 30 minutes face-to-face)  [] EVAL (HIGH) 81188 (typically 45 minutes face-to-face)  [] RE-EVAL     [x] NX(73519) x 2    [] IONTO (50708)  [] NMR (20606) x    [x] VASO (27396)  [x] Manual (49140) x 1    [] Other:  [] TA (60162)x    [] Mech Traction (90817)  [] ES(attended) (44999)     [] ES (un) (65472):     GOALS:  Patient stated goal: Be able to do normal activities. [x] Progressing: [] Met: [] Not Met: [] Adjusted    Therapist goals for Patient:   Short Term Goals:  To be achieved in: 2 weeks  1. Independent in HEP and progression per patient tolerance, in order to prevent re-injury. [] Progressing: [x] Met: [] Not Met: [] Adjusted  2. Patient will have a decrease in pain to facilitate improvement in movement, function, and ADLs as indicated by Functional Deficits. [] Progressing: [x] Met: [] Not Met: [] Adjusted     Long Term Goals: To be achieved in: 8 weeks  1. Disability index score of 20% or less for the LEFS to assist with reaching prior level of function. [x] Progressing: [] Met: [] Not Met: [] Adjusted   2. Patient will demonstrate increased AROM to 0-120 to allow for proper joint functioning as indicated by patients Functional Deficits. [x] Progressing: [] Met: [] Not Met: [] Adjusted    3. Patient will demonstrate an increase in strength to good proximal hip strength and control, within 5lb HHD in LE to allow for proper functional mobility as indicated by patients Functional Deficits. [x] Progressing: [] Met: [] Not Met: [] Adjusted   4. Patient will demonstrate normal gait mechanics without increased symptoms or restriction to allow him to walk and perform all daily mobility. [x] Progressing: [] Met: [] Not Met: [] Adjusted    5. Patient will be able to navigate stairs up/down with reciprocal gait mechanics without increased symptoms or restriction to allow him normal household mobility without restricted access to certain parts of his home. [x] Progressing: [] Met: [] Not Met: [] Adjusted           Progression Towards Functional goals:  [x] Patient is progressing as expected towards functional goals listed. [] Progression is slowed due to complexities listed. [] Progression has been slowed due to co-morbidities. [] Plan just implemented, too soon to assess goals progression  [] Other:     ASSESSMENT: 3 weeks s/p. Noted the anterior large incision on the shin was open today with some maceration noted underneath the bandaid.  Patient reports that the scab and steri strip fell off in the shower this past Saturday. Cleaned the incision site with rubbing alcohol to help dry it and then left it open during the session to help dry it and encourage new scab formation. Encouraged patient to leave it open to the air as much as possible. Initiated BFR with quad sets today to begin advanced quad strengthening on the L with low resistance/low loading maintaining NWBing restrictions. Tolerated well during session, but fatigued afterwards. Will f/u about any changes in pain or swelling or bruising at nv. Return to Play: (if applicable)   []  Stage 1: Intro to Strength   []  Stage 2: Return to Run and Strength   []  Stage 3: Return to Jump and Strength   []  Stage 4: Dynamic Strength and Agility   []  Stage 5: Sport Specific Training     []  Ready to Return to Play, Meets All Above Stages   []  Not Ready for Return to Sports   Comments:            Treatment/Activity Tolerance:  [x] Patient tolerated treatment well [] Patient limited by fatique  [] Patient limited by pain  [] Patient limited by other medical complications  [] Other:     Overall Progression Towards Functional goals/ Treatment Progress Update:  [x] Patient is progressing as expected towards functional goals listed. [] Progression is slowed due to complexities/Impairments listed. [] Progression has been slowed due to co-morbidities.   [] Plan just implemented, too soon to assess goals progression <30days   [] Goals require adjustment due to lack of progress  [] Patient is not progressing as expected and requires additional follow up with physician  [] Other    Prognosis for POC: [x] Good [] Fair  [] Poor    Patient requires continued skilled intervention: [x] Yes  [] No        PLAN:   [x] Continue per plan of care [] Alter current plan (see comments)  [] Plan of care initiated [] Hold pending MD visit [] Discharge    Electronically signed by: Sy Escalante, PT, DPT, MS, SCS    Note: If patient does not return for scheduled/recommended follow up visits, this note will serve as a discharge from care along with the most recent update on progress.

## 2022-01-12 ENCOUNTER — HOSPITAL ENCOUNTER (OUTPATIENT)
Dept: PHYSICAL THERAPY | Age: 62
Setting detail: THERAPIES SERIES
Discharge: HOME | End: 2022-01-12
Payer: COMMERCIAL

## 2022-01-12 PROCEDURE — 97110 THERAPEUTIC EXERCISES: CPT

## 2022-01-12 PROCEDURE — 97140 MANUAL THERAPY 1/> REGIONS: CPT

## 2022-01-12 NOTE — FLOWSHEET NOTE
[facility], [place]    Physical Therapy Treatment Note/ Progress Report:     Date:  2022    Patient Name:  João Medrano    :  1960  MRN: [id number]  Medical/Treatment Diagnosis Information:  · Diagnosis: M23.306 Other meniscus derangements, unspecified meniscus, R knee; M25.561 R knee pain  · Treatment Diagnosis: M23.306 Other meniscus derangements, unspecified meniscus, R knee; M25.561 R knee pain  Insurance/Certification information:  PT Insurance Information: [facility]  Physician Information:  Referring Practitioner: Romeo Cordova  Plan of care signed (Y/N):     Date of Patient follow up with Physician: 22     Progress Report: []  Yes  [x]  No     Functional Scale: 64% disability - LEFS ()   Date: 21    Date Range for reporting period:  Beginnin21  Endin days or 10 visits    Progress report due (10 Rx/or 30 days whichever is less):     Recertification due (POC duration/ or 90 days whichever is less): 22     Visit # Insurance Allowable Auth Needed   36  (7 post-op for L knee) [facility] []Yes    [x]No     Pain level:  1-2/10 at start of session; 9/10 at worst.      SUBJECTIVE:   R knee not as achy and sore today. No longer catches with heelslides performed at home, but still achy when she does them. Still feels some increased achiness after doing more activity up on her R leg such as transferring in/out of the wheelchair more throughout the day. OBJECTIVE: 21   Observation:    Test measurements:    HIP Abd      HIP Ext       HIP IR       HIP ER       Knee ext 0 -3 lacking  Poor quad act noted. Knee Flex 124 85 p!    Ankle PF       Ankle DF       Ankle In       Ankle Ev       Strength  LEFT RIGHT   HIP Flexors  4-/5 4-/5    HIP Abductors 4-/5 4-/5   HIP Ext       Hip ER       Knee EXT (quad) TBA 4-/5    Knee Flex (HS) TBA 3/5   Ankle DF       Ankle PF       Ankle Inv       Ankle EV Circumference  Mid apex  7 cm prox       48.0 cm  51.5 cm     45.5 cm  51.0 cm     Gait:  Patient came in using a wheelchair today; unable to use RW at this time due to some moderate achiness in the R knee after suture removal performed at the same time as the L knee meniscus repair. RESTRICTIONS/PRECAUTIONS: High BP. Previous tobacco use (5 per day). R knee scope, meniscus repair on 9/17/21. L knee scope, meniscus repair on 12/17/21. NWBing on the L for 6 weeks. Exercises/Interventions:     Therapeutic Exercise Resistance Sets/sec Reps Notes   Recumbent bike     Gastroc stretch w/strap Long sitting 30s 5    Hamstring stretch Long sitting 30s 5    Ankle pumps HEP   Heelslides w/strap Supine 10s 10    Quad sets  10s 10 Performed with BFR. See below. SAQ over bolster 5s 2x10    LAQ @EOB 0-90 2 10    SLR flexion  2 10 Cues to initiate each rep with QS first before raising. SLR abduction  2 10    SLR adduction 2#    [email or web address] LOP (140 mmHg) Blue cuff 8 minSee below          Leg press - 2 up SL down 60#                           Therapeutic Activities       Step ups/downs on 8\"  Cues to drive through heel, achieve TKE on R LE, slow control down during eccentric phase   TRX squat taps to chair Elevated x1 foam    Gait training w/RW - NWBing on L LE  Worked on coordination of RW and R LE to maintain NWBIng on L LE following surgery coming up this Friday (12/17)                 Manual Intervention       R patella mobs - inferior, medial/lateral - supine, EOB   6 min Gr. I-II   R knee PROM - supine, EOB   6 min                                NMR Re-education       Biodex - maze tracing - easy, medium, and difficult skill levels lvl 8    CC TKE Resistance 4    Mod. BOSU lunge isos     Sport cord march (blue)  Each position; side/side   SLS balance on airex     Reverse lunge w/slider              Access Code: [email or web address]  URL: [email or web address]  Date: 10/06/2021  Prepared by: Yuan Hinton Catia    Exercises    *In addition to pre-op exercises including hamstring stretch, gastroc stretch, heelslides, quad sets, SLR flexion. Spoke with Dr. Heather Napoles regarding the use of BFR with patient. Bloodflow restriction was utilized throughout exercise session with use of Emi Unit for a period of 8 minutes at  40% Occlusion. Patient's total limp occlusion pressure was calculated and [facility] for the entire time of occlusion. BFR Smart Phase Protocol                    BFR Session 1 (L knee) 1/12                       1 min rest period between each set of repetitions. 2 Min rest/reperfusion between    each exercises protocol perfromed                      BFR Locations:  Upper quad BFR set at: 140 mmHg                    Protocol: 30/15/15/15           Exercises:   Reps  Reps  Reps  Reps Notes                Quad sets  10 sec on/off # of reps required  1:30 Rest 1:30 Rest 1:30 Rest 1:30     completed  1:30 30 sec 1:30 30 sec 1:30 30 sec 1:30     reps required  30  15  15  15     completed             reps required  30  15  15  15     completed             reps required  30  15  15  15     completed                Therapeutic Exercise and NMR EXR  [x] (35798) Provided verbal/tactile cueing for activities related to strengthening, flexibility, endurance, ROM for improvements in LE, proximal hip, and core control with self care, mobility, lifting, ambulation. [x] (79809) Provided verbal/tactile cueing for activities related to improving balance, coordination, kinesthetic sense, posture, motor skill, proprioception  to assist with LE, proximal hip, and core control in self care, mobility, lifting, ambulation and eccentric single leg control.      NMR and Therapeutic Activities:    [x] (48262 or 13915) Provided verbal/tactile cueing for activities related to improving balance, coordination, kinesthetic sense, posture, motor skill, proprioception and motor activation to allow for proper function of core, proximal hip and LE with self care and ADLs  [x] (62602) Gait Re-education- Provided training and instruction to the patient for proper LE, core and proximal hip recruitment and positioning and eccentric body weight control with ambulation re-education including up and down stairs     Home Exercise Program:    [x] (57854) Reviewed/Progressed HEP activities related to strengthening, flexibility, endurance, ROM of core, proximal hip and LE for functional self-care, mobility, lifting and ambulation/stair navigation   [x] (37968)Reviewed/Progressed HEP activities related to improving balance, coordination, kinesthetic sense, posture, motor skill, proprioception of core, proximal hip and LE for self care, mobility, lifting, and ambulation/stair navigation      Manual Treatments:  PROM / STM / Oscillations-Mobs:  G-I, II, III, IV (PA's, Inf., Post.)  [x] (04893) Provided manual therapy to mobilize LE, proximal hip and/or LS spine soft tissue/joints for the purpose of modulating pain, promoting relaxation,  increasing ROM, reducing/eliminating soft tissue swelling/inflammation/restriction, improving soft tissue extensibility and allowing for proper ROM for normal function with self care, mobility, lifting and ambulation. Modalities:  Game Ready to R knee for pain/inflammation/edema - 10 minutes. 1/12    Charges:  Timed Code Treatment Minutes: 45   Total Treatment Minutes: 55       [] EVAL (LOW) 68420 (typically 20 minutes face-to-face)   [] EVAL (MOD) 51065 (typically 30 minutes face-to-face)  [] EVAL (HIGH) 46833 (typically 45 minutes face-to-face)  [] RE-EVAL     [x] AD(03708) x 2    [] IONTO (47197)  [] NMR (44380) x    [x] VASO (16825)  [x] Manual (45700) x 1    [] Other:  [] TA (93750)x    [] Mech Traction (57506)  [] ES(attended) (79966)     [] ES (un) (81038):     GOALS:  Patient stated goal: Be able to do normal activities.   [x] Progressing: [] Met: [] Not Met: [] quad sets today for continued progression of NWBing quad strengthening with low resistance loads. Plan to progress BFR to other [employer] tasks like flexion SLRs as tolerated until able to begin WBing progression. Return to Play: (if applicable)   []  Stage 1: Intro to Strength   []  Stage 2: Return to Run and Strength   []  Stage 3: Return to Jump and Strength   []  Stage 4: Dynamic Strength and Agility   []  Stage 5: Sport Specific Training     []  Ready to Return to Play, Meets All Above Stages   []  Not Ready for Return to Sports   Comments:            Treatment/Activity Tolerance:  [x] Patient tolerated treatment well [] Patient limited by fatique  [] Patient limited by pain  [] Patient limited by other medical complications  [] Other:     Overall Progression Towards Functional goals/ Treatment Progress Update:  [x] Patient is progressing as expected towards functional goals listed. [] Progression is slowed due to complexities/Impairments listed. [] Progression has been slowed due to co-morbidities. [] Plan just implemented, too soon to assess goals progression <30days   [] Goals require adjustment due to lack of progress  [] Patient is not progressing as expected and requires additional follow up with physician  [] Other    Prognosis for POC: [x] Good [] Fair  [] Poor    Patient requires continued skilled intervention: [x] Yes  [] No        PLAN:   [x] Continue per plan of care [] Alter current plan (see comments)  [] Plan of care initiated [] Hold pending MD visit [] Discharge    Electronically signed by: Jagdish Devine, PT, DPT, MS, SCS    Note: If patient does not return for scheduled/recommended follow up visits, this note will serve as a discharge from care along with the most recent update on progress.

## 2022-01-17 ENCOUNTER — HOSPITAL ENCOUNTER (OUTPATIENT)
Dept: PHYSICAL THERAPY | Age: 62
Setting detail: THERAPIES SERIES
Discharge: HOME | End: 2022-01-17
Payer: COMMERCIAL

## 2022-01-17 PROCEDURE — 97110 THERAPEUTIC EXERCISES: CPT

## 2022-01-17 PROCEDURE — 97140 MANUAL THERAPY 1/> REGIONS: CPT

## 2022-01-17 NOTE — FLOWSHEET NOTE
[facility], [place]    Physical Therapy Treatment Note/ Progress Report:     Date:  2022    Patient Name:  Austin Contreras    :  1960  MRN: [id number]  Medical/Treatment Diagnosis Information:  · Diagnosis: M23.306 Other meniscus derangements, unspecified meniscus, R knee; M25.561 R knee pain  · Treatment Diagnosis: M23.306 Other meniscus derangements, unspecified meniscus, R knee; M25.561 R knee pain  Insurance/Certification information:  PT Insurance Information: [facility]  Physician Information:  Referring Practitioner: Laura Knox  Plan of care signed (Y/N):     Date of Patient follow up with Physician: 22     Progress Report: []  Yes  [x]  No     Functional Scale: 64% disability - LEFS ()   Date: 21    Date Range for reporting period:  Beginnin21  Endin days or 10 visits    Progress report due (10 Rx/or 30 days whichever is less):     Recertification due (POC duration/ or 90 days whichever is less): 22     Visit # Insurance Allowable Auth Needed   37  (8 post-op for L knee) [facility] []Yes    [x]No     Pain level:  1-2/10 at start of session; 9/10 at worst.      SUBJECTIVE:   Reports that today is her last day on Xarelto. Goal is to be able to do a full set of stairs by  because she and her  have to move upstairs in their home so that the azael on the bottom level of their home can be replaced. Patient also wants to be ready to travel to [place] to visit her mother-in-law in March. OBJECTIVE: 21   Observation:    Test measurements:    HIP Abd      HIP Ext       HIP IR       HIP ER       Knee ext 0 -3 lacking  Poor quad act noted. Knee Flex 124 85 p!    Ankle PF       Ankle DF       Ankle In       Ankle Ev       Strength  LEFT RIGHT   HIP Flexors  4-/5 4-/5    HIP Abductors 4-/5 4-/5   HIP Ext       Hip ER       Knee EXT (quad) TBA 4-/5    Knee Flex (HS) TBA 3/5   Ankle DF Ankle PF       Ankle Inv       Ankle EV               Circumference  Mid apex  7 cm prox       48.0 cm  51.5 cm     45.5 cm  51.0 cm     Gait:  Patient came in using a wheelchair today; unable to use RW at this time due to some moderate achiness in the R knee after suture removal performed at the same time as the L knee meniscus repair. RESTRICTIONS/PRECAUTIONS: High BP. Previous tobacco use (5 per day). R knee scope, meniscus repair on 9/17/21. L knee scope, meniscus repair on 12/17/21. NWBing on the L for 6 weeks. Exercises/Interventions:     Therapeutic Exercise Resistance Sets/sec Reps Notes   Recumbent bike     Gastroc stretch w/strap Long sitting 30s 5    Hamstring stretch Long sitting 30s 5    Ankle pumps HEP   Heelslides w/strap Supine 10s 10    Quad sets     SAQ over bolster 5s 2x10    LAQ @EOB 0-90 2 10    SLR flexion  2 10 Performed with BFR. See below. SLR abduction  2 10    SLR adduction 2#    [email or web address] LOP (140 mmHg) Blue cuff 8 minSee below. Leg press - 2 up SL down 60#                           Therapeutic Activities       Step ups/downs on 8\"  Cues to drive through heel, achieve TKE on R LE, slow control down during eccentric phase   TRX squat taps to chair Elevated x1 foam    Gait training w/RW - NWBing on L LE  Worked on coordination of RW and R LE to maintain NWBIng on L LE following surgery coming up this Friday (12/17)                 Manual Intervention       R patella mobs - inferior, medial/lateral - supine, EOB   6 min Gr. I-II   R knee PROM - supine, EOB   6 min                                NMR Re-education       Biodex - maze tracing - easy, medium, and difficult skill levels lvl 8    CC TKE Resistance 4    Mod. BOSU lunge isos     Sport cord march (blue)  Each position; side/side   SLS balance on airex     Reverse lunge w/slider              Access Code: [email or web address]  URL: [email or web address]  Date: 10/06/2021  Prepared by: Mack Phillips    Exercises    *In addition to pre-op exercises including hamstring stretch, gastroc stretch, heelslides, quad sets, SLR flexion. Spoke with Dr. Yesenia Dixon regarding the use of BFR with patient. Bloodflow restriction was utilized throughout exercise session with use of Emi Unit for a period of 8 minutes at  40% Occlusion. Patient's total limp occlusion pressure [facility] the entire time of occlusion. BFR Smart Phase Protocol                    BFR Session 1 (L knee) 1/17                       1 min rest period between each set of repetitions. 2 Min rest/reperfusion between    each exercises protocol perfromed                      BFR Locations:  Upper quad BFR set at: 140 mmHg                    Protocol: 30/15/15/15           Exercises:   Reps  Reps  Reps  Reps Notes                SLR flexion # of reps required  30 Rest 15 Rest 15 Rest 15     completed  30 30 sec 15 30 sec 15 30 sec 15     reps required  30  15  15  15     completed             reps required  30  15  15  15     completed             reps required  30  15  15  15     completed                Therapeutic Exercise and NMR EXR  [x] (26506) Provided verbal/tactile cueing for activities related to strengthening, flexibility, endurance, ROM for improvements in LE, proximal hip, and core control with self care, mobility, lifting, ambulation. [x] (24215) Provided verbal/tactile cueing for activities related to improving balance, coordination, kinesthetic sense, posture, motor skill, proprioception  to assist with LE, proximal hip, and core control in self care, mobility, lifting, ambulation and eccentric single leg control.      NMR and Therapeutic Activities:    [x] (89056 or 04484) Provided verbal/tactile cueing for activities related to improving balance, coordination, kinesthetic sense, posture, motor skill, proprioception and motor activation to allow for proper function of core, proximal hip and LE with self care and ADLs  [x] (80310) Gait Re-education- Provided training and instruction to the patient for proper LE, core and proximal hip recruitment and positioning and eccentric body weight control with ambulation re-education including up and down stairs     Home Exercise Program:    [x] (35587) Reviewed/Progressed HEP activities related to strengthening, flexibility, endurance, ROM of core, proximal hip and LE for functional self-care, mobility, lifting and ambulation/stair navigation   [x] (14146)Reviewed/Progressed HEP activities related to improving balance, coordination, kinesthetic sense, posture, motor skill, proprioception of core, proximal hip and LE for self care, mobility, lifting, and ambulation/stair navigation      Manual Treatments:  PROM / STM / Oscillations-Mobs:  G-I, II, III, IV (PA's, Inf., Post.)  [x] (53886) Provided manual therapy to mobilize LE, proximal hip and/or LS spine soft tissue/joints for the purpose of modulating pain, promoting relaxation,  increasing ROM, reducing/eliminating soft tissue swelling/inflammation/restriction, improving soft tissue extensibility and allowing for proper ROM for normal function with self care, mobility, lifting and ambulation. Modalities:   Deferred. Will ice at home. 1/17    Charges:  Timed Code Treatment Minutes: 40   Total Treatment Minutes: 40       [] EVAL (LOW) 66996 (typically 20 minutes face-to-face)   [] EVAL (MOD) 90565 (typically 30 minutes face-to-face)  [] EVAL (HIGH) 39279 (typically 45 minutes face-to-face)  [] RE-EVAL     [x] SE(46098) x 2    [] IONTO (63004)  [] NMR (55353) x    [] VASO (55449)  [x] Manual (18187) x 1    [] Other:  [] TA (03804)x    [] Mech Traction (54351)  [] ES(attended) (83859)     [] ES (un) (15776):     GOALS:  Patient stated goal: Be able to do normal activities. [x] Progressing: [] Met: [] Not Met: [] Adjusted    Therapist goals for Patient:   Short Term Goals: To be achieved in: 2 weeks  1.  Independent in HEP and progression per patient tolerance, in order to prevent re-injury. [] Progressing: [x] Met: [] Not Met: [] Adjusted  2. Patient will have a decrease in pain to facilitate improvement in movement, function, and ADLs as indicated by Functional Deficits. [] Progressing: [x] Met: [] Not Met: [] Adjusted     Long Term Goals: To be achieved in: 8 weeks  1. Disability index score of 20% or less for the LEFS to assist with reaching prior level of function. [x] Progressing: [] Met: [] Not Met: [] Adjusted   2. Patient will demonstrate increased AROM to 0-120 to allow for proper joint functioning as indicated by patients Functional Deficits. [x] Progressing: [] Met: [] Not Met: [] Adjusted    3. Patient will demonstrate an increase in strength to good proximal hip strength and control, within 5lb HHD in LE to allow for proper functional mobility as indicated by patients Functional Deficits. [x] Progressing: [] Met: [] Not Met: [] Adjusted   4. Patient will demonstrate normal gait mechanics without increased symptoms or restriction to allow him to walk and perform all daily mobility. [x] Progressing: [] Met: [] Not Met: [] Adjusted    5. Patient will be able to navigate stairs up/down with reciprocal gait mechanics without increased symptoms or restriction to allow him normal household mobility without restricted access to certain parts of his home. [x] Progressing: [] Met: [] Not Met: [] Adjusted           Progression Towards Functional goals:  [x] Patient is progressing as expected towards functional goals listed. [] Progression is slowed due to complexities listed. [] Progression has been slowed due to co-morbidities. [] Plan just implemented, too soon to assess goals progression  [] Other:     ASSESSMENT: 4 weeks s/p. Progressed BFR to performance with flexion SLRs today for continued progression of NWBing quad strengthening with low resistance loads.      Return to Play: (if applicable)   []  Stage 1: Intro to Strength   []  Stage 2: Return to Run and Strength   []  Stage 3: Return to Jump and Strength   []  Stage 4: Dynamic Strength and Agility   []  Stage 5: Sport Specific Training     []  Ready to Return to Play, Meets All Above Stages   []  Not Ready for Return to Sports   Comments:            Treatment/Activity Tolerance:  [x] Patient tolerated treatment well [] Patient limited by fatique  [] Patient limited by pain  [] Patient limited by other medical complications  [] Other:     Overall Progression Towards Functional goals/ Treatment Progress Update:  [x] Patient is progressing as expected towards functional goals listed. [] Progression is slowed due to complexities/Impairments listed. [] Progression has been slowed due to co-morbidities. [] Plan just implemented, too soon to assess goals progression <30days   [] Goals require adjustment due to lack of progress  [] Patient is not progressing as expected and requires additional follow up with physician  [] Other    Prognosis for POC: [x] Good [] Fair  [] Poor    Patient requires continued skilled intervention: [x] Yes  [] No        PLAN:   [x] Continue per plan of care [] Alter current plan (see comments)  [] Plan of care initiated [] Hold pending MD visit [] Discharge    Electronically signed by: Monie Anderson, PT, DPT, MS, SCS    Note: If patient does not return for scheduled/recommended follow up visits, this note will serve as a discharge from care along with the most recent update on progress.

## 2022-01-19 ENCOUNTER — HOSPITAL ENCOUNTER (OUTPATIENT)
Dept: PHYSICAL THERAPY | Age: 62
Setting detail: THERAPIES SERIES
Discharge: HOME | End: 2022-01-19
Payer: COMMERCIAL

## 2022-01-19 PROCEDURE — 97110 THERAPEUTIC EXERCISES: CPT

## 2022-01-19 PROCEDURE — 97140 MANUAL THERAPY 1/> REGIONS: CPT

## 2022-01-19 NOTE — FLOWSHEET NOTE
[facility], [place]    Physical Therapy Treatment Note/ Progress Report:     Date:  2022    Patient Name:  Antoni Escalante    :  1960  MRN: [id number]  Medical/Treatment Diagnosis Information:  · Diagnosis: M23.306 Other meniscus derangements, unspecified meniscus, R knee; M25.561 R knee pain  · Treatment Diagnosis: M23.306 Other meniscus derangements, unspecified meniscus, R knee; M25.561 R knee pain  Insurance/Certification information:  PT Insurance Information: [facility]  Physician Information:  Referring Practitioner: Maris Hobson  Plan of care signed (Y/N):     Date of Patient follow up with Physician: 22     Progress Report: []  Yes  [x]  No     Functional Scale: 64% disability - LEFS ()   Date: 21    Date Range for reporting period:  Beginnin21  Endin days or 10 visits    Progress report due (10 Rx/or 30 days whichever is less):     Recertification due (POC duration/ or 90 days whichever is less): 22     Visit # Insurance Allowable Auth Needed   38  (9 post-op for L knee) [facility] []Yes    [x]No     Pain level:  1-2/10 at start of session; 9/10 at worst.      SUBJECTIVE:   Reports that today is her last day on Xarelto. Goal is to be able to do a full set of stairs by  because she and her  have to move upstairs in their home so that the azael on the bottom level of their home can be replaced. Patient also wants to be ready to travel to [place] to visit her mother-in-law in March. OBJECTIVE: 21   Observation:    Test measurements:    HIP Abd      HIP Ext       HIP IR       HIP ER       Knee ext 0 -3 lacking  Poor quad act noted. Knee Flex 124 85 p!    Ankle PF       Ankle DF       Ankle In       Ankle Ev       Strength  LEFT RIGHT   HIP Flexors  4-/5 4-/5    HIP Abductors 4-/5 4-/5   HIP Ext       Hip ER       Knee EXT (quad) TBA 4-/5    Knee Flex (HS) TBA 3/5   Ankle DF Ankle PF       Ankle Inv       Ankle EV               Circumference  Mid apex  7 cm prox       48.0 cm  51.5 cm     45.5 cm  51.0 cm     Gait:  Patient came in using a wheelchair today; unable to use RW at this time due to some moderate achiness in the R knee after suture removal performed at the same time as the L knee meniscus repair. RESTRICTIONS/PRECAUTIONS: High BP. Previous tobacco use (5 per day). R knee scope, meniscus repair on 9/17/21. L knee scope, meniscus repair on 12/17/21. NWBing on the L for 6 weeks. Exercises/Interventions:     Therapeutic Exercise Resistance Sets/sec Reps Notes   Recumbent bike     Gastroc stretch w/strap Long sitting 30s 5    Hamstring stretch Long sitting 30s 5    Ankle pumps HEP   Heelslides w/strap Supine 10s 10    Quad sets     SAQ over bolster 5s 2x10    LAQ @EOB 0-90 2 10    SLR flexion  2 10 Performed with BFR. See below. SLR abduction  2 10    SLR adduction 2#    [email or web address] LOP (150 mmHg) Blue cuff 8 minSee below. Leg press - 2 up SL down 60#                           Therapeutic Activities       Step ups/downs on 8\"  Cues to drive through heel, achieve TKE on R LE, slow control down during eccentric phase   TRX squat taps to chair Elevated x1 foam    Gait training w/RW - NWBing on L LE  Worked on coordination of RW and R LE to maintain NWBIng on L LE following surgery coming up this Friday (12/17)                 Manual Intervention       R patella mobs - inferior, medial/lateral - supine, EOB   6 min Gr. I-II   R knee PROM - supine, EOB   6 min                                NMR Re-education       Biodex - maze tracing - easy, medium, and difficult skill levels lvl 8    CC TKE Resistance 4    Mod. BOSU lunge isos     Sport cord march (blue)  Each position; side/side   SLS balance on airex     Reverse lunge w/slider              Access Code: [email or web address]  URL: [email or web address]  Date: 10/06/2021  Prepared by: Pauline Bedolla    Exercises    *In addition to pre-op exercises including hamstring stretch, gastroc stretch, heelslides, quad sets, SLR flexion. Spoke with Dr. Jonathan Mcnulty regarding the use of BFR with patient. Bloodflow restriction was utilized throughout exercise session with use of Emi Unit for a period of 8 minutes at  40% Occlusion. Patient's total limp occlusion pressure [facility] the entire time of occlusion. BFR Smart Phase Protocol                    BFR Session 1 (L knee) 1/19                       1 min rest period between each set of repetitions. 2 Min rest/reperfusion between    each exercises protocol perfromed                      BFR Locations:  Upper quad BFR set at: 140 mmHg                    Protocol: 30/15/15/15           Exercises:   Reps  Reps  Reps  Reps Notes                SLR flexion # of reps required  30 Rest 15 Rest 15 Rest 15     completed  30 30 sec 15 30 sec 15 30 sec 15    Add LAQ or sidelying hip abd nv reps required  30  15  15  15     completed             reps required  30  15  15  15     completed             reps required  30  15  15  15     completed                Therapeutic Exercise and NMR EXR  [x] (94698) Provided verbal/tactile cueing for activities related to strengthening, flexibility, endurance, ROM for improvements in LE, proximal hip, and core control with self care, mobility, lifting, ambulation. [x] (39272) Provided verbal/tactile cueing for activities related to improving balance, coordination, kinesthetic sense, posture, motor skill, proprioception  to assist with LE, proximal hip, and core control in self care, mobility, lifting, ambulation and eccentric single leg control.      NMR and Therapeutic Activities:    [x] (54290 or 41317) Provided verbal/tactile cueing for activities related to improving balance, coordination, kinesthetic sense, posture, motor skill, proprioception and motor activation to allow for proper function of core, proximal hip and LE with self care and ADLs  [x] (01320) Gait Re-education- Provided training and instruction to the patient for proper LE, core and proximal hip recruitment and positioning and eccentric body weight control with ambulation re-education including up and down stairs     Home Exercise Program:    [x] (93233) Reviewed/Progressed HEP activities related to strengthening, flexibility, endurance, ROM of core, proximal hip and LE for functional self-care, mobility, lifting and ambulation/stair navigation   [x] (58075)Reviewed/Progressed HEP activities related to improving balance, coordination, kinesthetic sense, posture, motor skill, proprioception of core, proximal hip and LE for self care, mobility, lifting, and ambulation/stair navigation      Manual Treatments:  PROM / STM / Oscillations-Mobs:  G-I, II, III, IV (PA's, Inf., Post.)  [x] (24473) Provided manual therapy to mobilize LE, proximal hip and/or LS spine soft tissue/joints for the purpose of modulating pain, promoting relaxation,  increasing ROM, reducing/eliminating soft tissue swelling/inflammation/restriction, improving soft tissue extensibility and allowing for proper ROM for normal function with self care, mobility, lifting and ambulation. Modalities:   Deferred. Will ice at home. 1/19    Charges:  Timed Code Treatment Minutes: 40   Total Treatment Minutes: 40       [] EVAL (LOW) 74844 (typically 20 minutes face-to-face)   [] EVAL (MOD) 65219 (typically 30 minutes face-to-face)  [] EVAL (HIGH) 28214 (typically 45 minutes face-to-face)  [] RE-EVAL     [x] VO(89521) x 2    [] IONTO (78315)  [] NMR (30123) x    [] VASO (84025)  [x] Manual (55817) x 1    [] Other:  [] TA (41590)x    [] Mech Traction (43236)  [] ES(attended) (16888)     [] ES (un) (55971):     GOALS:  Patient stated goal: Be able to do normal activities. [x] Progressing: [] Met: [] Not Met: [] Adjusted    Therapist goals for Patient:   Short Term Goals:  To be achieved in: 2 weeks  1. Independent in HEP and progression per patient tolerance, in order to prevent re-injury. [] Progressing: [x] Met: [] Not Met: [] Adjusted  2. Patient will have a decrease in pain to facilitate improvement in movement, function, and ADLs as indicated by Functional Deficits. [] Progressing: [x] Met: [] Not Met: [] Adjusted     Long Term Goals: To be achieved in: 8 weeks  1. Disability index score of 20% or less for the LEFS to assist with reaching prior level of function. [x] Progressing: [] Met: [] Not Met: [] Adjusted   2. Patient will demonstrate increased AROM to 0-120 to allow for proper joint functioning as indicated by patients Functional Deficits. [x] Progressing: [] Met: [] Not Met: [] Adjusted    3. Patient will demonstrate an increase in strength to good proximal hip strength and control, within 5lb HHD in LE to allow for proper functional mobility as indicated by patients Functional Deficits. [x] Progressing: [] Met: [] Not Met: [] Adjusted   4. Patient will demonstrate normal gait mechanics without increased symptoms or restriction to allow him to walk and perform all daily mobility. [x] Progressing: [] Met: [] Not Met: [] Adjusted    5. Patient will be able to navigate stairs up/down with reciprocal gait mechanics without increased symptoms or restriction to allow him normal household mobility without restricted access to certain parts of his home. [x] Progressing: [] Met: [] Not Met: [] Adjusted           Progression Towards Functional goals:  [x] Patient is progressing as expected towards functional goals listed. [] Progression is slowed due to complexities listed. [] Progression has been slowed due to co-morbidities. [] Plan just implemented, too soon to assess goals progression  [] Other:     ASSESSMENT: Almost 5 weeks s/p. Patient tolerated BFR progression well at last visits.  Plan to progress BFR again next visit to include either LAQ or sidelying hip abduction for continued L LE strengthening at low loads maintaining NWBing status. Patietn to begin [address] at the end of next week (6 weeks post-op). Return to Play: (if applicable)   []  Stage 1: Intro to Strength   []  Stage 2: Return to Run and Strength   []  Stage 3: Return to Jump and Strength   []  Stage 4: Dynamic Strength and Agility   []  Stage 5: Sport Specific Training     []  Ready to Return to Play, Meets All Above Stages   []  Not Ready for Return to Sports   Comments:            Treatment/Activity Tolerance:  [x] Patient tolerated treatment well [] Patient limited by fatique  [] Patient limited by pain  [] Patient limited by other medical complications  [] Other:     Overall Progression Towards Functional goals/ Treatment Progress Update:  [x] Patient is progressing as expected towards functional goals listed. [] Progression is slowed due to complexities/Impairments listed. [] Progression has been slowed due to co-morbidities. [] Plan just implemented, too soon to assess goals progression <30days   [] Goals require adjustment due to lack of progress  [] Patient is not progressing as expected and requires additional follow up with physician  [] Other    Prognosis for POC: [x] Good [] Fair  [] Poor    Patient requires continued skilled intervention: [x] Yes  [] No        PLAN:   [x] Continue per plan of care [] Alter current plan (see comments)  [] Plan of care initiated [] Hold pending MD visit [] Discharge    Electronically signed by: Nano Valdez, PT, DPT, MS, SCS    Note: If patient does not return for scheduled/recommended follow up visits, this note will serve as a discharge from care along with the most recent update on progress.

## 2022-01-24 ENCOUNTER — HOSPITAL ENCOUNTER (OUTPATIENT)
Dept: PHYSICAL THERAPY | Age: 62
Setting detail: THERAPIES SERIES
Discharge: HOME | End: 2022-01-24
Payer: COMMERCIAL

## 2022-01-24 PROCEDURE — 97140 MANUAL THERAPY 1/> REGIONS: CPT

## 2022-01-24 PROCEDURE — 97110 THERAPEUTIC EXERCISES: CPT

## 2022-01-24 NOTE — FLOWSHEET NOTE
[facility]    Physical Therapy Treatment Note/ Progress Report:     Date:  2022    Patient Name:  Rabia Amos    :  1960  MRN: [id number]  Medical/Treatment Diagnosis Information:  · Diagnosis: M23.306 Other meniscus derangements, unspecified meniscus, R knee; M25.561 R knee pain  · Treatment Diagnosis: M23.306 Other meniscus derangements, unspecified meniscus, R knee; M25.561 R knee pain  Insurance/Certification information:  PT Insurance Information: [facility]  Physician Information:  Referring Practitioner: Viet Townsend  Plan of care signed (Y/N):     Date of Patient follow up with Physician: 22     Progress Report: []  Yes  [x]  No     Functional Scale: 64% disability - LEFS ()   Date: 21    Date Range for reporting period:  Beginnin21  Endin days or 10 visits    Progress report due (10 Rx/or 30 days whichever is less): 22 Progress note nv. Recertification due (POC duration/ or 90 days whichever is less): 22     Visit # Insurance Allowable Auth Needed   44  (10 post-op for L knee) [facility] []Yes    [x]No     Pain level:  1-2/10 at start of session; 9/10 at worst.      SUBJECTIVE:   S/p 6 weeks at the end of this week. Patient excited to finally start weight bearing on her L. Goal is to be able to do a full set of stairs by  because she and her  have to move upstairs in their home so that the azael on the bottom level of their home can be replaced. Patient also wants to be ready to travel to [place] to visit her mother-in-law in March. OBJECTIVE: 21   Observation:    Test measurements:    HIP Abd      HIP Ext       HIP IR       HIP ER       Knee ext 0 -3 lacking  Poor quad act noted. Knee Flex 124 85 p!    Ankle PF       Ankle DF       Ankle In       Ankle Ev       Strength  LEFT RIGHT   HIP Flexors  4-/5 4-/5    HIP Abductors 4-/5 4-/5   HIP Ext       Hip ER Knee EXT (quad) TBA 4-/5    Knee Flex (HS) TBA 3/5   Ankle DF       Ankle PF       Ankle Inv       Ankle EV               Circumference  Mid apex  7 cm prox       48.0 cm  51.5 cm     45.5 cm  51.0 cm     Gait:  Patient came in using a wheelchair today; unable to use RW at this time due to some moderate achiness in the R knee after suture removal performed at the same time as the L knee meniscus repair. RESTRICTIONS/PRECAUTIONS: High BP. Previous tobacco use (5 per day). R knee scope, meniscus repair on 9/17/21. L knee scope, meniscus repair on 12/17/21. NWBing on the L for 6 weeks. Exercises/Interventions:     Therapeutic Exercise Resistance Sets/sec Reps Notes   Recumbent bike     Gastroc stretch w/strap Long sitting 30s 5    Hamstring stretch Long sitting 30s 5    Ankle pumps HEP   Heelslides w/strap Supine 10s 10    Quad sets     SAQ over bolster 5s 2x10    LAQ @EOB 0-90 2 10    SLR flexion  2 10 Performed with BFR. See below. SLR abduction  2 10 Performed with BFR. See below. SLR adduction 2#    [email or web address] LOP (150 mmHg) Blue cuff 20 minSee below. Leg press - 2 up SL down 60#                           Therapeutic Activities       Step ups/downs on 8\"  Cues to drive through heel, achieve TKE on R LE, slow control down during eccentric phase   TRX squat taps to chair Elevated x1 foam    Gait training w/RW - NWBing on L LE  Worked on coordination of RW and R LE to maintain NWBIng on L LE following surgery coming up this Friday (12/17)                 Manual Intervention       R patella mobs - inferior, medial/lateral - supine, EOB   6 min Gr. I-II   R knee PROM - supine, EOB   6 min                                NMR Re-education       [employer] - maze tracing - easy, medium, and difficult skill levels lvl 8    CC TKE Resistance 4    Mod.  BOSU lunge isos     Sport cord march (blue)  Each position; side/side   SLS balance on airex     Reverse lunge w/slider              Access Code: [email or web address]  URL: [email or web address]  Date: 10/06/2021  Prepared by: Conception Slates    Exercises    *In addition to pre-op exercises including hamstring stretch, gastroc stretch, heelslides, quad sets, SLR flexion. Spoke with Dr. Blas Sanders regarding the use of BFR with patient. Bloodflow restriction was utilized throughout exercise session with use of Emi Unit for a period of 8 minutes at  40% Occlusion. Patient's total limp occlusion pressure [facility] the entire time of occlusion. BFR Smart Phase Protocol                    BFR Session 1 (L knee) 1/24                       1 min rest period between each set of repetitions. 2 Min rest/reperfusion between    each exercises protocol perfromed                      BFR Locations:  Upper quad BFR set at: 150 mmHg                    Protocol: 30/15/15/15           Exercises:   Reps  Reps  Reps  Reps Notes                SLR flexion # of reps required  30 Rest 15 Rest 15 Rest 15     completed  30 30 sec 15 30 sec 15 30 sec 15    Sidelying hip abd reps required  30 30 sec 15 30 sec 15 30 sec 15     completed  30 30 sec 15 30 sec 15 30 sec 15     reps required  30  15  15  15     completed             reps required  30  15  15  15     completed                Therapeutic Exercise and NMR EXR  [x] (11306) Provided verbal/tactile cueing for activities related to strengthening, flexibility, endurance, ROM for improvements in LE, proximal hip, and core control with self care, mobility, lifting, ambulation. [x] (80332) Provided verbal/tactile cueing for activities related to improving balance, coordination, kinesthetic sense, posture, motor skill, proprioception  to assist with LE, proximal hip, and core control in self care, mobility, lifting, ambulation and eccentric single leg control.      NMR and Therapeutic Activities:    [x] (37194 or 63896) Provided verbal/tactile cueing for activities related to improving balance, coordination, kinesthetic sense, posture, motor skill, proprioception and motor activation to allow for proper function of core, proximal hip and LE with self care and ADLs  [x] (71187) Gait Re-education- Provided training and instruction to the patient for proper LE, core and proximal hip recruitment and positioning and eccentric body weight control with ambulation re-education including up and down stairs     Home Exercise Program:    [x] (95387) Reviewed/Progressed HEP activities related to strengthening, flexibility, endurance, ROM of core, proximal hip and LE for functional self-care, mobility, lifting and ambulation/stair navigation   [x] (78924)Reviewed/Progressed HEP activities related to improving balance, coordination, kinesthetic sense, posture, motor skill, proprioception of core, proximal hip and LE for self care, mobility, lifting, and ambulation/stair navigation      Manual Treatments:  PROM / STM / Oscillations-Mobs:  G-I, II, III, IV (PA's, Inf., Post.)  [x] (22019) Provided manual therapy to mobilize LE, proximal hip and/or LS spine soft tissue/joints for the purpose of modulating pain, promoting relaxation,  increasing ROM, reducing/eliminating soft tissue swelling/inflammation/restriction, improving soft tissue extensibility and allowing for proper ROM for normal function with self care, mobility, lifting and ambulation. Modalities:   Deferred. Will ice at home.   1/24    Charges:  Timed Code Treatment Minutes: 45   Total Treatment Minutes: 45       [] EVAL (LOW) 81964 (typically 20 minutes face-to-face)   [] EVAL (MOD) 14122 (typically 30 minutes face-to-face)  [] EVAL (HIGH) 11013 (typically 45 minutes face-to-face)  [] RE-EVAL     [x] GG(67859) x 2    [] IONTO (63096)  [] NMR (95058) x    [] VASO (75406)  [x] Manual (14259) x 1    [] Other:  [] TA (87941)x    [] Mech Traction (13871)  [] ES(attended) (04639)     [] ES (un) (62154):     GOALS:  Patient stated goal: Be able to do normal activities. [x] Progressing: [] Met: [] Not Met: [] Adjusted    Therapist goals for Patient:   Short Term Goals: To be achieved in: 2 weeks  1. Independent in HEP and progression per patient tolerance, in order to prevent re-injury. [] Progressing: [x] Met: [] Not Met: [] Adjusted  2. Patient will have a decrease in pain to facilitate improvement in movement, function, and ADLs as indicated by Functional Deficits. [] Progressing: [x] Met: [] Not Met: [] Adjusted     Long Term Goals: To be achieved in: 8 weeks  1. Disability index score of 20% or less for the LEFS to assist with reaching prior level of function. [x] Progressing: [] Met: [] Not Met: [] Adjusted   2. Patient will demonstrate increased AROM to 0-120 to allow for proper joint functioning as indicated by patients Functional Deficits. [x] Progressing: [] Met: [] Not Met: [] Adjusted    3. Patient will demonstrate an increase in strength to good proximal hip strength and control, within 5lb HHD in LE to allow for proper functional mobility as indicated by patients Functional Deficits. [x] Progressing: [] Met: [] Not Met: [] Adjusted   4. Patient will demonstrate normal gait mechanics without increased symptoms or restriction to allow him to walk and perform all daily mobility. [x] Progressing: [] Met: [] Not Met: [] Adjusted    5. Patient will be able to navigate stairs up/down with reciprocal gait mechanics without increased symptoms or restriction to allow him normal household mobility without restricted access to certain parts of his home. [x] Progressing: [] Met: [] Not Met: [] Adjusted           Progression Towards Functional goals:  [x] Patient is progressing as expected towards functional goals listed. [] Progression is slowed due to complexities listed. [] Progression has been slowed due to co-morbidities. [] Plan just implemented, too soon to assess goals progression  [] Other:     ASSESSMENT: 6 weeks s/p on Friday this week. Will be able to initiate weight bearing at that time. Progressed BFR to include a second 8 minute round of sidelying hip abduction today for advanced L LE strengthening while still maintaining NWBing status for this final week. Return to Play: (if applicable)   []  Stage 1: Intro to Strength   []  Stage 2: Return to Run and Strength   []  Stage 3: Return to Jump and Strength   []  Stage 4: Dynamic Strength and Agility   []  Stage 5: Sport Specific Training     []  Ready to Return to Play, Meets All Above Stages   []  Not Ready for Return to Sports   Comments:            Treatment/Activity Tolerance:  [x] Patient tolerated treatment well [] Patient limited by fatique  [] Patient limited by pain  [] Patient limited by other medical complications  [] Other:     Overall Progression Towards Functional goals/ Treatment Progress Update:  [x] Patient is progressing as expected towards functional goals listed. [] Progression is slowed due to complexities/Impairments listed. [] Progression has been slowed due to co-morbidities. [] Plan just implemented, too soon to assess goals progression <30days   [] Goals require adjustment due to lack of progress  [] Patient is not progressing as expected and requires additional follow up with physician  [] Other    Prognosis for POC: [x] Good [] Fair  [] Poor    Patient requires continued skilled intervention: [x] Yes  [] No        PLAN:   [x] Continue per plan of care [] Alter current plan (see comments)  [] Plan of care initiated [] Hold pending MD visit [] Discharge    Electronically signed by: Alpa Jones, PT, DPT, MS, SCS    Note: If patient does not return for scheduled/recommended follow up visits, this note will serve as a discharge from care along with the most recent update on progress.

## 2022-01-26 ENCOUNTER — OFFICE VISIT (OUTPATIENT)
Dept: ORTHOPEDIC SURGERY | Age: 62
End: 2022-01-26

## 2022-01-26 ENCOUNTER — HOSPITAL ENCOUNTER (OUTPATIENT)
Dept: PHYSICAL THERAPY | Age: 62
Setting detail: THERAPIES SERIES
Discharge: HOME | End: 2022-01-26
Payer: COMMERCIAL

## 2022-01-26 VITALS — HEIGHT: 69 IN | BODY MASS INDEX: 36.29 KG/M2 | WEIGHT: 245 LBS

## 2022-01-26 DIAGNOSIS — Z98.890 HISTORY OF MEDIAL MENISCUS REPAIR OF LEFT KNEE: Primary | ICD-10-CM

## 2022-01-26 PROCEDURE — 97140 MANUAL THERAPY 1/> REGIONS: CPT

## 2022-01-26 PROCEDURE — 97110 THERAPEUTIC EXERCISES: CPT

## 2022-01-26 PROCEDURE — 99024 POSTOP FOLLOW-UP VISIT: CPT | Performed by: ORTHOPAEDIC SURGERY

## 2022-01-26 NOTE — PLAN OF CARE
[facility] [place]      Physical Therapy Re-Certification Plan of Care/MD UPDATE      Dear Dr. Augusto Muir,    We had the pleasure of treating the following patient for physical therapy services at [address] [place] [address]. A summary of our findings can be found in the updated assessment below. This includes our plan of care. If you have any questions or concerns regarding these findings, please do not hesitate to contact me at the office phone number checked above. Thank you for the referral.     Physician Signature:________________________________Date:__________________  By signing above (or electronic signature), therapists plan is approved by physician    Date Range Of Visits: 12/20/21 to 1/26/22  Total Visits to Date: 36 (6 for L knee)  Overall Response to Treatment:   [x]Patient is responding well to treatment and improvement is noted with regards  to goals   []Patient should continue to improve in reasonable time if they continue HEP   []Patient has plateaued and is no longer responding to skilled PT intervention    []Patient is getting worse and would benefit from return to referring MD   []Patient unable to adhere to initial POC   [x]Other: 6 weeks s/p on Friday this week. Patient achieving 0-115 on the L knee without any overpressure. Patient demonstrating good quad activation on the L knee. Sees Dr. Anam Martinez for 6 week follow up today after PT session. Will be able to initiate weight bearing on Friday of this week. Progressed BFR again today to include a third 8 minute round of LAQ at edge of bed for advanced L LE strengthening while still maintaining NWBing status until cleared to initiate [address] with RW this Friday. Patient will continue to benefit from skilled PT for full, safe return to PLOF pain free and without limitations.     Physical Therapy Treatment Note/ Progress Report:     Date:  1/26/2022    Patient Name:  Freddy Lorenzana Kristal    :  1960  MRN: [id number]  Medical/Treatment Diagnosis Information:  · Diagnosis: M23.306 Other meniscus derangements, unspecified meniscus, R knee; M25.561 R knee pain  · Treatment Diagnosis: M23.306 Other meniscus derangements, unspecified meniscus, R knee; M25.561 R knee pain  Insurance/Certification information:  PT Insurance Information: [facility]  Physician Information:  Referring Practitioner: Javy Rizvi  Plan of care signed (Y/N):     Date of Patient follow up with Physician: 22     Progress Report: [x]  Yes  []  No     Functional Scale: 79% disability - LEFS ()   Date: 22    Date Range for reporting period:  Beginnin21  Endin days or 10 visits    Progress report due (10 Rx/or 30 days whichever is less):      Recertification due (POC duration/ or 90 days whichever is less): 22    Visit # Insurance Allowable Auth Needed   40  (11 post-op for L knee) [facility] []Yes    [x]No     Pain level:  0/10 at start of session; 9/10 at worst.      SUBJECTIVE:   S/p 6 weeks at the end of this week. Patient excited to finally start weight bearing on her L. Goal is to be able to do a full set of stairs by  because she and her  have to move upstairs in their home so that the azael on the bottom level of their home can be replaced. Patient also wants to be ready to travel to [place] to visit her mother-in-law in March.      OBJECTIVE: 22   Observation:    Test measurements:    HIP Abd      HIP Ext       HIP IR       HIP ER       Knee ext 0 0   Knee Flex 125 115   Ankle PF       Ankle DF       Ankle In       Ankle Ev       Strength  LEFT RIGHT   HIP Flexors  4-/5 4-/5    HIP Abductors 4-/5 4/5   HIP Ext       Hip ER       Knee EXT (quad) 4-/5 4+/5    Knee Flex (HS) 4-/5 4/5   Ankle DF       Ankle PF       Ankle Inv       Ankle EV               Circumference  Mid apex  7 cm prox       46.0 cm  51.0 cm     45.5 cm  51.0 cm     Gait:  Patient primarily using a wheelchair for longer distance mobility; using RW for short distance ambulation and transfers at home maintaining NWBing on L in accordance with surgical restrictions on the L LE.    RESTRICTIONS/PRECAUTIONS: High BP. Previous tobacco use (5 per day). R knee scope, meniscus repair on 9/17/21. L knee scope, meniscus repair on 12/17/21. NWBing on the L for 6 weeks. Exercises/Interventions:     Therapeutic Exercise Resistance Sets/sec Reps Notes   Recumbent bike     Gastroc stretch w/strap Long sitting 30s 5    Hamstring stretch Long sitting 30s 5    Ankle pumps HEP   Heelslides w/strap Supine 10s 10    Quad sets     SAQ over bolster 5s 2x10    LAQ @EOB 0-90 2 10 Performed with BFR. See below. SLR flexion  2 10 Performed with BFR. See below. SLR abduction  2 10 Performed with BFR. See below. SLR adduction 2#    [email or web address] LOP (150 mmHg) Blue cuff 24 minSee below. Leg press - 2 up SL down 60#                           Therapeutic Activities       Step ups/downs on 8\"  Cues to drive through heel, achieve TKE on R LE, slow control down during eccentric phase   TRX squat taps to chair Elevated x1 foam    Gait training w/RW - NWBing on L LE  Worked on coordination of RW and R LE to maintain NWBIng on L LE following surgery coming up this Friday (12/17)                 Manual Intervention       R patella mobs - inferior, medial/lateral - supine, EOB   6 min Gr. I-II   R knee PROM - supine, EOB   6 min                                NMR Re-education       Bi[employer] - maze tracing - easy, medium, and difficult skill levels lvl 8    CC TKE Resistance 4    Mod. BOSU lunge isos     Sport cord march (blue)  Each position; side/side   SLS balance on airex     Reverse lunge w/slider              Access Code: [email or web address]  URL: [email or web address]  Date: 10/06/2021  Prepared by: Alpa Jones    Exercises    *In addition to pre-op exercises including hamstring stretch, gastroc stretch, heelslides, quad sets, SLR flexion. Spoke with Dr. Pérez Antoine regarding the use of BFR with patient. Bloodflow restriction was utilized throughout exercise session with use of Em[facility] Unit for a period of 8 minutes at  40% Occlusion. Patient's total limp occlusion pressure [facility] the entire time of occlusion. BFR Smart Phase Protocol                    BFR Session 1 (L knee) 1/26                       1 min rest period between each set of repetitions. 2 Min rest/reperfusion between    each exercises protocol perfromed                      BFR Locations:  Upper quad BFR set at: 150 mmHg                    Protocol: 30/15/15/15           Exercises:   Reps  Reps  Reps  Reps Notes                SLR flexion # of reps required  30 Rest 15 Rest 15 Rest 15     completed  30 30 sec 15 30 sec 15 30 sec 15    Sidelying hip abd reps required  30 30 sec 15 30 sec 15 30 sec 15     completed  30 30 sec 15 30 sec 15 30 sec 15    LAQ at EOB reps required  30 30 sec 15 30 sec 15 30 sec 15     completed  30 30 sec 15 30 sec 15 30 sec 15     reps required  30  15  15  15     completed                Therapeutic Exercise and NMR EXR  [x] (57632) Provided verbal/tactile cueing for activities related to strengthening, flexibility, endurance, ROM for improvements in LE, proximal hip, and core control with self care, mobility, lifting, ambulation. [x] (12968) Provided verbal/tactile cueing for activities related to improving balance, coordination, kinesthetic sense, posture, motor skill, proprioception  to assist with LE, proximal hip, and core control in self care, mobility, lifting, ambulation and eccentric single leg control.      NMR and Therapeutic Activities:    [x] (92108 or 67130) Provided verbal/tactile cueing for activities related to improving balance, coordination, kinesthetic sense, posture, motor skill, proprioception and motor activation to allow for proper function of core, proximal hip and LE with self care and ADLs  [x] (75574) Gait Re-education- Provided training and instruction to the patient for proper LE, core and proximal hip recruitment and positioning and eccentric body weight control with ambulation re-education including up and down stairs     Home Exercise Program:    [x] (77750) Reviewed/Progressed HEP activities related to strengthening, flexibility, endurance, ROM of core, proximal hip and LE for functional self-care, mobility, lifting and ambulation/stair navigation   [x] (99049)Reviewed/Progressed HEP activities related to improving balance, coordination, kinesthetic sense, posture, motor skill, proprioception of core, proximal hip and LE for self care, mobility, lifting, and ambulation/stair navigation      Manual Treatments:  PROM / STM / Oscillations-Mobs:  G-I, II, III, IV (PA's, Inf., Post.)  [x] (16042) Provided manual therapy to mobilize LE, proximal hip and/or LS spine soft tissue/joints for the purpose of modulating pain, promoting relaxation,  increasing ROM, reducing/eliminating soft tissue swelling/inflammation/restriction, improving soft tissue extensibility and allowing for proper ROM for normal function with self care, mobility, lifting and ambulation. Modalities:  Game Ready to R knee for pain/inflammation/edema - 10 minutes. 1/26    Charges:  Timed Code Treatment Minutes: 45   Total Treatment Minutes: 55       [] EVAL (LOW) 19055 (typically 20 minutes face-to-face)   [] EVAL (MOD) 41050 (typically 30 minutes face-to-face)  [] EVAL (HIGH) 74993 (typically 45 minutes face-to-face)  [] RE-EVAL     [x] QW(96831) x 2    [] IONTO (96233)  [] NMR (35905) x    [] VASO (88622)  [x] Manual (31647) x 1    [] Other:  [] TA (70085)x    [] Mech Traction (50432)  [] ES(attended) (41174)     [] ES (un) (61452):     GOALS:  Patient stated goal: Be able to do normal activities.   [x] Progressing: [] Met: [] Not Met: [] Adjusted    Therapist goals for Patient: Short Term Goals: To be achieved in: 2 weeks  1. Independent in HEP and progression per patient tolerance, in order to prevent re-injury. [] Progressing: [x] Met: [] Not Met: [] Adjusted  2. Patient will have a decrease in pain to facilitate improvement in movement, function, and ADLs as indicated by Functional Deficits. [x] Progressing: [] Met: [] Not Met: [] Adjusted     Long Term Goals: To be achieved in: 8 weeks  1. Disability index score of 20% or less for the LEFS to assist with reaching prior level of function. [x] Progressing: [] Met: [] Not Met: [] Adjusted   2. Patient will demonstrate increased AROM to 0-120 to allow for proper joint functioning as indicated by patients Functional Deficits. [x] Progressing: [] Met: [] Not Met: [] Adjusted    3. Patient will demonstrate an increase in strength to good proximal hip strength and control, within 5lb HHD in LE to allow for proper functional mobility as indicated by patients Functional Deficits. [x] Progressing: [] Met: [] Not Met: [] Adjusted   4. Patient will demonstrate normal gait mechanics without increased symptoms or restriction to allow him to walk and perform all daily mobility. [x] Progressing: [] Met: [] Not Met: [] Adjusted    5. Patient will be able to navigate stairs up/down with reciprocal gait mechanics without increased symptoms or restriction to allow him normal household mobility without restricted access to certain parts of his home. [x] Progressing: [] Met: [] Not Met: [] Adjusted           Progression Towards Functional goals:  [x] Patient is progressing as expected towards functional goals listed. [] Progression is slowed due to complexities listed. [] Progression has been slowed due to co-morbidities. [] Plan just implemented, too soon to assess goals progression  [] Other:     ASSESSMENT: 6 weeks s/p on Friday this week. Patient achieving 0-115 on the L knee without any overpressure.  Patient demonstrating good quad activation on the L knee. Sees Dr. Haider Viramontes for 6 week follow up today after PT session. Will be able to initiate weight bearing on Friday of this week. Progressed BFR again today to include a third 8 minute round of LAQ at edge of bed for advanced L LE strengthening while still maintaining NWBing status until cleared to initiate [address] with RW this Friday. Patient will continue to benefit from skilled PT for full, safe return to PLOF pain free and without limitations. Return to Play: (if applicable)   []  Stage 1: Intro to Strength   []  Stage 2: Return to Run and Strength   []  Stage 3: Return to Jump and Strength   []  Stage 4: Dynamic Strength and Agility   []  Stage 5: Sport Specific Training     []  Ready to Return to Play, Meets All Above Stages   []  Not Ready for Return to Sports   Comments:            Treatment/Activity Tolerance:  [x] Patient tolerated treatment well [] Patient limited by fatique  [] Patient limited by pain  [] Patient limited by other medical complications  [] Other:     Overall Progression Towards Functional goals/ Treatment Progress Update:  [x] Patient is progressing as expected towards functional goals listed. [] Progression is slowed due to complexities/Impairments listed. [] Progression has been slowed due to co-morbidities.   [] Plan just implemented, too soon to assess goals progression <30days   [] Goals require adjustment due to lack of progress  [] Patient is not progressing as expected and requires additional follow up with physician  [] Other    Prognosis for POC: [x] Good [] Fair  [] Poor    Patient requires continued skilled intervention: [x] Yes  [] No        PLAN:   [x] Continue per plan of care [] Alter current plan (see comments)  [] Plan of care initiated [] Hold pending MD visit [] Discharge    Electronically signed by: Mack Phillips, PT, DPT, MS, SCS    Note: If patient does not return for scheduled/recommended follow up visits, this note will serve as a discharge from care along with the most recent update on progress.

## 2022-01-27 NOTE — PROGRESS NOTES
1/26/2022     Interval History:  Status post right posterior medial meniscus root repair on 9/17/2021  Status post left posterior medial meniscus root repair on 12/17/21    The patient returns for postop evaluation. Overall she is doing well. She has no major concerns. She has been nonweightbearing on the left side. No fever or chills. Objective:  Ht 5' 9\" (1.753 m)   Wt 245 lb (111.1 kg)   BMI 36.18 kg/m²      Physical Exam:  Examination of the left knee   There is a + effusion, no gross deformity or skin changes  Range of motion reveals 0 to 130  neg lachman, negative posterior drawer, no pain or laxity with varus or valgus stress at 0 degrees and 30 degrees of flexion  + medial joint line tenderness  5 out of 5 strength throughout distal muscle groups  Sensation is intact to light touch throughout all distributions  There is no calf swelling or tenderness  Palpable DP pulse, brisk cap refill, 2+ symmetric reflexes    Imaging:  AP and lateral x-ray of the left knee obtained in the office today on 12/29/2021 were reviewed. Postoperative changes consistent with meniscus root repair with cortical button along the lateral proximal tibia. There is no fracture or dislocation. No other abnormality. Assessment:  Status post right posterior medial meniscus root repair on 9/17/2021  Status post left posterior medial meniscus root repair on 12/17/21    Plan:  Overall the patient is doing well. We will start to initiate the weightbearing process within the few days when she is 6 weeks out from surgery. She will return to see me in 6 weeks. Continue physical therapy in the meantime. Disclaimer: This note was dictated with voice recognition software. Though review and correction are routine, we apologize for any errors.

## 2022-01-31 ENCOUNTER — HOSPITAL ENCOUNTER (OUTPATIENT)
Dept: PHYSICAL THERAPY | Age: 62
Setting detail: THERAPIES SERIES
Discharge: HOME | End: 2022-01-31
Payer: COMMERCIAL

## 2022-01-31 PROCEDURE — 97140 MANUAL THERAPY 1/> REGIONS: CPT

## 2022-01-31 PROCEDURE — 97110 THERAPEUTIC EXERCISES: CPT

## 2022-01-31 PROCEDURE — 97112 NEUROMUSCULAR REEDUCATION: CPT

## 2022-01-31 NOTE — FLOWSHEET NOTE
[facility], [place]    Physical Therapy Treatment Note/ Progress Report:     Date:  2022    Patient Name:  Neville Nobles    :  1960  MRN: [id number]  Medical/Treatment Diagnosis Information:  · Diagnosis: M23.306 Other meniscus derangements, unspecified meniscus, R knee; M25.561 R knee pain  · Treatment Diagnosis: M23.306 Other meniscus derangements, unspecified meniscus, R knee; M25.561 R knee pain  Insurance/Certification information:  PT Insurance Information: [facility]  Physician Information:  Referring Practitioner: Unique Branch  Plan of care signed (Y/N):     Date of Patient follow up with Physician: 22     Progress Report: []  Yes  [x]  No     Functional Scale: 79% disability - LEFS ()   Date: 22    Date Range for reporting period:  Beginnin21  Endin days or 10 visits    Progress report due (10 Rx/or 30 days whichever is less):      Recertification due (POC duration/ or 90 days whichever is less): 22    Visit # Insurance Allowable Auth Needed   41  (12 post-op for L knee) [facility] []Yes    [x]No     Pain level:  0/10 at start of session; 9/10 at worst.      SUBJECTIVE:    Patient started weight bearing on L using RW over the weekend after clearance from physician on Friday. Goal is to be able to do a full set of stairs by  because she and her  have to move upstairs in their home so that the azael on the bottom level of their home can be replaced. Patient also wants to be ready to travel to [place] to visit her mother-in-law in March.      OBJECTIVE: 22   Observation:    Test measurements:    HIP Abd      HIP Ext       HIP IR       HIP ER       Knee ext 0 0   Knee Flex 125 115   Ankle PF       Ankle DF       Ankle In       Ankle Ev       Strength  LEFT RIGHT   HIP Flexors  4-/5 4-/5    HIP Abductors 4-/5 4/5   HIP Ext       Hip ER       Knee EXT (quad) 4-/5 4+/5    Knee Flex (HS) 4-/5 4/5   Ankle DF       Ankle PF       Ankle Inv       Ankle EV               Circumference  Mid apex  7 cm prox       46.0 cm  51.0 cm     45.5 cm  51.0 cm     Gait:  Patient primarily using a wheelchair for longer distance mobility; using RW for short distance ambulation and transfers at home maintaining [address] on L in accordance with surgical restrictions on the L LE.    RESTRICTIONS/PRECAUTIONS: High BP. Previous tobacco use (5 per day). R knee scope, meniscus repair on 9/17/21. L knee scope, meniscus repair on 12/17/21. NWBing on the L for 6 weeks. Exercises/Interventions:     Therapeutic Exercise Resistance Sets/sec Reps Notes   Recumbent bike   5 min    Gastroc stretch on SB  30s 5    Hamstring stretch Long sitting 30s 5    Ankle pumps HEP   Heelslides w/strap Supine 10s 10 Pushing into the range more. Quad sets     SAQ over bolster    LAQ @EOB 0-90 2 10 Performed with BFR. See below. SLR flexion  2 10 Performed with BFR. See below. SLR abduction  2 10 Performed with BFR. See below. SLR adduction 2#    [email or web address] LOP (160 mmHg) Blue cuff 8 min30-15-15-15  SLR flexion/sidelying hip abd/LAQ/standing hamstring curls          Leg press - 2 up SL down 60#                           Therapeutic Activities       Step ups/downs on 8\"  Cues to drive through heel, achieve TKE on R LE, slow control down during eccentric phase   TRX squat taps to chair Elevated x1 foam    Gait training w/RW - NWBing on L LE  Worked on coordination of RW and R LE to maintain NWBIng on L LE following surgery coming up this Friday (12/17)                 Manual Intervention       R patella mobs - inferior, medial/lateral - supine, EOB   6 min Gr. I-II   R knee PROM - supine, EOB   6 min Pushing into flexion range more now. NMR Re-education       Biodex - limits of stability   Medium skill level lvl 9 stability  3x    CC TKE Resistance 3 5s 20    Mod.  BOSU lunge isos     Sport cord march (blue)  Each position; side/side   SLS balance on airex     Reverse lunge w/slider              Access Code: [email or web address]  URL: [email or web address]  Date: 10/06/2021  Prepared by: Monie Anderson    Exercises    *In addition to pre-op exercises including hamstring stretch, gastroc stretch, heelslides, quad sets, SLR flexion. Spoke with Dr. Adilene Reid regarding the use of BFR with patient. Bloodflow restriction was utilized throughout exercise session with use of Emi Unit for a period of 8 minutes at  70% Occlusion. Patient's total limp occlusion pressure [facility] the entire time of occlusion. Therapeutic Exercise and NMR EXR  [x] (82339) Provided verbal/tactile cueing for activities related to strengthening, flexibility, endurance, ROM for improvements in LE, proximal hip, and core control with self care, mobility, lifting, ambulation. [x] (91655) Provided verbal/tactile cueing for activities related to improving balance, coordination, kinesthetic sense, posture, motor skill, proprioception  to assist with LE, proximal hip, and core control in self care, mobility, lifting, ambulation and eccentric single leg control.      NMR and Therapeutic Activities:    [x] (00031 or 37995) Provided verbal/tactile cueing for activities related to improving balance, coordination, kinesthetic sense, posture, motor skill, proprioception and motor activation to allow for proper function of core, proximal hip and LE with self care and ADLs  [x] (52931) Gait Re-education- Provided training and instruction to the patient for proper LE, core and proximal hip recruitment and positioning and eccentric body weight control with ambulation re-education including up and down stairs     Home Exercise Program:    [x] (60211) Reviewed/Progressed HEP activities related to strengthening, flexibility, endurance, ROM of core, proximal hip and LE for functional self-care, mobility, lifting and ambulation/stair navigation   [x] (25612)Reviewed/Progressed HEP activities related to improving balance, coordination, kinesthetic sense, posture, motor skill, proprioception of core, proximal hip and LE for self care, mobility, lifting, and ambulation/stair navigation      Manual Treatments:  PROM / STM / Oscillations-Mobs:  G-I, II, III, IV (PA's, Inf., Post.)  [x] (18848) Provided manual therapy to mobilize LE, proximal hip and/or LS spine soft tissue/joints for the purpose of modulating pain, promoting relaxation,  increasing ROM, reducing/eliminating soft tissue swelling/inflammation/restriction, improving soft tissue extensibility and allowing for proper ROM for normal function with self care, mobility, lifting and ambulation. Modalities:  . Deferred. Will ice at home. 1/31    Charges:  Timed Code Treatment Minutes: 45   Total Treatment Minutes: 45       [] EVAL (LOW) 03717 (typically 20 minutes face-to-face)   [] EVAL (MOD) 24192 (typically 30 minutes face-to-face)  [] EVAL (HIGH) 87445 (typically 45 minutes face-to-face)  [] RE-EVAL     [x] IL(57473) x 1    [] IONTO (99048)  [x] NMR (16025) x 1   [] VASO (78145)  [x] Manual (46744) x 1    [] Other:  [] TA (00434)x    [] Mech Traction (23184)  [] ES(attended) (57455)     [] ES (un) (85036):     GOALS:  Patient stated goal: Be able to do normal activities. [x] Progressing: [] Met: [] Not Met: [] Adjusted    Therapist goals for Patient:   Short Term Goals: To be achieved in: 2 weeks  1. Independent in HEP and progression per patient tolerance, in order to prevent re-injury. [] Progressing: [x] Met: [] Not Met: [] Adjusted  2. Patient will have a decrease in pain to facilitate improvement in movement, function, and ADLs as indicated by Functional Deficits. [x] Progressing: [] Met: [] Not Met: [] Adjusted     Long Term Goals: To be achieved in: 8 weeks  1.  Disability index score of 20% or less for the LEFS to assist with reaching prior level of function. [x] Progressing: [] Met: [] Not Met: [] Adjusted   2. Patient will demonstrate increased AROM to 0-120 to allow for proper joint functioning as indicated by patients Functional Deficits. [x] Progressing: [] Met: [] Not Met: [] Adjusted    3. Patient will demonstrate an increase in strength to good proximal hip strength and control, within 5lb HHD in LE to allow for proper functional mobility as indicated by patients Functional Deficits. [x] Progressing: [] Met: [] Not Met: [] Adjusted   4. Patient will demonstrate normal gait mechanics without increased symptoms or restriction to allow him to walk and perform all daily mobility. [x] Progressing: [] Met: [] Not Met: [] Adjusted    5. Patient will be able to navigate stairs up/down with reciprocal gait mechanics without increased symptoms or restriction to allow him normal household mobility without restricted access to certain parts of his home. [x] Progressing: [] Met: [] Not Met: [] Adjusted           Progression Towards Functional goals:  [x] Patient is progressing as expected towards functional goals listed. [] Progression is slowed due to complexities listed. [] Progression has been slowed due to co-morbidities. [] Plan just implemented, too soon to assess goals progression  [] Other:     ASSESSMENT:  Cleared to initiate weight bearing as sandie on L LE on Friday last week following physician follow up. Patient has been WBAT on L LE with RW since then. Progressed BFR again today to include some standing tasks for continued progression ofd L LE strengthening. Also initiated some WBing progression for L LE with performance of recumbent bike, CC TKE, and biodex weight shifting tasks. Patient appropriately fatigued at end of session.     Return to Play: (if applicable)   []  Stage 1: Intro to Strength   []  Stage 2: Return to Run and Strength   []  Stage 3: Return to Jump and Strength   []  Stage 4: Dynamic Strength and Agility   []  Stage 5: Sport Specific Training     []  Ready to Return to Play, [employer]   []  Not Ready for Return to Sports   Comments:            Treatment/Activity Tolerance:  [x] Patient tolerated treatment well [] Patient limited by fatique  [] Patient limited by pain  [] Patient limited by other medical complications  [] Other:     Overall Progression Towards Functional goals/ Treatment Progress Update:  [x] Patient is progressing as expected towards functional goals listed. [] Progression is slowed due to complexities/Impairments listed. [] Progression has been slowed due to co-morbidities. [] Plan just implemented, too soon to assess goals progression <30days   [] Goals require adjustment due to lack of progress  [] Patient is not progressing as expected and requires additional follow up with physician  [] Other    Prognosis for POC: [x] Good [] Fair  [] Poor    Patient requires continued skilled intervention: [x] Yes  [] No        PLAN:   [x] Continue per plan of care [] Alter current plan (see comments)  [] Plan of care initiated [] Hold pending MD visit [] Discharge    Electronically signed by: Maura Whittaker, PT, DPT, MS, SCS    Note: If patient does not return for scheduled/recommended follow up visits, this note will serve as a discharge from care along with the most recent update on progress.

## 2022-02-02 ENCOUNTER — HOSPITAL ENCOUNTER (OUTPATIENT)
Dept: PHYSICAL THERAPY | Age: 62
Setting detail: THERAPIES SERIES
Discharge: HOME | End: 2022-02-02
Payer: COMMERCIAL

## 2022-02-02 PROCEDURE — 97110 THERAPEUTIC EXERCISES: CPT

## 2022-02-02 PROCEDURE — 97140 MANUAL THERAPY 1/> REGIONS: CPT

## 2022-02-02 PROCEDURE — 97112 NEUROMUSCULAR REEDUCATION: CPT

## 2022-02-02 NOTE — FLOWSHEET NOTE
[facility], [place]    Physical Therapy Treatment Note/ Progress Report:     Date:  2022    Patient Name:  Kindra Rivero    :  1960  MRN: [id number]  Medical/Treatment Diagnosis Information:  · Diagnosis: M23.306 Other meniscus derangements, unspecified meniscus, R knee; M25.561 R knee pain  · Treatment Diagnosis: M23.306 Other meniscus derangements, unspecified meniscus, R knee; M25.561 R knee pain  Insurance/Certification information:  PT Insurance Information: [facility]  Physician Information:  Referring Practitioner: Quintin Luis  Plan of care signed (Y/N):     Date of Patient follow up with Physician: 22     Progress Report: []  Yes  [x]  No     Functional Scale: 79% disability - LEFS ()   Date: 22    Date Range for reporting period:  Beginnin21  Endin days or 10 visits    Progress report due (10 Rx/or 30 days whichever is less):      Recertification due (POC duration/ or 90 days whichever is less): 22    Visit # Insurance Allowable Auth Needed   42  (13 post-op for L knee) [facility] []Yes    [x]No     Pain level:  0/10 at start of session; 9/10 at worst.      SUBJECTIVE:    MW TRAORE was definitely fatigued after last session. Goal is to be able to do a full set of stairs by  because she and her  have to move upstairs in their home so that the azael on the bottom level of their home can be replaced. Patient also wants to be ready to travel to [place] to visit her mother-in-law in March.      OBJECTIVE: 22   Observation:    Test measurements:    HIP Abd      HIP Ext       HIP IR       HIP ER       Knee ext 0 0   Knee Flex 125 115   Ankle PF       Ankle DF       Ankle In       Ankle Ev       Strength  LEFT RIGHT   HIP Flexors  4-/5 4-/5    HIP Abductors 4-/5 4/5   HIP Ext       Hip ER       Knee EXT (quad) 4-/5 4+/5    Knee Flex (HS) 4-/5 4/5   Ankle DF       Ankle PF       Ankle Inv Ankle EV               Circumference  Mid apex  7 cm prox       46.0 cm  51.0 cm     45.5 cm  51.0 cm     Gait:  Patient primarily using a wheelchair for longer distance mobility; using RW for short distance ambulation and transfers at home maintaining [address] on L in accordance with surgical restrictions on the L LE.    RESTRICTIONS/PRECAUTIONS: High BP. Previous tobacco use (5 per day). R knee scope, meniscus repair on 9/17/21. L knee scope, meniscus repair on 12/17/21. NWBing on the L for 6 weeks. Exercises/Interventions:     Therapeutic Exercise Resistance Sets/sec Reps Notes   Recumbent bike   5 min    Gastroc stretch on SB  30s 5    Hamstring stretch Long sitting 30s 5    Ankle pumps HEP   Heelslides w/strap Supine 10s 10 Pushing into the range more. Quad sets     SAQ over bolster    LAQ @EOB 0-90 2 10 Performed with BFR. See below. SLR flexion  2 10 Performed with BFR. See below. SLR abduction  2 10 Performed with BFR. See below. SLR adduction 2#    [email or web address] LOP (160 mmHg) Blue cuff 8 min30-15-15-15  SLR flexion/sidelying hip abd/LAQ/standing hamstring curls          Leg press - 2 up SL down 60#                           Therapeutic Activities       Step ups/downs on 8\"  Cues to drive through heel, achieve TKE on R LE, slow control down during eccentric phase   TRX squat taps to chair Elevated x1 foam    Gait training w/RW - NWBing on L LE  Worked on coordination of RW and R LE to maintain NWBIng on L LE following surgery coming up this Friday (12/17)                 Manual Intervention       R patella mobs - inferior, medial/lateral - supine, EOB   6 min Gr. I-II   R knee PROM - supine, EOB   6 min Pushing into flexion range more now. NMR Re-education       Biodex - limits of stability   Medium skill level lvl 9 stability  3x    CC TKE Resistance 3 5s 20    Mod. BOSU lunge isos  10s 5    Sport cord march (yellow) Fwd/bwd 2 10 Each position;  To begin initiating SLS positions   SLS balance on airex     Reverse lunge w/slider              Access Code: [email or web address]  URL: [email or web address]  Date: 10/06/2021  Prepared by: Tammy Tee    Exercises    *In addition to pre-op exercises including hamstring stretch, gastroc stretch, heelslides, quad sets, SLR flexion. Spoke with Dr. Heather Napoles regarding the use of BFR with patient. Bloodflow restriction was utilized throughout exercise session with use of Emi Unit for a period of 8 minutes at  70% Occlusion. Patient's total limp occlusion pressure was calculated and monitored by the [facility] for the entire time of occlusion. Therapeutic Exercise and NMR EXR  [x] (52334) Provided verbal/tactile cueing for activities related to strengthening, flexibility, endurance, ROM for improvements in LE, proximal hip, and core control with self care, mobility, lifting, ambulation. [x] (83420) Provided verbal/tactile cueing for activities related to improving balance, coordination, kinesthetic sense, posture, motor skill, proprioception  to assist with LE, proximal hip, and core control in self care, mobility, lifting, ambulation and eccentric single leg control.      NMR and Therapeutic Activities:    [x] (60202 or 82845) Provided verbal/tactile cueing for activities related to improving balance, coordination, kinesthetic sense, posture, motor skill, proprioception and motor activation to allow for proper function of core, proximal hip and LE with self care and ADLs  [x] (89267) Gait Re-education- Provided training and instruction to the patient for proper LE, core and proximal hip recruitment and positioning and eccentric body weight control with ambulation re-education including up and down stairs     Home Exercise Program:    [x] (28822) Reviewed/Progressed HEP activities related to strengthening, flexibility, endurance, ROM of core, proximal hip and LE for functional self-care, mobility, lifting and ambulation/stair navigation   [x] (19674)Reviewed/Progressed HEP activities related to improving balance, coordination, kinesthetic sense, posture, motor skill, proprioception of core, proximal hip and LE for self care, mobility, lifting, and ambulation/stair navigation      Manual Treatments:  PROM / STM / Oscillations-Mobs:  G-I, II, III, IV (PA's, Inf., Post.)  [x] (70979) Provided manual therapy to mobilize LE, proximal hip and/or LS spine soft tissue/joints for the purpose of modulating pain, promoting relaxation,  increasing ROM, reducing/eliminating soft tissue swelling/inflammation/restriction, improving soft tissue extensibility and allowing for proper ROM for normal function with self care, mobility, lifting and ambulation. Modalities:  . Deferred. Will ice at home. 1/31    Charges:  Timed Code Treatment Minutes: 45   Total Treatment Minutes: 45       [] EVAL (LOW) 13327 (typically 20 minutes face-to-face)   [] EVAL (MOD) 73138 (typically 30 minutes face-to-face)  [] EVAL (HIGH) 90376 (typically 45 minutes face-to-face)  [] RE-EVAL     [x] IQ(06613) x 1    [] IONTO (34587)  [x] NMR (65466) x 1   [] VASO (76691)  [x] Manual (11695) x 1    [] Other:  [] TA (80002)x    [] Mech Traction (42813)  [] ES(attended) (08368)     [] ES (un) (49704):     GOALS:  Patient stated goal: Be able to do normal activities. [x] Progressing: [] Met: [] Not Met: [] Adjusted    Therapist goals for Patient:   Short Term Goals: To be achieved in: 2 weeks  1. Independent in HEP and progression per patient tolerance, in order to prevent re-injury. [] Progressing: [x] Met: [] Not Met: [] Adjusted  2. Patient will have a decrease in pain to facilitate improvement in movement, function, and ADLs as indicated by Functional Deficits. [x] Progressing: [] Met: [] Not Met: [] Adjusted     Long Term Goals: To be achieved in: 8 weeks  1. Disability index score of 20% or less for the LEFS to assist with reaching prior level of function.    [x] Progressing: [] Met: [] Not Met: [] Adjusted   2. Patient will demonstrate increased AROM to 0-120 to allow for proper joint functioning as indicated by patients Functional Deficits. [x] Progressing: [] Met: [] Not Met: [] Adjusted    3. Patient will demonstrate an increase in strength to good proximal hip strength and control, within 5lb HHD in LE to allow for proper functional mobility as indicated by patients Functional Deficits. [x] Progressing: [] Met: [] Not Met: [] Adjusted   4. Patient will demonstrate normal gait mechanics without increased symptoms or restriction to allow him to walk and perform all daily mobility. [x] Progressing: [] Met: [] Not Met: [] Adjusted    5. Patient will be able to navigate stairs up/down with reciprocal gait mechanics without increased symptoms or restriction to allow him normal household mobility without restricted access to certain parts of his home. [x] Progressing: [] Met: [] Not Met: [] Adjusted           Progression Towards Functional goals:  [x] Patient is progressing as expected towards functional goals listed. [] Progression is slowed due to complexities listed. [] Progression has been slowed due to co-morbidities. [] Plan just implemented, too soon to assess goals progression  [] Other:     ASSESSMENT:   Patient able to tolerate mild WBing progression again today with initiation of modified BOSU lunges and sport cord marches to initiate and encourage improved single limb stance load tolerance and balance. Patient appropriately fatigued at end of session. No L knee pain.     Return to Play: (if applicable)   []  Stage 1: Intro to Strength   []  Stage 2: Return to Run and Strength   []  Stage 3: Return to Jump and Strength   []  Stage 4: Dynamic Strength and Agility   []  Stage 5: Sport Specific Training     []  Ready to Return to Play, Meets All Above Stages   []  Not Ready for Return to Sports   Comments:            Treatment/Activity Tolerance:  [x] Patient tolerated treatment well [] Patient limited by fatique  [] Patient limited by pain  [] Patient limited by other medical complications  [] Other:     Overall Progression Towards Functional goals/ Treatment Progress Update:  [x] Patient is progressing as expected towards functional goals listed. [] Progression is slowed due to complexities/Impairments listed. [] Progression has been slowed due to co-morbidities. [] Plan just implemented, too soon to assess goals progression <30days   [] Goals require adjustment due to lack of progress  [] Patient is not progressing as expected and requires additional follow up with physician  [] Other    Prognosis for POC: [x] Good [] Fair  [] Poor    Patient requires continued skilled intervention: [x] Yes  [] No        PLAN:   [x] Continue per plan of care [] Alter current plan (see comments)  [] Plan of care initiated [] Hold pending MD visit [] Discharge    Electronically signed by: Neida Wallace, PT, DPT, MS, SCS    Note: If patient does not return for scheduled/recommended follow up visits, this note will serve as a discharge from care along with the most recent update on progress.

## 2022-02-07 ENCOUNTER — HOSPITAL ENCOUNTER (OUTPATIENT)
Dept: PHYSICAL THERAPY | Age: 62
Setting detail: THERAPIES SERIES
Discharge: HOME | End: 2022-02-07
Payer: COMMERCIAL

## 2022-02-07 PROCEDURE — 97140 MANUAL THERAPY 1/> REGIONS: CPT

## 2022-02-07 PROCEDURE — 97110 THERAPEUTIC EXERCISES: CPT

## 2022-02-07 PROCEDURE — 97112 NEUROMUSCULAR REEDUCATION: CPT

## 2022-02-07 NOTE — FLOWSHEET NOTE
[facility], [place]    Physical Therapy Treatment Note/ Progress Report:     Date:  2022    Patient Name:  Liliana Mei    :  1960  MRN: [id number]  Medical/Treatment Diagnosis Information:  · Diagnosis: M23.306 Other meniscus derangements, unspecified meniscus, R knee; M25.561 R knee pain  · Treatment Diagnosis: M23.306 Other meniscus derangements, unspecified meniscus, R knee; M25.561 R knee pain  Insurance/Certification information:  PT Insurance Information: [facility]  Physician Information:  Referring Practitioner: Stoney Chawla  Plan of care signed (Y/N):     Date of Patient follow up with Physician: 22     Progress Report: []  Yes  [x]  No     Functional Scale: 79% disability - LEFS ()   Date: 22    Date Range for reporting period:  Beginnin21  Endin days or 10 visits    Progress report due (10 Rx/or 30 days whichever is less):      Recertification due (POC duration/ or 90 days whichever is less): 22    Visit # Insurance Allowable Auth Needed   37  (14 post-op for L knee) [facility] []Yes    [x]No     Pain level:  0/10 at start of session; 9/10 at worst.      SUBJECTIVE:   Patient notes good tolerance to progressing weight bearing on LLE last session. Today has been the first day she has used a cane for ambulation, seeming to go well so far without increased pain. Continues to note need to be able to do a full set of stairs by  because she and her  have to move upstairs in their home so that the azael on the bottom level of their home can be replaced. Patient also wants to be ready to travel to [place] to visit her mother-in-law in March.      OBJECTIVE: 22   Observation:    Test measurements:    HIP Abd      HIP Ext       HIP IR       HIP ER       Knee ext 0 0   Knee Flex 125 115   Ankle PF       Ankle DF       Ankle In       Ankle Ev       Strength  LEFT RIGHT   HIP Flexors  4-/5 4-/5 HIP Abductors 4-/5 4/5   HIP Ext       Hip ER       Knee EXT (quad) 4-/5 4+/5    Knee Flex (HS) 4-/5 4/5   Ankle DF       Ankle PF       Ankle Inv       Ankle EV               Circumference  Mid apex  7 cm prox       46.0 cm  51.0 cm     45.5 cm  51.0 cm     Gait:  Patient primarily using a wheelchair for longer distance mobility; just starting to use standard cane for short distance ambulation     RESTRICTIONS/PRECAUTIONS: High BP. Previous tobacco use (5 per day). R knee scope, meniscus repair on 9/17/21. L knee scope, meniscus repair on 12/17/21. NWBing on the L for 6 weeks. Exercises/Interventions:     Therapeutic Exercise Resistance Sets/sec Reps Notes   Recumbent bike   5 min    Gastroc stretch on SB  30s 5    Hamstring stretch Long sitting 30s 5    Ankle pumps HEP   Heelslides w/strap Supine 10s 10 Pushing into the range more. Quad sets     SAQ over bolster    LAQ @EOB 0-90 2 10 Performed with BFR. See below. SLR flexion  2 10 Performed with BFR. See below. SLR abduction  2 10 Performed with BFR. See below. SLR adduction 2#    [email or web address] LOP (160 mmHg) Blue cuff 8 min30-15-15-15  SLR flexion/sidelying hip abd/LAQ/standing hamstring curls          Leg press -SL 30# 2 10 each                          Therapeutic Activities       Step ups/downs on 6\" (RLE)  2 10 Cues to drive through heel, achieve TKE on R LE, slow control down during eccentric phase   TRX squat taps to chair Elevated x1 foam    Gait training w/RW - NWBing on L LE  Worked on coordination of RW and R LE to maintain NWBIng on L LE following surgery coming up this Friday (12/17)                 Manual Intervention       R patella mobs - inferior, medial/lateral - supine, EOB   6 min Gr. I-II   R knee PROM - supine, EOB   6 min Pushing into flexion range more now. NMR Re-education       Biodex - limits of stability   Medium skill level lvl 9 stability  3x    CC TKE Resistance 3 5s 20    Mod.  BOSU lunge isos 10s 10    Sport cord march (yellow) Fwd/bwd 2 10 Each position; To begin initiating SLS positions   SLS balance on airex     Reverse lunge w/slider              Access Code: [email or web address]  URL: [email or web address]  Date: 10/06/2021  Prepared by: Bisi Corrales    Exercises    *In addition to pre-op exercises including hamstring stretch, gastroc stretch, heelslides, quad sets, SLR flexion. Spoke with Dr. Sherri Hughes 17 [address] regarding the use of BFR with patient. Bloodflow restriction was utilized throughout exercise session with use of Emi Unit for a period of 8 minutes at  70% Occlusion. Patient's total limp occlusion [facility] time of occlusion. Therapeutic Exercise and NMR EXR  [x] (55147) Provided verbal/tactile cueing for activities related to strengthening, flexibility, endurance, ROM for improvements in LE, proximal hip, and core control with self care, mobility, lifting, ambulation. [x] (64677) Provided verbal/tactile cueing for activities related to improving balance, coordination, kinesthetic sense, posture, motor skill, proprioception  to assist with LE, proximal hip, and core control in self care, mobility, lifting, ambulation and eccentric single leg control.      NMR and Therapeutic Activities:    [x] (00363 or 06328) Provided verbal/tactile cueing for activities related to improving balance, coordination, kinesthetic sense, posture, motor skill, proprioception and motor activation to allow for proper function of core, proximal hip and LE with self care and ADLs  [x] (99481) Gait Re-education- Provided training and instruction to the patient for proper LE, core and proximal hip recruitment and positioning and eccentric body weight control with ambulation re-education including up and down stairs     Home Exercise Program:    [x] (17413) Reviewed/Progressed HEP activities related to strengthening, flexibility, endurance, ROM of core, proximal hip and LE for functional self-care, mobility, lifting and ambulation/stair navigation   [x] (62953)Reviewed/Progressed HEP activities related to improving balance, coordination, kinesthetic sense, posture, motor skill, proprioception of core, proximal hip and LE for self care, mobility, lifting, and ambulation/stair navigation      Manual Treatments:  PROM / STM / Oscillations-Mobs:  G-I, II, III, IV (PA's, Inf., Post.)  [x] (96210) Provided manual therapy to mobilize LE, proximal hip and/or LS spine soft tissue/joints for the purpose of modulating pain, promoting relaxation,  increasing ROM, reducing/eliminating soft tissue swelling/inflammation/restriction, improving soft tissue extensibility and allowing for proper ROM for normal function with self care, mobility, lifting and ambulation. Modalities:  . Deferred. Will ice at home. 1/31    Charges:  Timed Code Treatment Minutes: 45   Total Treatment Minutes: 45       [] EVAL (LOW) 24146 (typically 20 minutes face-to-face)   [] EVAL (MOD) 36926 (typically 30 minutes face-to-face)  [] EVAL (HIGH) 86671 (typically 45 minutes face-to-face)  [] RE-EVAL     [x] OZ(45954) x 1    [] IONTO (41195)  [x] NMR (90939) x 1   [] VASO (15152)  [x] Manual (64181) x 1    [] Other:  [] TA (85689)x    [] Mech Traction (27310)  [] ES(attended) (82231)     [] ES (un) (16037):     GOALS:  Patient stated goal: Be able to do normal activities. [x] Progressing: [] Met: [] Not Met: [] Adjusted    Therapist goals for Patient:   Short Term Goals: To be achieved in: 2 weeks  1. Independent in HEP and progression per patient tolerance, in order to prevent re-injury. [] Progressing: [x] Met: [] Not Met: [] Adjusted  2. Patient will have a decrease in pain to facilitate improvement in movement, function, and ADLs as indicated by Functional Deficits. [x] Progressing: [] Met: [] Not Met: [] Adjusted     Long Term Goals: To be achieved in: 8 weeks  1.  Disability index score of 20% or less for the LEFS to assist with reaching prior level of function. [x] Progressing: [] Met: [] Not Met: [] Adjusted   2. Patient will demonstrate increased AROM to 0-120 to allow for proper joint functioning as indicated by patients Functional Deficits. [x] Progressing: [] Met: [] Not Met: [] Adjusted    3. Patient will demonstrate an increase in strength to good proximal hip strength and control, within 5lb HHD in LE to allow for proper functional mobility as indicated by patients Functional Deficits. [x] Progressing: [] Met: [] Not Met: [] Adjusted   4. Patient will demonstrate normal gait mechanics without increased symptoms or restriction to allow him to walk and perform all daily mobility. [x] Progressing: [] Met: [] Not Met: [] Adjusted    5. Patient will be able to navigate stairs up/down with reciprocal gait mechanics without increased symptoms or restriction to allow him normal household mobility without restricted access to certain parts of his home. [x] Progressing: [] Met: [] Not Met: [] Adjusted           Progression Towards Functional goals:  [x] Patient is progressing as expected towards functional goals listed. [] Progression is slowed due to complexities listed. [] Progression has been slowed due to co-morbidities. [] Plan just implemented, too soon to assess goals progression  [] Other:     ASSESSMENT:   Appropriate fatigue at conclusion with no increase in pain. Educated patient that most likely way for her to navigate stairs will be to use RLE almost exclusively, so resumed functional strengthening for RLE on steps. Patient with difficulty performing step up with appropriate form on anything bigger than 6\" and difficulty descending step on anything bigger than 4\" step using RLE, did not attempt steps with LLE. Continuing to tolerate weight bearing progression on LLE well.      Return to Play: (if applicable)   []  Stage 1: Intro to Strength   []  Stage 2: Return to Run and Strength   []  Stage 3: Return to Jump and Strength   []  Stage 4: Dynamic Strength and Agility   []  Stage 5: Sport Specific Training     []  Ready to Return to Play, Meets All Above Stages   []  Not Ready for Return to Sports   Comments:            Treatment/Activity Tolerance:  [x] Patient tolerated treatment well [] Patient limited by fatique  [] Patient limited by pain  [] Patient limited by other medical complications  [] Other:     Overall Progression Towards Functional goals/ Treatment Progress Update:  [x] Patient is progressing as expected towards functional goals listed. [] Progression is slowed due to complexities/Impairments listed. [] Progression has been slowed due to co-morbidities. [] Plan just implemented, too soon to assess goals progression <30days   [] Goals require adjustment due to lack of progress  [] Patient is not progressing as expected and requires additional follow up with physician  [] Other    Prognosis for POC: [x] Good [] Fair  [] Poor    Patient requires continued skilled intervention: [x] Yes  [] No        PLAN:   [x] Continue per plan of care [] Alter current plan (see comments)  [] Plan of care initiated [] Hold pending MD visit [] Discharge    Electronically signed by: Farhad Smiley, PT, DPT, MS, SCS    Note: If patient does not return for scheduled/recommended follow up visits, this note will serve as a discharge from care along with the most recent update on progress.

## 2022-02-09 ENCOUNTER — HOSPITAL ENCOUNTER (OUTPATIENT)
Dept: PHYSICAL THERAPY | Age: 62
Setting detail: THERAPIES SERIES
Discharge: HOME | End: 2022-02-09
Payer: COMMERCIAL

## 2022-02-09 PROCEDURE — 97112 NEUROMUSCULAR REEDUCATION: CPT

## 2022-02-09 PROCEDURE — 97140 MANUAL THERAPY 1/> REGIONS: CPT

## 2022-02-09 PROCEDURE — 97110 THERAPEUTIC EXERCISES: CPT

## 2022-02-09 NOTE — FLOWSHEET NOTE
Ankle In       Ankle Ev       Strength  LEFT RIGHT   HIP Flexors  4-/5 4-/5    HIP Abductors 4-/5 4/5   HIP Ext       Hip ER       Knee EXT (quad) 4-/5 4+/5    Knee Flex (HS) 4-/5 4/5   Ankle DF       Ankle PF       Ankle Inv       Ankle EV               Circumference  Mid apex  7 cm prox       46.0 cm  51.0 cm     45.5 cm  51.0 cm     Gait:  Patient using SPC for ambulation with slow daniel. RESTRICTIONS/PRECAUTIONS: High BP. Previous tobacco use (5 per day). R knee scope, meniscus repair on 9/17/21. L knee scope, meniscus repair on 12/17/21. NWBing on the L for 6 weeks. Exercises/Interventions:     Therapeutic Exercise Resistance Sets/sec Reps Notes   Recumbent bike   5 min    Gastroc stretch on SB  30s 5    Hamstring stretch Long sitting 30s 5    Ankle pumps HEP   Heelslides w/strap Supine 10s 10 Pushing into the range more, bilat. Quad sets     SAQ over bolster    LAQ @EOB 0-90 2 10 Performed with BFR. See below. SLR flexion  2 10 Performed with BFR. See below. SLR abduction  2 10 Performed with BFR. See below. SLR adduction 2#    [email or web address] LOP (160 mmHg) Blue cuff 8 min30-15-15-15  SLR flexion/sidelying hip abd/LAQ/standing hamstring curls          Leg press -SL 30# 2 10 each                          Therapeutic Activities       Step ups/downs on 6\" (RLE)  0  Cues to drive through heel, achieve TKE on R LE, slow control down during eccentric phase   TRX squat taps to chair Elevated x1 foam    Gait training w/RW - NWBing on L LE  Worked on coordination of RW and R LE to maintain NWBIng on L LE following surgery coming up this Friday (12/17)                 Manual Intervention       R patella mobs - inferior, medial/lateral - supine, EOB   6 min Gr. I-II   R knee PROM - supine, EOB   6 min Pushing into flexion range more now. NMR Re-education       Biodex - limits of stability   Medium skill level lvl 9 stability  3x    CC TKE Resistance 3 5s 20    Mod.  BOSU lunge isos  10s 10 bilat   Sport cord march (yellow) Fwd/bwd 2 10 Each position; To begin initiating SLS positions   SLS balance on airex     Reverse lunge w/slider              Access Code: [email or web address]  URL: [email or web address]  Date: 10/06/2021  Prepared by: Pauline Bedolla    Exercises    *In addition to pre-op exercises including hamstring stretch, gastroc stretch, heelslides, quad sets, SLR flexion. Spoke with Dr. Usama Rodriguez regarding the use of BFR with patient. Bloodflow restriction was utilized throughout exercise session with use of Emi Unit for a period of 8 minutes at  70% Occlusion. Patient's total limp occlusion pressure [facility] entire time of occlusion. Therapeutic Exercise and NMR EXR  [x] (64872) Provided verbal/tactile cueing for activities related to strengthening, flexibility, endurance, ROM for improvements in LE, proximal hip, and core control with self care, mobility, lifting, ambulation. [x] (89468) Provided verbal/tactile cueing for activities related to improving balance, coordination, kinesthetic sense, posture, motor skill, proprioception  to assist with LE, proximal hip, and core control in self care, mobility, lifting, ambulation and eccentric single leg control.      NMR and Therapeutic Activities:    [x] (53700 or 58363) Provided verbal/tactile cueing for activities related to improving balance, coordination, kinesthetic sense, posture, motor skill, proprioception and motor activation to allow for proper function of core, proximal hip and LE with self care and ADLs  [x] (64724) Gait Re-education- Provided training and instruction to the patient for proper LE, core and proximal hip recruitment and positioning and eccentric body weight control with ambulation re-education including up and down stairs     Home Exercise Program:    [x] (78715) Reviewed/Progressed HEP activities related to strengthening, flexibility, endurance, ROM of core, proximal hip and LE for functional self-care, mobility, lifting and ambulation/stair navigation   [x] (31986)Reviewed/Progressed HEP activities related to improving balance, coordination, kinesthetic sense, posture, motor skill, proprioception of core, proximal hip and LE for self care, mobility, lifting, and ambulation/stair navigation      Manual Treatments:  PROM / STM / Oscillations-Mobs:  G-I, II, III, IV (PA's, Inf., Post.)  [x] (80373) Provided manual therapy to mobilize LE, proximal hip and/or LS spine soft tissue/joints for the purpose of modulating pain, promoting relaxation,  increasing ROM, reducing/eliminating soft tissue swelling/inflammation/restriction, improving soft tissue extensibility and allowing for proper ROM for normal function with self care, mobility, lifting and ambulation. Modalities:  . Deferred. Will ice at home. 1/31    Charges:  Timed Code Treatment Minutes: 45   Total Treatment Minutes: 45       [] EVAL (LOW) 26235 (typically 20 minutes face-to-face)   [] EVAL (MOD) 80341 (typically 30 minutes face-to-face)  [] EVAL (HIGH) 63105 (typically 45 minutes face-to-face)  [] RE-EVAL     [x] HU(35251) x 1    [] IONTO (10595)  [x] NMR (13370) x 1   [] VASO (48756)  [x] Manual (07904) x 1    [] Other:  [] TA (17359)x    [] Mech Traction (27478)  [] ES(attended) (33483)     [] ES (un) (65176):     GOALS:  Patient stated goal: Be able to do normal activities. [x] Progressing: [] Met: [] Not Met: [] Adjusted    Therapist goals for Patient:   Short Term Goals: To be achieved in: 2 weeks  1. Independent in HEP and progression per patient tolerance, in order to prevent re-injury. [] Progressing: [x] Met: [] Not Met: [] Adjusted  2. Patient will have a decrease in pain to facilitate improvement in movement, function, and ADLs as indicated by Functional Deficits. [x] Progressing: [] Met: [] Not Met: [] Adjusted     Long Term Goals: To be achieved in: 8 weeks  1.  Disability index score of 20% or less for the LEFS to assist with reaching prior level of function. [x] Progressing: [] Met: [] Not Met: [] Adjusted   2. Patient will demonstrate increased AROM to 0-120 to allow for proper joint functioning as indicated by patients Functional Deficits. [x] Progressing: [] Met: [] Not Met: [] Adjusted    3. Patient will demonstrate an increase in strength to good proximal hip strength and control, within 5lb HHD in LE to allow for proper functional mobility as indicated by patients Functional Deficits. [x] Progressing: [] Met: [] Not Met: [] Adjusted   4. Patient will demonstrate normal gait mechanics without increased symptoms or restriction to allow him to walk and perform all daily mobility. [x] Progressing: [] Met: [] Not Met: [] Adjusted    5. Patient will be able to navigate stairs up/down with reciprocal gait mechanics without increased symptoms or restriction to allow him normal household mobility without restricted access to certain parts of his home. [x] Progressing: [] Met: [] Not Met: [] Adjusted           Progression Towards Functional goals:  [x] Patient is progressing as expected towards functional goals listed. [] Progression is slowed due to complexities listed. [] Progression has been slowed due to co-morbidities. [] Plan just implemented, too soon to assess goals progression  [] Other:     ASSESSMENT:   Appropriate fatigue at conclusion with no increase in pain. L knee ROM improving nicely and able to increase to 127 degrees. Patient with on/off reports of R knee discomfort with 40-60 degrees of BW loading during session. Held off on step up on R today. May need to assess further if irritation persists. Continuing to tolerate weight bearing progression on LLE well.      Return to Play: (if applicable)   []  Stage 1: Intro to Strength   []  Stage 2: Return to Run and Strength   []  Stage 3: Return to Jump and Strength   []  Stage 4: Dynamic Strength and Agility   [] Stage 5: Sport Specific Training     []  Ready to Return to Play, Meets All Above Stages   []  Not Ready for Return to Sports   Comments:            Treatment/Activity Tolerance:  [x] Patient tolerated treatment well [] Patient limited by fatique  [] Patient limited by pain  [] Patient limited by other medical complications  [] Other:     Overall Progression Towards Functional goals/ Treatment Progress Update:  [x] Patient is progressing as expected towards functional goals listed. [] Progression is slowed due to complexities/Impairments listed. [] Progression has been slowed due to co-morbidities. [] Plan just implemented, too soon to assess goals progression <30days   [] Goals require adjustment due to lack of progress  [] Patient is not progressing as expected and requires additional follow up with physician  [] Other    Prognosis for POC: [x] Good [] Fair  [] Poor    Patient requires continued skilled intervention: [x] Yes  [] No        PLAN:   [x] Continue per plan of care [] Alter current plan (see comments)  [] Plan of care initiated [] Hold pending MD visit [] Discharge    Electronically signed by: Gus Bui PT, DPT    Note: If patient does not return for scheduled/recommended follow up visits, this note will serve as a discharge from care along with the most recent update on progress.

## 2022-02-14 ENCOUNTER — HOSPITAL ENCOUNTER (OUTPATIENT)
Dept: PHYSICAL THERAPY | Age: 62
Setting detail: THERAPIES SERIES
Discharge: HOME | End: 2022-02-14
Payer: COMMERCIAL

## 2022-02-14 PROCEDURE — 97110 THERAPEUTIC EXERCISES: CPT

## 2022-02-14 PROCEDURE — 97112 NEUROMUSCULAR REEDUCATION: CPT

## 2022-02-14 PROCEDURE — 97140 MANUAL THERAPY 1/> REGIONS: CPT

## 2022-02-14 NOTE — FLOWSHEET NOTE
unless she bumps down on her bottom. Also wants to be ready to travel to [place] to visit her mother-in-law in March. OBJECTIVE: 1/26/22   Observation:    Test measurements:  127 degrees L knee flexion after stretching     HIP Abd      HIP Ext       HIP IR       HIP ER       Knee ext 0 0   Knee Flex 125 115   Ankle PF       Ankle DF       Ankle In       Ankle Ev       Strength  LEFT RIGHT   HIP Flexors  4-/5 4-/5    HIP Abductors 4-/5 4/5   HIP Ext       Hip ER       Knee EXT (quad) 4-/5 4+/5    Knee Flex (HS) 4-/5 4/5   Ankle DF       Ankle PF       Ankle Inv       Ankle EV               Circumference  Mid apex  7 cm prox       46.0 cm  51.0 cm     45.5 cm  51.0 cm     Gait:  Patient using SPC for ambulation with slow daniel. RESTRICTIONS/PRECAUTIONS: High BP. Previous tobacco use (5 per day). R knee scope, meniscus repair on 9/17/21. L knee scope, meniscus repair on 12/17/21. Exercises/Interventions:     Therapeutic Exercise Resistance Sets/sec Reps Notes   Recumbent bike   5 min    Gastroc stretch on SB  30s 5    Hamstring stretch Long sitting 30s 5    Ankle pumps HEP   Heelslides w/strap Supine Pushing into the range more, bilat. Quad sets     SAQ over bolster    LAQ @EOB 0-90 2 10 Performed with BFR. See below. SLR flexion  2 10 Performed with BFR. See below. SLR abduction  2 10 Performed with BFR. See below.    SLR adduction 2#    [email or web address] LOP (160 mmHg) Blue cuff 8 min30-15-15-15  SLR flexion/sidelying hip abd/LAQ/standing hamstring curls    **Performed at end of session starting 2/14          Leg press -SL 30# 2 10 bilat                          Therapeutic Activities       Step ups on 6\"  bilat 1 15 Cues to drive through heel, achieve TKE on R LE, slow control down during eccentric phase   TRX squat taps to chair Elevated x1 foam    Gait training w/RW - NWBing on L LE  Worked on coordination of RW and R LE to maintain NWBIng on L LE following surgery coming up this Friday (12/17) Manual Intervention       R patella mobs - inferior, medial/lateral - supine, EOB   6 min Gr. I-II   R knee PROM - supine, EOB   6 min Pushing into flexion range more now. NMR Re-education       Biodex - limits of stability   Medium skill level lvl 9 stability      CC TKE Resistance 3.5 5s 20 bilat   Mod. BOSU lunge isos (used airex on R only 2/14)  10s 8 bilat   Sport cord march (yellow) Fwd/bwd 2 10 Each position; To begin initiating SLS positions   SLS balance on airex     Reverse lunge w/slider              Access Code: [email or web address]  URL: [email or web address]  Date: 10/06/2021  Prepared by: Pauline Bedolla    Exercises    *In addition to pre-op exercises including hamstring stretch, gastroc stretch, heelslides, quad sets, SLR flexion. Spoke with Dr. Usama Rodriguez regarding the use of BFR with patient. Bloodflow restriction was utilized throughout exercise session with use of Emi Unit for a period of 8 minutes at  70% Occlusion. Patient's total limp occlusion pressure was calculated and monitored by the [facility] for the entire time of occlusion. Therapeutic Exercise and NMR EXR  [x] (79810) Provided verbal/tactile cueing for activities related to strengthening, flexibility, endurance, ROM for improvements in LE, proximal hip, and core control with self care, mobility, lifting, ambulation. [x] (26170) Provided verbal/tactile cueing for activities related to improving balance, coordination, kinesthetic sense, posture, motor skill, proprioception  to assist with LE, proximal hip, and core control in self care, mobility, lifting, ambulation and eccentric single leg control.      NMR and Therapeutic Activities:    [x] (32164 or 61299) Provided verbal/tactile cueing for activities related to improving balance, coordination, kinesthetic sense, posture, motor skill, proprioception and motor activation to allow for proper function of core, proximal hip and LE with self care and ADLs  [x] (57790) Gait Re-education- Provided training and instruction to the patient for proper LE, core and proximal hip recruitment and positioning and eccentric body weight control with ambulation re-education including up and down stairs     Home Exercise Program:    [x] (94724) Reviewed/Progressed HEP activities related to strengthening, flexibility, endurance, ROM of core, proximal hip and LE for functional self-care, mobility, lifting and ambulation/stair navigation   [x] (45830)Reviewed/Progressed HEP activities related to improving balance, coordination, kinesthetic sense, posture, motor skill, proprioception of core, proximal hip and LE for self care, mobility, lifting, and ambulation/stair navigation      Manual Treatments:  PROM / STM / Oscillations-Mobs:  G-I, II, III, IV (PA's, Inf., Post.)  [x] (14411) Provided manual therapy to mobilize LE, proximal hip and/or LS spine soft tissue/joints for the purpose of modulating pain, promoting relaxation,  increasing ROM, reducing/eliminating soft tissue swelling/inflammation/restriction, improving soft tissue extensibility and allowing for proper ROM for normal function with self care, mobility, lifting and ambulation. Modalities:  . Deferred. Will ice at home. 2/14    Charges:  Timed Code Treatment Minutes: 45   Total Treatment Minutes: 45       [] EVAL (LOW) 53248 (typically 20 minutes face-to-face)   [] EVAL (MOD) 19233 (typically 30 minutes face-to-face)  [] EVAL (HIGH) 56569 (typically 45 minutes face-to-face)  [] RE-EVAL     [x] VW(56740) x 1    [] IONTO (70522)  [x] NMR (08278) x 1   [] VASO (24612)  [x] Manual (72627) x 1    [] Other:  [] TA (50987)x    [] Mech Traction (19649)  [] ES(attended) (77963)     [] ES (un) (71172):     GOALS:  Patient stated goal: Be able to do normal activities. [x] Progressing: [] Met: [] Not Met: [] Adjusted    Therapist goals for Patient:   Short Term Goals: To be achieved in: 2 weeks  1.  Independent in HEP and progression per patient tolerance, in order to prevent re-injury. [] Progressing: [x] Met: [] Not Met: [] Adjusted  2. Patient will have a decrease in pain to facilitate improvement in movement, function, and ADLs as indicated by Functional Deficits. [x] Progressing: [] Met: [] Not Met: [] Adjusted     Long Term Goals: To be achieved in: 8 weeks  1. Disability index score of 20% or less for the LEFS to assist with reaching prior level of function. [x] Progressing: [] Met: [] Not Met: [] Adjusted   2. Patient will demonstrate increased AROM to 0-120 to allow for proper joint functioning as indicated by patients Functional Deficits. [x] Progressing: [] Met: [] Not Met: [] Adjusted    3. Patient will demonstrate an increase in strength to good proximal hip strength and control, within 5lb HHD in LE to allow for proper functional mobility as indicated by patients Functional Deficits. [x] Progressing: [] Met: [] Not Met: [] Adjusted   4. Patient will demonstrate normal gait mechanics without increased symptoms or restriction to allow him to walk and perform all daily mobility. [x] Progressing: [] Met: [] Not Met: [] Adjusted    5. Patient will be able to navigate stairs up/down with reciprocal gait mechanics without increased symptoms or restriction to allow him normal household mobility without restricted access to certain parts of his home. [x] Progressing: [] Met: [] Not Met: [] Adjusted           Progression Towards Functional goals:  [x] Patient is progressing as expected towards functional goals listed. [] Progression is slowed due to complexities listed. [] Progression has been slowed due to co-morbidities. [] Plan just implemented, too soon to assess goals progression  [] Other:     ASSESSMENT:   Patient still noting some intermittent soreness/achiness in R medial knee primarily onset and lingering after any sort of rotational activity (OKC).   Did modify R knee loading tasks today to continue to address quad activation/strength deficits while avoiding joint irritation, particularly during modified BOSU lunges as these were what seemed to irritate and bring on R anterior medial achiness/soreness following previous session. Patient seemed to tolerate well. Session emphasis more on symptom relief and functional strength progression at this time. Finished session with BFR today following functional strength, stability, and balance tasks to fatigue and push patient's stamina with safe OKC tasks at end of session. Return to Play: (if applicable)   []  Stage 1: Intro to Strength   []  Stage 2: Return to Run and Strength   []  Stage 3: Return to Jump and Strength   []  Stage 4: Dynamic Strength and Agility   []  Stage 5: Sport Specific Training     []  Ready to Return to Play, Meets All Above Stages   []  Not Ready for Return to Sports   Comments:            Treatment/Activity Tolerance:  [x] Patient tolerated treatment well [] Patient limited by fatique  [] Patient limited by pain  [] Patient limited by other medical complications  [] Other:     Overall Progression Towards Functional goals/ Treatment Progress Update:  [x] Patient is progressing as expected towards functional goals listed. [] Progression is slowed due to complexities/Impairments listed. [] Progression has been slowed due to co-morbidities.   [] Plan just implemented, too soon to assess goals progression <30days   [] Goals require adjustment due to lack of progress  [] Patient is not progressing as expected and requires additional follow up with physician  [] Other    Prognosis for POC: [x] Good [] Fair  [] Poor    Patient requires continued skilled intervention: [x] Yes  [] No        PLAN:   [x] Continue per plan of care [] Alter current plan (see comments)  [] Plan of care initiated [] Hold pending MD visit [] Discharge    Electronically signed by: Monie Anderson, PT, DPT, MS, SCS    Note: If patient does not return for scheduled/recommended follow up visits, this note will serve as a discharge from care along with the most recent update on progress.

## 2022-02-16 ENCOUNTER — HOSPITAL ENCOUNTER (OUTPATIENT)
Dept: PHYSICAL THERAPY | Age: 62
Setting detail: THERAPIES SERIES
Discharge: HOME | End: 2022-02-16
Payer: COMMERCIAL

## 2022-02-16 PROCEDURE — 97140 MANUAL THERAPY 1/> REGIONS: CPT

## 2022-02-16 PROCEDURE — 97110 THERAPEUTIC EXERCISES: CPT

## 2022-02-16 PROCEDURE — 97112 NEUROMUSCULAR REEDUCATION: CPT

## 2022-02-16 NOTE — FLOWSHEET NOTE
[facility], [place]    Physical Therapy Treatment Note/ Progress Report:     Date:  2022    Patient Name:  Hue Hou    :  1960  MRN: [id number]  Medical/Treatment Diagnosis Information:  · Diagnosis: M23.306 Other meniscus derangements, unspecified meniscus, R knee; M25.561 R knee pain  · Treatment Diagnosis: M23.306 Other meniscus derangements, unspecified meniscus, R knee; M25.561 R knee pain  Insurance/Certification information:  PT Insurance Information: [facility]  Physician Information:  Referring Practitioner: Heather Ramirez  Plan of care signed (Y/N):     Date of Patient follow up with Physician: 22     Progress Report: []  Yes  [x]  No     Functional Scale: 79% disability - LEFS ()   Date: 22    Date Range for reporting period:  Beginnin21  Endin days or 10 visits    Progress report due (10 Rx/or 30 days whichever is less):      Recertification due (POC duration/ or 90 days whichever is less): 22    Visit # Insurance Allowable Auth Needed   55  (17 post-op for L knee) [facility] []Yes    [x]No     Pain level:  2-3/10 medial R knee soreness     SUBJECTIVE:   R knee soreness was present after previous session, but only lasted for 6-8 hours and then resolved before bed. No difficulty sleeping that night. Continues to practice going up/down the stairs in her home before they officially move upstairs this weekend to have the lower level floors replaced. States that she is doing pretty well going up with a step-to pattern, but she still struggles to go down unless she bumps down on her bottom. Also wants to be ready to travel to [place] to visit her mother-in-law in March.      OBJECTIVE: 22   Observation:    Test measurements:  127 degrees L knee flexion after stretching     HIP Abd      HIP Ext       HIP IR       HIP ER       Knee ext 0 0   Knee Flex 125 115   Ankle PF       Ankle DF       Ankle In Ankle Ev       Strength  LEFT RIGHT   HIP Flexors  4-/5 4-/5    HIP Abductors 4-/5 4/5   HIP Ext       Hip ER       Knee EXT (quad) 4-/5 4+/5    Knee Flex (HS) 4-/5 4/5   Ankle DF       Ankle PF       Ankle Inv       Ankle EV               Circumference  Mid apex  7 cm prox       46.0 cm  51.0 cm     45.5 cm  51.0 cm     Gait:  Patient using SPC for ambulation with slow daniel. RESTRICTIONS/PRECAUTIONS: High BP. Previous tobacco use (5 per day). R knee scope, meniscus repair on 9/17/21. L knee scope, meniscus repair on 12/17/21. Exercises/Interventions:     Therapeutic Exercise Resistance Sets/sec Reps Notes   Recumbent bike   5 min    Gastroc stretch on SB  30s 5    Hamstring stretch Long sitting 30s 5    Ankle pumps HEP   Heelslides w/strap Supine Pushing into the range more, bilat. Quad sets     SAQ over bolster    LAQ @EOB 0-90 2 10 Performed with BFR. See below. SLR flexion  2 10 Performed with BFR. See below. SLR abduction  2 10 Performed with BFR. See below. SLR adduction 2#    [email or web address] LOP (160 mmHg) Blue cuff 8 min30-15-15-15  SLR flexion/sidelying hip abd/LAQ/standing hamstring curls    **Performed at end of session starting 2/14          Leg press -SL 30# 2 10 bilat                          Therapeutic Activities       Step ups on 6\"  bilat 2 10 Cues to drive through heel, achieve TKE on R LE, slow control down during eccentric phase; Cues to lower herself down via R knee flexion rather than reaching for the floor with L LE   TRX squat taps to chair Elevated x1 foam    Gait training w/RW - NWBing on L LE  Worked on coordination of RW and R LE to maintain NWBIng on L LE following surgery coming up this Friday (12/17)                 Manual Intervention       R patella mobs - inferior, medial/lateral - supine, EOB   6 min Gr. II-III   R knee PROM - supine, EOB   6 min Pushing into flexion range more now.                                NMR Re-education       Biodex - limits of stability Medium skill level lvl 9 stability      CC TKE Resistance 3.5 5s 20 bilat   Mod. BOSU lunge isos (used airex on R only 2/16)  10s 8 bilat   Sport cord march (yellow) Fwd/bwd 2 10 Each position; To begin initiating SLS positions   SLS balance on airex     Reverse lunge w/slider              Access Code: [email or web address]  URL: [email or web address]  Date: 10/06/2021  Prepared by: Pauline Bedolla    Exercises    *In addition to pre-op exercises including hamstring stretch, gastroc stretch, heelslides, quad sets, SLR flexion. Spoke with Dr. Usama Rodriguez regarding the use of BFR with patient. Bloodflow restriction was utilized throughout exercise session with use of Emi Unit for a period of 8 minutes at  70% Occlusion. Patient's total limp [facility]. Therapeutic Exercise and NMR EXR  [x] (10077) Provided verbal/tactile cueing for activities related to strengthening, flexibility, endurance, ROM for improvements in LE, proximal hip, and core control with self care, mobility, lifting, ambulation. [x] (89948) Provided verbal/tactile cueing for activities related to improving balance, coordination, kinesthetic sense, posture, motor skill, proprioception  to assist with LE, proximal hip, and core control in self care, mobility, lifting, ambulation and eccentric single leg control.      NMR and Therapeutic Activities:    [x] (43651 or 31172) Provided verbal/tactile cueing for activities related to improving balance, coordination, kinesthetic sense, posture, motor skill, proprioception and motor activation to allow for proper function of core, proximal hip and LE with self care and ADLs  [x] (07185) Gait Re-education- Provided training and instruction to the patient for proper LE, core and proximal hip recruitment and positioning and eccentric body weight control with ambulation re-education including up and down stairs     Home Exercise Program:    [x] (91688) Reviewed/Progressed HEP activities related to strengthening, flexibility, endurance, ROM of core, proximal hip and LE for functional self-care, mobility, lifting and ambulation/stair navigation   [x] (27053)Reviewed/Progressed HEP activities related to improving balance, coordination, kinesthetic sense, posture, motor skill, proprioception of core, proximal hip and LE for self care, mobility, lifting, and ambulation/stair navigation      Manual Treatments:  PROM / STM / Oscillations-Mobs:  G-I, II, III, IV (PA's, Inf., Post.)  [x] (62342) Provided manual therapy to mobilize LE, proximal hip and/or LS spine soft tissue/joints for the purpose of modulating pain, promoting relaxation,  increasing ROM, reducing/eliminating soft tissue swelling/inflammation/restriction, improving soft tissue extensibility and allowing for proper ROM for normal function with self care, mobility, lifting and ambulation. Modalities:  . Deferred. Will ice at home. 2/16    Charges:  Timed Code Treatment Minutes: 45   Total Treatment Minutes: 45       [] EVAL (LOW) 84188 (typically 20 minutes face-to-face)   [] EVAL (MOD) 28820 (typically 30 minutes face-to-face)  [] EVAL (HIGH) 61769 (typically 45 minutes face-to-face)  [] RE-EVAL     [x] QL(90844) x 1    [] IONTO (37601)  [x] NMR (82777) x 1   [] VASO (77822)  [x] Manual (77668) x 1    [] Other:  [] TA (32328)x    [] Mech Traction (54706)  [] ES(attended) (48134)     [] ES (un) (20937):     GOALS:  Patient stated goal: Be able to do normal activities. [x] Progressing: [] Met: [] Not Met: [] Adjusted    Therapist goals for Patient:   Short Term Goals: To be achieved in: 2 weeks  1. Independent in HEP and progression per patient tolerance, in order to prevent re-injury. [] Progressing: [x] Met: [] Not Met: [] Adjusted  2.  Patient will have a decrease in pain to facilitate improvement in movement, function, and ADLs as indicated by Functional Deficits. [x] Progressing: [] Met: [] Not Met: [] Adjusted     Long Term Goals: To be achieved in: 8 weeks  1. Disability index score of 20% or less for the LEFS to assist with reaching prior level of function. [x] Progressing: [] Met: [] Not Met: [] Adjusted   2. Patient will demonstrate increased AROM to 0-120 to allow for proper joint functioning as indicated by patients Functional Deficits. [x] Progressing: [] Met: [] Not Met: [] Adjusted    3. Patient will demonstrate an increase in strength to good proximal hip strength and control, within 5lb HHD in LE to allow for proper functional mobility as indicated by patients Functional Deficits. [x] Progressing: [] Met: [] Not Met: [] Adjusted   4. Patient will demonstrate normal gait mechanics without increased symptoms or restriction to allow him to walk and perform all daily mobility. [x] Progressing: [] Met: [] Not Met: [] Adjusted    5. Patient will be able to navigate stairs up/down with reciprocal gait mechanics without increased symptoms or restriction to allow him normal household mobility without restricted access to certain parts of his home. [x] Progressing: [] Met: [] Not Met: [] Adjusted           Progression Towards Functional goals:  [x] Patient is progressing as expected towards functional goals listed. [] Progression is slowed due to complexities listed. [] Progression has been slowed due to co-morbidities. [] Plan just implemented, too soon to assess goals progression  [] Other:     ASSESSMENT:   Patient ambulating without AD. Mild gait deviations noted, specifically decreased knee flexion during swing phase. Incorporated cone ambulation cueing to improve knee flexion during swing phase bilaterally to normalize gait mechanics without AD. Patient reports positive response in R knee soreness following modifications in R knee loading tasks previous session.  Continued session emphasis more on symptom relief and functional strength progression at this time. Finished session with BFR today following functional strength, stability, and balance tasks to fatigue and push patient's stamina with safe OKC tasks at end of session. Will progress functional strength tasks at nv depending on R knee tolerance after today's session. Return to Play: (if applicable)   []  Stage 1: Intro to Strength   []  Stage 2: Return to Run and Strength   []  Stage 3: Return to Jump and Strength   []  Stage 4: Dynamic Strength and Agility   []  Stage 5: Sport Specific Training     []  Ready to Return to Play, Meets All Above Stages   []  Not Ready for Return to Sports   Comments:            Treatment/Activity Tolerance:  [x] Patient tolerated treatment well [] Patient limited by fatique  [] Patient limited by pain  [] Patient limited by other medical complications  [] Other:     Overall Progression Towards Functional goals/ Treatment Progress Update:  [x] Patient is progressing as expected towards functional goals listed. [] Progression is slowed due to complexities/Impairments listed. [] Progression has been slowed due to co-morbidities. [] Plan just implemented, too soon to assess goals progression <30days   [] Goals require adjustment due to lack of progress  [] Patient is not progressing as expected and requires additional follow up with physician  [] Other    Prognosis for POC: [x] Good [] Fair  [] Poor    Patient requires continued skilled intervention: [x] Yes  [] No        PLAN:   [x] Continue per plan of care [] Alter current plan (see comments)  [] Plan of care initiated [] Hold pending MD visit [] Discharge    Electronically signed by: Sy Escalante, PT, DPT, MS, SCS    Note: If patient does not return for scheduled/recommended follow up visits, this note will serve as a discharge from care along with the most recent update on progress.

## 2022-02-21 ENCOUNTER — HOSPITAL ENCOUNTER (OUTPATIENT)
Dept: PHYSICAL THERAPY | Age: 62
Setting detail: THERAPIES SERIES
Discharge: HOME | End: 2022-02-21
Payer: COMMERCIAL

## 2022-02-21 PROCEDURE — 97110 THERAPEUTIC EXERCISES: CPT

## 2022-02-21 PROCEDURE — 97112 NEUROMUSCULAR REEDUCATION: CPT

## 2022-02-21 PROCEDURE — 97140 MANUAL THERAPY 1/> REGIONS: CPT

## 2022-02-21 NOTE — FLOWSHEET NOTE
[facility], [place]    Physical Therapy Treatment Note/ Progress Report:     Date:  2022    Patient Name:  Austin Contreras    :  1960  MRN: [id number]  Medical/Treatment Diagnosis Information:  · Diagnosis: M23.306 Other meniscus derangements, unspecified meniscus, R knee; M25.561 R knee pain  · Treatment Diagnosis: M23.306 Other meniscus derangements, unspecified meniscus, R knee; M25.561 R knee pain  Insurance/Certification information:  PT Insurance Information: [facility]  Physician Information:  Referring Practitioner: Laura Knox  Plan of care signed (Y/N):     Date of Patient follow up with Physician: 22     Progress Report: []  Yes  [x]  No     Functional Scale: 79% disability - LEFS ()   Date: 22    Date Range for reporting period:  Beginnin21  Endin days or 10 visits    Progress report due (10 Rx/or 30 days whichever is less): 34     Recertification due (POC duration/ or 90 days whichever is less): 22    Visit # Insurance Allowable Auth Needed   52  (18 post-op for L knee) [facility] []Yes    [x]No     Pain level:  2-3/10 medial R knee soreness     SUBJECTIVE:   R knee still seems to be sore/achy on and off. Officially living in the upstairs portion of their house while the bottom floor has the carpet replaced with wood azael to improve aging in place. States she is doing pretty well going up with a step-to pattern, but she still struggles to go down unless she bumps down on her bottom. Also wants to be ready to travel to [place] to visit her mother-in-law in March.      OBJECTIVE: 22   Observation:    Test measurements:  127 degrees L knee flexion after stretching     HIP Abd      HIP Ext       HIP IR       HIP ER       Knee ext 0 0   Knee Flex 125 115   Ankle PF       Ankle DF       Ankle In       Ankle Ev       Strength  LEFT RIGHT   HIP Flexors  4-/5 4-/5    HIP Abductors 4-/5 4/5   HIP Ext Hip ER       Knee EXT (quad) 4-/5 4+/5    Knee Flex (HS) 4-/5 4/5   Ankle DF       Ankle PF       Ankle Inv       Ankle EV               Circumference  Mid apex  7 cm prox       46.0 cm  51.0 cm     45.5 cm  51.0 cm     Gait:  Patient using SPC for ambulation with slow daniel. RESTRICTIONS/PRECAUTIONS: High BP. Previous tobacco use (5 per day). R knee scope, meniscus repair on 9/17/21. L knee scope, meniscus repair on 12/17/21. Exercises/Interventions:     Therapeutic Exercise Resistance Sets/sec Reps Notes   Recumbent bike   5 min    Gastroc stretch on SB  30s 5    Hamstring stretch Long sitting 30s 5    Ankle pumps HEP   Heelslides w/strap Supine Pushing into the range more, bilat. Quad sets     SAQ over bolster    LAQ @EOB 0-90 2 10 Performed with BFR. See below. SLR flexion  2 10 Performed with BFR. See below. SLR abduction  2 10 Performed with BFR. See below. SLR adduction 2#    [email or web address] LOP (160 mmHg) Blue cuff 8 min30-15-15-15  SLR flexion/sidelying hip abd/LAQ/standing hamstring curls    **Performed at end of session starting 2/14          Leg press -SL 40# 2 10 bilat                          Therapeutic Activities       Step ups on 8\" for L  6\" for R  bilat 2 10 Cues to drive through heel, achieve TKE on R LE, slow control down during eccentric phase; Cues to lower herself down via R knee flexion rather than reaching for the floor with L LE   TRX squat taps to chair Elevated x1 foam    Gait training w/RW - NWBing on L LE  Worked on coordination of RW and R LE to maintain NWBIng on L LE following surgery coming up this Friday (12/17)                 Manual Intervention       Patella mobs - inferior, medial/lateral - supine, EOB bilat  6 min Gr. II-III   Knee PROM - supine, EOB bilat  6 min Pushing into flexion range more now.                                NMR Re-education       Biodex - limits of stability   Medium skill level lvl 9 stability      CC TKE Resistance 3.5 5s 20 bilat   Mod. BOSU lunge isos (used airex on R only 2/21)  10s 8 bilat   Sport cord march (yellow) Fwd/bwd 2 10 Each position; To begin initiating SLS positions   SLS balance on airex  10s 5 bilat   Reverse lunge w/slider              Access Code: [email or web address]  URL: [email or web address]  Date: 10/06/2021  Prepared by: Zen Awad    Exercises    *In addition to pre-op exercises including hamstring stretch, gastroc stretch, heelslides, quad sets, SLR flexion. Spoke with Dr. Cass Saavedra regarding the use of BFR with patient. Bloodflow restriction was utilized throughout exercise session with use of Emi Unit for a period of 8 minutes at 70% Occlusion. Patient's total limp occlusion [facility] entire time of occlusion. Therapeutic Exercise and NMR EXR  [x] (45277) Provided verbal/tactile cueing for activities related to strengthening, flexibility, endurance, ROM for improvements in LE, proximal hip, and core control with self care, mobility, lifting, ambulation. [x] (36350) Provided verbal/tactile cueing for activities related to improving balance, coordination, kinesthetic sense, posture, motor skill, proprioception  to assist with LE, proximal hip, and core control in self care, mobility, lifting, ambulation and eccentric single leg control.      NMR and Therapeutic Activities:    [x] (22530 or 31791) Provided verbal/tactile cueing for activities related to improving balance, coordination, kinesthetic sense, posture, motor skill, proprioception and motor activation to allow for proper function of core, proximal hip and LE with self care and ADLs  [x] (21488) Gait Re-education- Provided training and instruction to the patient for proper LE, core and proximal hip recruitment and positioning and eccentric body weight control with ambulation re-education including up and down stairs     Home Exercise Program:    [x] (60763) Reviewed/Progressed HEP activities related to strengthening, flexibility, endurance, ROM of core, proximal hip and LE for functional self-care, mobility, lifting and ambulation/stair navigation   [x] (70014)Reviewed/Progressed HEP activities related to improving balance, coordination, kinesthetic sense, posture, motor skill, proprioception of core, proximal hip and LE for self care, mobility, lifting, and ambulation/stair navigation      Manual Treatments:  PROM / STM / Oscillations-Mobs:  G-I, II, III, IV (PA's, Inf., Post.)  [x] (89651) Provided manual therapy to mobilize LE, proximal hip and/or LS spine soft tissue/joints for the purpose of modulating pain, promoting relaxation,  increasing ROM, reducing/eliminating soft tissue swelling/inflammation/restriction, improving soft tissue extensibility and allowing for proper ROM for normal function with self care, mobility, lifting and ambulation. Modalities:  . Deferred. Will ice at home. 2/21    Charges:  Timed Code Treatment Minutes: 45   Total Treatment Minutes: 45       [] EVAL (LOW) 17763 (typically 20 minutes face-to-face)   [] EVAL (MOD) 08513 (typically 30 minutes face-to-face)  [] EVAL (HIGH) 52998 (typically 45 minutes face-to-face)  [] RE-EVAL     [x] KE(80425) x 1    [] IONTO (39064)  [x] NMR (26246) x 1   [] VASO (27767)  [x] Manual (15548) x 1    [] Other:  [] TA (58191)x    [] Mech Traction (13738)  [] ES(attended) (65627)     [] ES (un) (13405):     GOALS:  Patient stated goal: Be able to do normal activities. [x] Progressing: [] Met: [] Not Met: [] Adjusted    Therapist goals for Patient:   Short Term Goals: To be achieved in: 2 weeks  1. Independent in HEP and progression per patient tolerance, in order to prevent re-injury. [] Progressing: [x] Met: [] Not Met: [] Adjusted  2. Patient will have a decrease in pain to facilitate improvement in movement, function, and ADLs as indicated by Functional Deficits. [x] Progressing: [] Met: [] Not Met: [] Adjusted     Long Term Goals:  To be achieved in: 8 weeks  1. Disability index score of 20% or less for the LEFS to assist with reaching prior level of function. [x] Progressing: [] Met: [] Not Met: [] Adjusted   2. Patient will demonstrate increased AROM to 0-120 to allow for proper joint functioning as indicated by patients Functional Deficits. [x] Progressing: [] Met: [] Not Met: [] Adjusted    3. Patient will demonstrate an increase in strength to good proximal hip strength and control, within 5lb HHD in LE to allow for proper functional mobility as indicated by patients Functional Deficits. [x] Progressing: [] Met: [] Not Met: [] Adjusted   4. Patient will demonstrate normal gait mechanics without increased symptoms or restriction to allow him to walk and perform all daily mobility. [x] Progressing: [] Met: [] Not Met: [] Adjusted    5. Patient will be able to navigate stairs up/down with reciprocal gait mechanics without increased symptoms or restriction to allow him normal household mobility without restricted access to certain parts of his home. [x] Progressing: [] Met: [] Not Met: [] Adjusted           Progression Towards Functional goals:  [x] Patient is progressing as expected towards functional goals listed. [] Progression is slowed due to complexities listed. [] Progression has been slowed due to co-morbidities. [] Plan just implemented, too soon to assess goals progression  [] Other:     ASSESSMENT:  Continued session emphasis on functional strength progression. Finishing sessions with BFR following functional strength, stability, and balance tasks to fatigue and push patient's stamina and endurance. Plan on alternating BFR on L and then R knee due to patient having more discomfort and functional difficulty on the R knee.       Return to Play: (if applicable)   []  Stage 1: Intro to Strength   []  Stage 2: Return to Run and Strength   []  Stage 3: Return to Jump and Strength   []  Stage 4: Dynamic Strength and Agility   [] Stage 5: Sport Specific Training     []  Ready to Return to Play, Meets All Above Stages   []  Not Ready for Return to Sports   Comments:            Treatment/Activity Tolerance:  [x] Patient tolerated treatment well [] Patient limited by fatique  [] Patient limited by pain  [] Patient limited by other medical complications  [] Other:     Overall Progression Towards Functional goals/ Treatment Progress Update:  [x] Patient is progressing as expected towards functional goals listed. [] Progression is slowed due to complexities/Impairments listed. [] Progression has been slowed due to co-morbidities. [] Plan just implemented, too soon to assess goals progression <30days   [] Goals require adjustment due to lack of progress  [] Patient is not progressing as expected and requires additional follow up with physician  [] Other    Prognosis for POC: [x] Good [] Fair  [] Poor    Patient requires continued skilled intervention: [x] Yes  [] No        PLAN:   [x] Continue per plan of care [] Alter current plan (see comments)  [] Plan of care initiated [] Hold pending MD visit [] Discharge    Electronically signed by: Thang Rocha PT, DPT, MS, SCS    Note: If patient does not return for scheduled/recommended follow up visits, this note will serve as a discharge from care along with the most recent update on progress.

## 2022-02-23 ENCOUNTER — HOSPITAL ENCOUNTER (OUTPATIENT)
Dept: PHYSICAL THERAPY | Age: 62
Setting detail: THERAPIES SERIES
Discharge: HOME | End: 2022-02-23
Payer: COMMERCIAL

## 2022-02-23 PROCEDURE — 97110 THERAPEUTIC EXERCISES: CPT

## 2022-02-23 PROCEDURE — 97112 NEUROMUSCULAR REEDUCATION: CPT

## 2022-02-23 PROCEDURE — 97140 MANUAL THERAPY 1/> REGIONS: CPT

## 2022-02-23 NOTE — FLOWSHEET NOTE
Ankle Ev       Strength  LEFT RIGHT   HIP Flexors  4-/5 4-/5    HIP Abductors 4-/5 4/5   HIP Ext       Hip ER       Knee EXT (quad) 4-/5 4+/5    Knee Flex (HS) 4-/5 4/5   Ankle DF       Ankle PF       Ankle Inv       Ankle EV               Circumference  Mid apex  7 cm prox       46.0 cm  51.0 cm     45.5 cm  51.0 cm     Gait:  Patient using SPC for ambulation with slow daniel. RESTRICTIONS/PRECAUTIONS: High BP. Previous tobacco use (5 per day). R knee scope, meniscus repair on 9/17/21. L knee scope, meniscus repair on 12/17/21. Exercises/Interventions:     Therapeutic Exercise Resistance Sets/sec Reps Notes   Recumbent bike   5 min    Gastroc stretch on SB  30s 5    Hamstring stretch Long sitting 30s 5    Ankle pumps HEP   Heelslides w/strap Supine Pushing into the range more, bilat. Quad sets     SAQ over bolster    LAQ @EOB 0-90 2 10 Performed with BFR. See below. SLR flexion  2 10 Performed with BFR. See below. SLR abduction  2 10 Performed with BFR. See below. SLR adduction 2#    [email or web address] LOP (160 mmHg) Blue cuff 8 min30-15-15-15  SLR flexion/sidelying hip abd/LAQ/standing hamstring curls    **Performed at end of session starting 2/14          Leg press -SL 40# 2 10 bilat                          Therapeutic Activities       Step ups on 8\"  bilat 2  1 10 L  10 R Cues to drive through heel, achieve TKE on R LE, slow control down during eccentric phase; Cues to lower herself down via R knee flexion rather than reaching for the floor with L LE   TRX squat taps to chair Elevated x1 foam    Gait training w/RW - NWBing on L LE  Worked on coordination of RW and R LE to maintain NWBIng on L LE following surgery coming up this Friday (12/17)                 Manual Intervention       Patella mobs - inferior, medial/lateral - supine, EOB bilat  6 min Gr. II-III   Knee PROM - supine, EOB bilat  6 min Pushing into flexion range more now.                                NMR Re-education       Biodex - limits of stability   Medium skill level lvl 9 stability      CC TKE Resistance 3.5 5s 20 bilat   Mod. BOSU lunge isos (used airex on R only 2/21)  10s 8 bilat   Sport cord march (yellow) Fwd/bwd 2 10 Each position; To begin initiating SLS positions   SLS balance on airex  10s 5 bilat   Reverse lunge w/slider              Access Code: [email or web address]  URL: [email or web address]  Date: 10/06/2021  Prepared by: Alpa Jones    Exercises    *In addition to pre-op exercises including hamstring stretch, gastroc stretch, heelslides, quad sets, SLR flexion. Spoke with Dr. Justine Campos regarding the use of BFR with patient. Bloodflow restriction was utilized throughout exercise session with use of Emi Unit for a period of 8 minutes at 70% Occlusion. Patient's total limp occlusion pressure [facility] calculated [facility] the entire time of occlusion. Therapeutic Exercise and NMR EXR  [x] (12276) Provided verbal/tactile cueing for activities related to strengthening, flexibility, endurance, ROM for improvements in LE, proximal hip, and core control with self care, mobility, lifting, ambulation. [x] (91545) Provided verbal/tactile cueing for activities related to improving balance, coordination, kinesthetic sense, posture, motor skill, proprioception  to assist with LE, proximal hip, and core control in self care, mobility, lifting, ambulation and eccentric single leg control.      NMR and Therapeutic Activities:    [x] (70417 or 48886) Provided verbal/tactile cueing for activities related to improving balance, coordination, kinesthetic sense, posture, motor skill, proprioception and motor activation to allow for proper function of core, proximal hip and LE with self care and ADLs  [x] (97395) Gait Re-education- Provided training and instruction to the patient for proper LE, core and proximal hip recruitment and positioning and eccentric body weight control with ambulation re-education including up and down stairs     Home Exercise Program:    [x] (46794) Reviewed/Progressed HEP activities related to strengthening, flexibility, endurance, ROM of core, proximal hip and LE for functional self-care, mobility, lifting and ambulation/stair navigation   [x] (00307)Reviewed/Progressed HEP activities related to improving balance, coordination, kinesthetic sense, posture, motor skill, proprioception of core, proximal hip and LE for self care, mobility, lifting, and ambulation/stair navigation      Manual Treatments:  PROM / STM / Oscillations-Mobs:  G-I, II, III, IV (PA's, Inf., Post.)  [x] (47813) Provided manual therapy to mobilize LE, proximal hip and/or LS spine soft tissue/joints for the purpose of modulating pain, promoting relaxation,  increasing ROM, reducing/eliminating soft tissue swelling/inflammation/restriction, improving soft tissue extensibility and allowing for proper ROM for normal function with self care, mobility, lifting and ambulation. Modalities:  . Deferred. Will ice at home. 2/23    Charges:  Timed Code Treatment Minutes: 45   Total Treatment Minutes: 45       [] EVAL (LOW) 40471 (typically 20 minutes face-to-face)   [] EVAL (MOD) 17358 (typically 30 minutes face-to-face)  [] EVAL (HIGH) 50158 (typically 45 minutes face-to-face)  [] RE-EVAL     [x] SC(63269) x 1    [] IONTO (84801)  [x] NMR (82291) x 1   [] VASO (16139)  [x] Manual (97332) x 1    [] Other:  [] TA (53300)x    [] Mech Traction (49998)  [] ES(attended) (73781)     [] ES (un) (29327):     GOALS:  Patient stated goal: Be able to do normal activities. [x] Progressing: [] Met: [] Not Met: [] Adjusted    Therapist goals for Patient:   Short Term Goals: To be achieved in: 2 weeks  1. Independent in HEP and progression per patient tolerance, in order to prevent re-injury. [] Progressing: [x] Met: [] Not Met: [] Adjusted  2.  Patient will have a decrease in pain to facilitate improvement in movement, function, and ADLs as indicated by Functional Deficits. [x] Progressing: [] Met: [] Not Met: [] Adjusted     Long Term Goals: To be achieved in: 8 weeks  1. Disability index score of 20% or less for the LEFS to assist with reaching prior level of function. [x] Progressing: [] Met: [] Not Met: [] Adjusted   2. Patient will demonstrate increased AROM to 0-120 to allow for proper joint functioning as indicated by patients Functional Deficits. [x] Progressing: [] Met: [] Not Met: [] Adjusted    3. Patient will demonstrate an increase in strength to good proximal hip strength and control, within 5lb HHD in LE to allow for proper functional mobility as indicated by patients Functional Deficits. [x] Progressing: [] Met: [] Not Met: [] Adjusted   4. Patient will demonstrate normal gait mechanics without increased symptoms or restriction to allow him to walk and perform all daily mobility. [x] Progressing: [] Met: [] Not Met: [] Adjusted    5. Patient will be able to navigate stairs up/down with reciprocal gait mechanics without increased symptoms or restriction to allow him normal household mobility without restricted access to certain parts of his home. [x] Progressing: [] Met: [] Not Met: [] Adjusted           Progression Towards Functional goals:  [x] Patient is progressing as expected towards functional goals listed. [] Progression is slowed due to complexities listed. [] Progression has been slowed due to co-morbidities. [] Plan just implemented, too soon to assess goals progression  [] Other:     ASSESSMENT:  R knee soreness/achiness progressively improving. Patient consistently ambulating without AD now. Gait mechanics normalized -- mild R anterior medial knee soreness noted with ambulation. Continued session emphasis on functional strength progression.  Patient able to progress to step ups on 8\" (normal) step height on the R today with significant focus on driving up through R LE using quad and glut without compensating and assisting by pushing off the L LE. Finished session with BFR on R knee today due to patient having more discomfort and functional difficulty on the R knee. Return to Play: (if applicable)   []  Stage 1: Intro to Strength   []  Stage 2: Return to Run and Strength   []  Stage 3: Return to Jump and Strength   []  Stage 4: Dynamic Strength and Agility   []  Stage 5: Sport Specific Training     []  Ready to Return to Play, Meets All Above Stages   []  Not Ready for Return to Sports   Comments:            Treatment/Activity Tolerance:  [x] Patient tolerated treatment well [] Patient limited by fatique  [] Patient limited by pain  [] Patient limited by other medical complications  [] Other:     Overall Progression Towards Functional goals/ Treatment Progress Update:  [x] Patient is progressing as expected towards functional goals listed. [] Progression is slowed due to complexities/Impairments listed. [] Progression has been slowed due to co-morbidities. [] Plan just implemented, too soon to assess goals progression <30days   [] Goals require adjustment due to lack of progress  [] Patient is not progressing as expected and requires additional follow up with physician  [] Other    Prognosis for POC: [x] Good [] Fair  [] Poor    Patient requires continued skilled intervention: [x] Yes  [] No        PLAN:   [x] Continue per plan of care [] Alter current plan (see comments)  [] Plan of care initiated [] Hold pending MD visit [] Discharge    Electronically signed by: Robles Huynh, PT, DPT, MS, SCS    Note: If patient does not return for scheduled/recommended follow up visits, this note will serve as a discharge from care along with the most recent update on progress.

## 2022-02-28 ENCOUNTER — HOSPITAL ENCOUNTER (OUTPATIENT)
Dept: PHYSICAL THERAPY | Age: 62
Setting detail: THERAPIES SERIES
Discharge: HOME | End: 2022-02-28
Payer: COMMERCIAL

## 2022-02-28 PROCEDURE — 97110 THERAPEUTIC EXERCISES: CPT

## 2022-02-28 PROCEDURE — 97530 THERAPEUTIC ACTIVITIES: CPT

## 2022-02-28 PROCEDURE — 97140 MANUAL THERAPY 1/> REGIONS: CPT

## 2022-02-28 PROCEDURE — 97112 NEUROMUSCULAR REEDUCATION: CPT

## 2022-02-28 NOTE — FLOWSHEET NOTE
[facility], [place]    Physical Therapy Treatment Note/ Progress Report:     Date:  2022    Patient Name:  Antoni Escalante    :  1960  MRN: [id number]  Medical/Treatment Diagnosis Information:  · Diagnosis: M23.306 Other meniscus derangements, unspecified meniscus, R knee; M25.561 R knee pain  · Treatment Diagnosis: M23.306 Other meniscus derangements, unspecified meniscus, R knee; M25.561 R knee pain  Insurance/Certification information:  PT Insurance Information: [facility]  Physician Information:  Referring Practitioner: Maris Hobson  Plan of care signed (Y/N):     Date of Patient follow up with Physician: 22     Progress Report: []  Yes  [x]  No     Functional Scale: 79% disability - LEFS ()   Date: 22    Date Range for reporting period:  Beginnin21  Endin days or 10 visits    Progress report due (10 Rx/or 30 days whichever is less): 22 Progress note nv. Recertification due (POC duration/ or 90 days whichever is less): 22    Visit # Insurance Allowable Auth Needed   52  (20 post-op for L knee) [facility] []Yes    [x]No     Pain level:  1-2/10 medial R knee soreness     SUBJECTIVE:   R knee soreness/achiness progressively improving after each session. The new floors in the first level of her home are almost finished. The movers are coming today to move furniture back onto the lower level. Admits she has been doing a lot more walking. Not having any major issues with increased walking. Wants to be ready to travel to [place] to visit her mother-in-law . Feels like she is going to be ready. Stairs are progressively getting easier. Going down is still more challenging on the R. Has not attempted to get down on the floor, but does need to be able to do that eventually so she can get stuff out from under her bed.     OBJECTIVE: 22   Observation:    Test measurements:  127 degrees L knee flexion after stretching     HIP Abd      HIP Ext       HIP IR       HIP ER       Knee ext 0 0   Knee Flex 125 115   Ankle PF       Ankle DF       Ankle In       Ankle Ev       Strength  LEFT RIGHT   HIP Flexors  4-/5 4-/5    HIP Abductors 4-/5 4/5   HIP Ext       Hip ER       Knee EXT (quad) 4-/5 4+/5    Knee Flex (HS) 4-/5 4/5   Ankle DF       Ankle PF       Ankle Inv       Ankle EV               Circumference  Mid apex  7 cm prox       46.0 cm  51.0 cm     45.5 cm  51.0 cm     Gait:  Patient using SPC for ambulation with slow daniel. RESTRICTIONS/PRECAUTIONS: High BP. Previous tobacco use (5 per day). R knee scope, meniscus repair on 9/17/21. L knee scope, meniscus repair on 12/17/21. Exercises/Interventions:     Therapeutic Exercise Resistance Sets/sec Reps Notes   Recumbent bike 3  10 min    Gastroc stretch on SB  30s 5    Hamstring stretch Long sitting 30s 5    Ankle pumps HEP   Heelslides w/strap Supine Pushing into the range more, bilat. Quad sets     SAQ over bolster    LAQ @EOB 0-90 2 10 Performed with BFR. See below. SLR flexion  2 10 Performed with BFR. See below. SLR abduction  2 10 Performed with BFR. See below. SLR adduction 2#    [email or web address] LOP (160 mmHg) Blue cuff 8 min30-15-15-15  SLR flexion/sidelying hip abd/LAQ/standing hamstring curls    **Performed at end of session starting 2/14          Leg press -SL 50# 2 10 bilat                          Therapeutic Activities       Step ups on 8\"/downs on 4\" for R only  bilat 2   10    ecc focus   TRX squat taps to chair Elevated x1 foam 2 10                       Manual Intervention       Patella mobs - inferior, medial/lateral - supine, EOB bilat  6 min Gr. II-III   Knee PROM - supine, EOB bilat  6 min Pushing into flexion range more now. NMR Re-education       Biodex - limits of stability   Medium skill level lvl 9 stability      CC TKE Resistance 3.5 5s 20 bilat   Mod.  BOSU lunge isos   10s 8 bilat   Sport cord march (yellow) Fwd/bwd 2 10 Each position; To begin initiating SLS positions   SLS balance on airex  10s 5 bilat   Reverse lunge w/slider              Access Code: [email or web address]  URL: [email or web address]  Date: 10/06/2021  Prepared by: Bisi Corrales    Exercises    *In addition to pre-op exercises including hamstring stretch, gastroc stretch, heelslides, quad sets, SLR flexion. Spoke with Dr. Vinny Worrell regarding the use of BFR with patient. Bloodflow restriction was utilized throughout exercise session with use of Em[facility] Unit for a period of 8 minutes at 70% Occlusion. Patient's total limp occlusion pressure was calculated and [facility] for the entire time of occlusion. Therapeutic Exercise and NMR EXR  [x] (97941) Provided verbal/tactile cueing for activities related to strengthening, flexibility, endurance, ROM for improvements in LE, proximal hip, and core control with self care, mobility, lifting, ambulation. [x] (30837) Provided verbal/tactile cueing for activities related to improving balance, coordination, kinesthetic sense, posture, motor skill, proprioception  to assist with LE, proximal hip, and core control in self care, mobility, lifting, ambulation and eccentric single leg control.      NMR and Therapeutic Activities:    [x] (85967 or 86331) Provided verbal/tactile cueing for activities related to improving balance, coordination, kinesthetic sense, posture, motor skill, proprioception and motor activation to allow for proper function of core, proximal hip and LE with self care and ADLs  [x] (26463) Gait Re-education- Provided training and instruction to the patient for proper LE, core and proximal hip recruitment and positioning and eccentric body weight control with ambulation re-education including up and down stairs     Home Exercise Program:    [x] (13520) Reviewed/Progressed HEP activities related to strengthening, flexibility, endurance, ROM of core, proximal hip and LE for functional self-care, mobility, lifting and ambulation/stair navigation   [x] (97384)Reviewed/Progressed HEP activities related to improving balance, coordination, kinesthetic sense, posture, motor skill, proprioception of core, proximal hip and LE for self care, mobility, lifting, and ambulation/stair navigation      Manual Treatments:  PROM / STM / Oscillations-Mobs:  G-I, II, III, IV (PA's, Inf., Post.)  [x] (09005) Provided manual therapy to mobilize LE, proximal hip and/or LS spine soft tissue/joints for the purpose of modulating pain, promoting relaxation,  increasing ROM, reducing/eliminating soft tissue swelling/inflammation/restriction, improving soft tissue extensibility and allowing for proper ROM for normal function with self care, mobility, lifting and ambulation. Modalities:  . Deferred. Will ice at home. 2/28    Charges:  Timed Code Treatment Minutes: 55   Total Treatment Minutes: 55       [] EVAL (LOW) 25873 (typically 20 minutes face-to-face)   [] EVAL (MOD) 73292 (typically 30 minutes face-to-face)  [] EVAL (HIGH) 16290 (typically 45 minutes face-to-face)  [] RE-EVAL     [x] VB(06728) x 1    [] IONTO (06960)  [x] NMR (05701) x 1   [] VASO (68150)  [x] Manual (57560) x 1    [] Other:  [x] TA (16672)x 1   [] Mech Traction (86976)  [] ES(attended) (20145)     [] ES (un) (32241):     GOALS:  Patient stated goal: Be able to do normal activities. [x] Progressing: [] Met: [] Not Met: [] Adjusted    Therapist goals for Patient:   Short Term Goals: To be achieved in: 2 weeks  1. Independent in HEP and progression per patient tolerance, in order to prevent re-injury. [] Progressing: [x] Met: [] Not Met: [] Adjusted  2. Patient will have a decrease in pain to facilitate improvement in movement, function, and ADLs as indicated by Functional Deficits. [x] Progressing: [] Met: [] Not Met: [] Adjusted     Long Term Goals: To be achieved in: 8 weeks  1.  Disability index score of 20% or less for the LEFS to assist with reaching prior level of function. [x] Progressing: [] Met: [] Not Met: [] Adjusted   2. Patient will demonstrate increased AROM to 0-120 to allow for proper joint functioning as indicated by patients Functional Deficits. [x] Progressing: [] Met: [] Not Met: [] Adjusted    3. Patient will demonstrate an increase in strength to good proximal hip strength and control, within 5lb HHD in LE to allow for proper functional mobility as indicated by patients Functional Deficits. [x] Progressing: [] Met: [] Not Met: [] Adjusted   4. Patient will demonstrate normal gait mechanics without increased symptoms or restriction to allow him to walk and perform all daily mobility. [x] Progressing: [] Met: [] Not Met: [] Adjusted    5. Patient will be able to navigate stairs up/down with reciprocal gait mechanics without increased symptoms or restriction to allow him normal household mobility without restricted access to certain parts of his home. [x] Progressing: [] Met: [] Not Met: [] Adjusted           Progression Towards Functional goals:  [x] Patient is progressing as expected towards functional goals listed. [] Progression is slowed due to complexities listed. [] Progression has been slowed due to co-morbidities. [] Plan just implemented, too soon to assess goals progression  [] Other:     ASSESSMENT:  R knee achiness/soreness progressively improving following each session. Continued focus on functional strength progression. Patient able to progress to step ups/downs today to begin addressing continued complaints of difficulty with going down stairs. Had to modify on the R due to more difficulty and eccentric strength/control deficits on the R compared to L. Also able to initiate TRX squat taps on elevated chair as well. Finished session with BFR on R knee again today due to patient consistently having more discomfort and functional difficulty on the R knee.       Return to Play: (if applicable)   []  Stage 1: Intro to Strength   []  Stage 2: Return to Run and Strength   []  Stage 3: Return to Jump and Strength   []  Stage 4: Dynamic Strength and Agility   []  Stage 5: Sport Specific Training     []  Ready to Return to Play, Meets All Above Stages   []  Not Ready for Return to Sports   Comments:            Treatment/Activity Tolerance:  [x] Patient tolerated treatment well [] Patient limited by fatique  [] Patient limited by pain  [] Patient limited by other medical complications  [] Other:     Overall Progression Towards Functional goals/ Treatment Progress Update:  [x] Patient is progressing as expected towards functional goals listed. [] Progression is slowed due to complexities/Impairments listed. [] Progression has been slowed due to co-morbidities. [] Plan just implemented, too soon to assess goals progression <30days   [] Goals require adjustment due to lack of progress  [] Patient is not progressing as expected and requires additional follow up with physician  [] Other    Prognosis for POC: [x] Good [] Fair  [] Poor    Patient requires continued skilled intervention: [x] Yes  [] No        PLAN:   [x] Continue per plan of care [] Alter current plan (see comments)  [] Plan of care initiated [] Hold pending MD visit [] Discharge    Electronically signed by: Robles Huynh, PT, DPT, MS, SCS    Note: If patient does not return for scheduled/recommended follow up visits, this note will serve as a discharge from care along with the most recent update on progress.

## 2022-03-02 ENCOUNTER — HOSPITAL ENCOUNTER (OUTPATIENT)
Dept: PHYSICAL THERAPY | Age: 62
Setting detail: THERAPIES SERIES
Discharge: HOME | End: 2022-03-02
Payer: COMMERCIAL

## 2022-03-02 PROCEDURE — 97140 MANUAL THERAPY 1/> REGIONS: CPT

## 2022-03-02 PROCEDURE — 97110 THERAPEUTIC EXERCISES: CPT

## 2022-03-02 PROCEDURE — 97112 NEUROMUSCULAR REEDUCATION: CPT

## 2022-03-02 PROCEDURE — 97530 THERAPEUTIC ACTIVITIES: CPT

## 2022-03-02 NOTE — PLAN OF CARE
Ben, [facility]      Physical Therapy Re-Certification Plan of Care/MD UPDATE      Dear  Dr. Kishore Joiner,    We had the pleasure of treating the following patient for physical therapy services at [address] [place] [address]. A summary of our findings can be found in the updated assessment below. This includes our plan of care. If you have any questions or concerns regarding these findings, please do not hesitate to contact me at the office phone number checked above. Thank you for the referral.     Physician Signature:________________________________Date:__________________  By signing above (or electronic signature), therapists plan is approved by physician    Date Range Of Visits: 1/29/22 to 3/2/22  Total Visits to Date: 48  Overall Response to Treatment:   [x]Patient is responding well to treatment and improvement is noted with regards  to goals   []Patient should continue to improve in reasonable time if they continue HEP   []Patient has plateaued and is no longer responding to skilled PT intervention    []Patient is getting worse and would benefit from return to referring MD   []Patient unable to adhere to initial POC   [x]Other:  Patient has been seen for 50 PT visits thus far (21 for the most recent L knee surgery) following both a R and a L meniscal root repair staged 12 weeks apart. Overall, patient has seen very good improvement in pain and function. Reports subjective improvement in function from 11% function to 51% function on the LEFS. Patient is now ambulating without assistive device with normal gait mechanics. R and L knee ROM has returned to pre-surgery levels at this time, however, R knee does still have some stiffness/tightness with end range flexion. Patient still reporting some intermittent R knee pain typically with prolonged activities such as standing, walking, and going down>up stairs.   These complaints are consistent with findings of greater strength limitations on the R knee compared to the L. Patient will continue to benefit from skilled PT to address remaining sxs, strength deficits, and functional limitations to enable full, safe return to OF. Physical Therapy Treatment Note/ Progress Report:     Date:  3/2/2022    Patient Name:  Kevin Hodgson    :  1960  MRN: [id number]  Medical/Treatment Diagnosis Information:  · Diagnosis: M23.306 Other meniscus derangements, unspecified meniscus, R knee; M25.561 R knee pain  · Treatment Diagnosis: M23.306 Other meniscus derangements, unspecified meniscus, R knee; M25.561 R knee pain  Insurance/Certification information:  PT Insurance Information: [facility]  Physician Information:  Referring Practitioner: Aziza Christopher  Plan of care signed (Y/N):     Date of Patient follow up with Physician:      Progress Report: [x]  Yes  []  No     Functional Scale: 49% disability - LEFS (41/80)   Date: 3/2/22    Date Range for reporting period:  Beginnin21  Endin days or 10 visits    Progress report due (10 Rx/or 30 days whichever is less): 03    Recertification due (POC duration/ or 90 days whichever is less): 22    Visit # Insurance Allowable Auth Needed   50  (21 post-op for L knee) [facility] []Yes    [x]No     Pain level:  1-2/10 intermittent medial R knee soreness     SUBJECTIVE:  The movers got all of the furniture back onto the lower level yesterday now that the floors are finished, so patient has been very busy getting boxes unpacked and items put away. R knee typically gets sore and achy by the end of the day. Wants to be ready to travel to [place] to visit her mother-in-law . Feels like she is going to be ready. Stairs are progressively getting easier. Going down is still more challenging on the R. Has not attempted to get down on the floor, but does need to be able to do that eventually so she can get stuff out from under her bed.     OBJECTIVE: 3/2/22   Observation:    Test measurements:  127 degrees L knee flexion after stretching     HIP Abd      HIP Ext       HIP IR       HIP ER       Knee ext 0 0   Knee Flex 123 125   Ankle PF       Ankle DF       Ankle In       Ankle Ev       Strength  LEFT RIGHT   HIP Flexors  22.6# 26.8#   HIP Abductors 11.8# 13.6#   HIP Ext       Hip ER       Knee EXT (quad) 18.5# p! 32.3#   Knee Flex (HS) 33.5# 28.6#   Ankle DF       Ankle PF       Ankle Inv       Ankle EV               Circumference  Mid apex  7 cm prox       46.0 cm  51.0 cm     45.5 cm  51.0 cm     Gait:  Ambulating without AD; normal mechanics. RESTRICTIONS/PRECAUTIONS: High BP. Previous tobacco use (5 per day). R knee scope, meniscus repair on 9/17/21. L knee scope, meniscus repair on 12/17/21. Exercises/Interventions:     Therapeutic Exercise Resistance Sets/sec Reps Notes   Recumbent bike 3  10 min    Gastroc stretch on SB  30s 5    Hamstring stretch Long sitting 30s 5    Ankle pumps HEP   Heelslides w/strap Supine Pushing into the range more, bilat. Quad sets     SAQ over bolster    LAQ @EOB 0-90 2 10 Performed with BFR. See below. SLR flexion  2 10 Performed with BFR. See below. SLR abduction  2 10 Performed with BFR. See below. SLR adduction 2#    [email or web address] LOP (160 mmHg) Blue cuff 30-15-15-15  SLR flexion/sidelying hip abd/LAQ/standing hamstring curls    **Performed at end of session starting 2/14          Leg press -SL 50# 2 10 bilat   Quad extension isos - Cybex  60 deg angle on L  45 deg angle on R  10s 10 Each LE individually  R does not tolerate as well due to p!    Hamstring curls - Cybex 30# 2 10 bilat                   Therapeutic Activities       Step ups on 8\"/downs on 4\" for R only  bilat 2   10    ecc focus   TRX squat taps to chair Elevated x1 foam 2 10                       Manual Intervention       Patella mobs - inferior, medial/lateral - supine, EOB bilat  6 min Gr. II-III   Knee PROM - supine, EOB bilat  6 min Pushing into flexion range more now. NMR Re-education       Biodex - limits of stability   Medium skill level lvl 9 stability      CC TKE Resistance 3.5 5s 20 bilat   Mod. BOSU lunge isos   10s 8 bilat   Sport cord march (yellow) Fwd/bwd 2 10 Each position; To begin initiating SLS positions   SLS balance on airex  10s 5 bilat   Reverse lunge w/slider              Access Code: [email or web address]  URL: [email or web address]  Date: 10/06/2021  Prepared by: Bettina Munoz    Exercises    *In addition to pre-op exercises including hamstring stretch, gastroc stretch, heelslides, quad sets, SLR flexion. Therapeutic Exercise and NMR EXR  [x] (37776) Provided verbal/tactile cueing for activities related to strengthening, flexibility, endurance, ROM for improvements in LE, proximal hip, and core control with self care, mobility, lifting, ambulation. [x] (51355) Provided verbal/tactile cueing for activities related to improving balance, coordination, kinesthetic sense, posture, motor skill, proprioception  to assist with LE, proximal hip, and core control in self care, mobility, lifting, ambulation and eccentric single leg control.      NMR and Therapeutic Activities:    [x] (51675 or 63197) Provided verbal/tactile cueing for activities related to improving balance, coordination, kinesthetic sense, posture, motor skill, proprioception and motor activation to allow for proper function of core, proximal hip and LE with self care and ADLs  [x] (26438) Gait Re-education- Provided training and instruction to the patient for proper LE, core and proximal hip recruitment and positioning and eccentric body weight control with ambulation re-education including up and down stairs     Home Exercise Program:    [x] (23420) Reviewed/Progressed HEP activities related to strengthening, flexibility, endurance, ROM of core, proximal hip and LE for functional self-care, mobility, lifting and ambulation/stair navigation [x] (98868)Reviewed/Progressed HEP activities related to improving balance, coordination, kinesthetic sense, posture, motor skill, proprioception of core, proximal hip and LE for self care, mobility, lifting, and ambulation/stair navigation      Manual Treatments:  PROM / STM / Oscillations-Mobs:  G-I, II, III, IV (PA's, Inf., Post.)  [x] (47205) Provided manual therapy to mobilize LE, proximal hip and/or LS spine soft tissue/joints for the purpose of modulating pain, promoting relaxation,  increasing ROM, reducing/eliminating soft tissue swelling/inflammation/restriction, improving soft tissue extensibility and allowing for proper ROM for normal function with self care, mobility, lifting and ambulation. Modalities:  . Deferred. Will ice at home. 3/2    Charges:  Timed Code Treatment Minutes: 60   Total Treatment Minutes: 60       [] EVAL (LOW) 04410 (typically 20 minutes face-to-face)   [] EVAL (MOD) 76714 (typically 30 minutes face-to-face)  [] EVAL (HIGH) 69804 (typically 45 minutes face-to-face)  [] RE-EVAL     [x] WX(33643) x 1    [] IONTO (90214)  [x] NMR (85605) x 1   [] VASO (35344)  [x] Manual (09300) x 1    [] Other:  [x] TA (05456)x 1   [] Mech Traction (97682)  [] ES(attended) (79015)     [] ES (un) (85121):     GOALS:  Patient stated goal: Be able to do normal activities. [x] Progressing: [] Met: [] Not Met: [] Adjusted    Therapist goals for Patient:   Short Term Goals: To be achieved in: 2 weeks  1. Independent in HEP and progression per patient tolerance, in order to prevent re-injury. [] Progressing: [x] Met: [] Not Met: [] Adjusted  2. Patient will have a decrease in pain to facilitate improvement in movement, function, and ADLs as indicated by Functional Deficits. [x] Progressing: [] Met: [] Not Met: [] Adjusted     Long Term Goals: To be achieved in: 8 weeks  1. Disability index score of 20% or less for the LEFS to assist with reaching prior level of function.    [x] Progressing: [] Met: [] Not Met: [] Adjusted   2. Patient will demonstrate increased AROM to 0-120 to allow for proper joint functioning as indicated by patients Functional Deficits. [] Progressing: [x] Met: [] Not Met: [] Adjusted    3. Patient will demonstrate an increase in strength to good proximal hip strength and control, within 5lb HHD in LE to allow for proper functional mobility as indicated by patients Functional Deficits. [x] Progressing: [] Met: [] Not Met: [] Adjusted   4. Patient will demonstrate normal gait mechanics without increased symptoms or restriction to allow him to walk and perform all daily mobility. [] Progressing: [x] Met: [] Not Met: [] Adjusted    5. Patient will be able to navigate stairs up/down with reciprocal gait mechanics without increased symptoms or restriction to allow him normal household mobility without restricted access to certain parts of his home. [x] Progressing: [] Met: [] Not Met: [] Adjusted           Progression Towards Functional goals:  [x] Patient is progressing as expected towards functional goals listed. [] Progression is slowed due to complexities listed. [] Progression has been slowed due to co-morbidities. [] Plan just implemented, too soon to assess goals progression  [] Other:     ASSESSMENT:  Patient has been seen for 50 PT visits thus far (21 for the most recent L knee surgery) following both a R and a L meniscal root repair staged 12 weeks apart. Overall, patient has seen very good improvement in pain and function. Reports subjective improvement in function from 11% function to 51% function on the LEFS. Patient is now ambulating without assistive device with normal gait mechanics. R and L knee ROM has returned to pre-surgery levels at this time, however, R knee does still have some stiffness/tightness with end range flexion.  Patient still reporting some intermittent R knee pain typically with prolonged activities such as standing, walking, and going down>up stairs. These complaints are consistent with findings of greater strength limitations on the R knee compared to the L. Patient will continue to benefit from skilled PT to address remaining sxs, strength deficits, and functional limitations to enable full, safe return to PLOF. Return to Play: (if applicable)   []  Stage 1: Intro to Strength   []  Stage 2: Return to Run and Strength   []  Stage 3: Return to Jump and Strength   []  Stage 4: Dynamic Strength and Agility   []  Stage 5: Sport Specific Training     []  Ready to Return to Play, Meets All Above Stages   []  Not Ready for Return to Sports   Comments:            Treatment/Activity Tolerance:  [x] Patient tolerated treatment well [] Patient limited by fatique  [] Patient limited by pain  [] Patient limited by other medical complications  [] Other:     Overall Progression Towards Functional goals/ Treatment Progress Update:  [x] Patient is progressing as expected towards functional goals listed. [] Progression is slowed due to complexities/Impairments listed. [] Progression has been slowed due to co-morbidities. [] Plan just implemented, too soon to assess goals progression <30days   [] Goals require adjustment due to lack of progress  [] Patient is not progressing as expected and requires additional follow up with physician  [] Other    Prognosis for POC: [x] Good [] Fair  [] Poor    Patient requires continued skilled intervention: [x] Yes  [] No        PLAN:   [x] Continue per plan of care [] Alter current plan (see comments)  [] Plan of care initiated [] Hold pending MD visit [] Discharge    Electronically signed by: Abhijit Leija PT, DPT, MS, SCS    Note: If patient does not return for scheduled/recommended follow up visits, this note will serve as a discharge from care along with the most recent update on progress.

## 2022-03-07 ENCOUNTER — HOSPITAL ENCOUNTER (OUTPATIENT)
Dept: PHYSICAL THERAPY | Age: 62
Setting detail: THERAPIES SERIES
Discharge: HOME | End: 2022-03-07
Payer: COMMERCIAL

## 2022-03-07 PROCEDURE — 97110 THERAPEUTIC EXERCISES: CPT

## 2022-03-07 PROCEDURE — 97140 MANUAL THERAPY 1/> REGIONS: CPT

## 2022-03-07 PROCEDURE — 97530 THERAPEUTIC ACTIVITIES: CPT

## 2022-03-07 PROCEDURE — 97112 NEUROMUSCULAR REEDUCATION: CPT

## 2022-03-07 NOTE — FLOWSHEET NOTE
[facility], [place]    Physical Therapy Treatment Note/ Progress Report:     Date:  3/7/2022    Patient Name:  Tamanna Muñiz    :  1960  MRN: [id number]  Medical/Treatment Diagnosis Information:  · Diagnosis: M23.306 Other meniscus derangements, unspecified meniscus, R knee; M25.561 R knee pain  · Treatment Diagnosis: M23.306 Other meniscus derangements, unspecified meniscus, R knee; M25.561 R knee pain  Insurance/Certification information:  PT Insurance Information: [facility]  Physician Information:  Referring Practitioner: Silvano Schultz  Plan of care signed (Y/N):     Date of Patient follow up with Physician: 3/9/22     Progress Report: []  Yes  [x]  No     Functional Scale: 49% disability - LEFS (41/80)   Date: 3/2/22    Date Range for reporting period:  Beginnin21  Endin days or 10 visits    Progress report due (10 Rx/or 30 days whichever is less): 17    Recertification due (POC duration/ or 90 days whichever is less): 22    Visit # Insurance Allowable Auth Needed   46  (22 post-op for L knee) [facility] []Yes    [x]No     Pain level:  1-2/10 intermittent medial R knee soreness     SUBJECTIVE:  Floors are finished and all furniture and boxes are moved back to where they belong and everything is fully put away. Traveling to [place] to visit her mother-in-law . Somewhat worried about getting tired with all the walking required to get through the airport. Feels like she still fatigues pretty quickly with prolonged walking and needs to sit down to rest, especially her R knee. Stairs are progressively getting easier. Going down is still more challenging on the R. Has not attempted to get down on the floor, but does need to be able to do that eventually so she can get stuff out from under her bed.     OBJECTIVE: 3/2/22   Observation:    Test measurements:  127 degrees L knee flexion after stretching     HIP Abd      HIP Ext       HIP IR       HIP ER       Knee ext 0 0   Knee Flex 123 125   Ankle PF       Ankle DF       Ankle In       Ankle Ev       Strength  LEFT RIGHT   HIP Flexors  22.6# 26.8#   HIP Abductors 11.8# 13.6#   HIP Ext       Hip ER       Knee EXT (quad) 18.5# p! 32.3#   Knee Flex (HS) 33.5# 28.6#   Ankle DF       Ankle PF       Ankle Inv       Ankle EV               Circumference  Mid apex  7 cm prox       46.0 cm  51.0 cm     45.5 cm  51.0 cm     Gait:  Ambulating without AD; normal mechanics. RESTRICTIONS/PRECAUTIONS: High BP. Previous tobacco use (5 per day). R knee scope, meniscus repair on 9/17/21. L knee scope, meniscus repair on 12/17/21. Exercises/Interventions:     Therapeutic Exercise Resistance Sets/sec Reps Notes   Recumbent bike 3  10 min    Gastroc stretch on SB  30s 5    Hamstring stretch Long sitting 30s 5    Ankle pumps HEP   Heelslides w/strap Supine Pushing into the range more, bilat. Quad sets     SAQ over bolster    LAQ @EOB 0-90 Performed with BFR. See below. SLR flexion  Performed with BFR. See below. SLR abduction  Performed with BFR. See below. SLR adduction 2#    [email or web address] LOP (160 mmHg) Blue cuff 30-15-15-15  SLR flexion/sidelying hip abd/LAQ/standing hamstring curls    **Performed at end of session starting 2/14          Leg press -SL 55# on the L  50# on the R 2 10 bilat   Quad extension isos - Cybex  60 deg angle on L  45 deg angle on R  10s 10 Each LE individually  R does not tolerate as well due to p! Hamstring curls - Cybex - 2 up SL down 30# 2 10 bilat                   Therapeutic Activities       Step ups on 8\"/downs  bilat 2   10    ecc focus   TRX squat taps to chair Elevated x1 foam 2 10                       Manual Intervention       Patella mobs - inferior, medial/lateral - supine, EOB bilat  6 min Gr. II-III   Knee PROM - supine, EOB bilat  6 min Pushing into flexion range more now.                                NMR Re-education       Biodex - limits of stability   Medium skill level lvl 9 stability      CC TKE Resistance 3.5 5s 20 bilat   Mod. BOSU lunge isos   10s 8 bilat   Sport cord march (yellow) Side/side 2 10 Each position; To begin initiating SLS positions   SLS balance on airex  10s 5 bilat   Reverse lunge w/slider              Access Code: [email or web address]  URL: [email or web address]  Date: 10/06/2021  Prepared by: Patric De La O    Exercises    *In addition to pre-op exercises including hamstring stretch, gastroc stretch, heelslides, quad sets, SLR flexion. Therapeutic Exercise and NMR EXR  [x] (63145) Provided verbal/tactile cueing for activities related to strengthening, flexibility, endurance, ROM for improvements in LE, proximal hip, and core control with self care, mobility, lifting, ambulation. [x] (05962) Provided verbal/tactile cueing for activities related to improving balance, coordination, kinesthetic sense, posture, motor skill, proprioception  to assist with LE, proximal hip, and core control in self care, mobility, lifting, ambulation and eccentric single leg control.      NMR and Therapeutic Activities:    [x] (34734 or 94305) Provided verbal/tactile cueing for activities related to improving balance, coordination, kinesthetic sense, posture, motor skill, proprioception and motor activation to allow for proper function of core, proximal hip and LE with self care and ADLs  [x] (60232) Gait Re-education- Provided training and instruction to the patient for proper LE, core and proximal hip recruitment and positioning and eccentric body weight control with ambulation re-education including up and down stairs     Home Exercise Program:    [x] (61253) Reviewed/Progressed HEP activities related to strengthening, flexibility, endurance, ROM of core, proximal hip and LE for functional self-care, mobility, lifting and ambulation/stair navigation   [x] (57179)Reviewed/Progressed HEP activities related to improving balance, coordination, kinesthetic sense, posture, motor skill, proprioception of core, proximal hip and LE for self care, mobility, lifting, and ambulation/stair navigation      Manual Treatments:  PROM / STM / Oscillations-Mobs:  G-I, II, III, IV (PA's, Inf., Post.)  [x] (70412) Provided manual therapy to mobilize LE, proximal hip and/or LS spine soft tissue/joints for the purpose of modulating pain, promoting relaxation,  increasing ROM, reducing/eliminating soft tissue swelling/inflammation/restriction, improving soft tissue extensibility and allowing for proper ROM for normal function with self care, mobility, lifting and ambulation. Modalities:  . Deferred. Will ice at home. 3/7    Charges:  Timed Code Treatment Minutes: 60   Total Treatment Minutes: 60       [] EVAL (LOW) 88398 (typically 20 minutes face-to-face)   [] EVAL (MOD) 29015 (typically 30 minutes face-to-face)  [] EVAL (HIGH) 76060 (typically 45 minutes face-to-face)  [] RE-EVAL     [x] GY(64773) x 1    [] IONTO (72688)  [x] NMR (03648) x 1   [] VASO (94161)  [x] Manual (33811) x 1    [] Other:  [x] TA (39421)x 1   [] Mech Traction (89287)  [] ES(attended) (30578)     [] ES (un) (77567):     GOALS:  Patient stated goal: Be able to do normal activities. [x] Progressing: [] Met: [] Not Met: [] Adjusted    Therapist goals for Patient:   Short Term Goals: To be achieved in: 2 weeks  1. Independent in HEP and progression per patient tolerance, in order to prevent re-injury. [] Progressing: [x] Met: [] Not Met: [] Adjusted  2. Patient will have a decrease in pain to facilitate improvement in movement, function, and ADLs as indicated by Functional Deficits. [x] Progressing: [] Met: [] Not Met: [] Adjusted     Long Term Goals: To be achieved in: 8 weeks  1. Disability index score of 20% or less for the LEFS to assist with reaching prior level of function. [x] Progressing: [] Met: [] Not Met: [] Adjusted   2.  Patient will demonstrate increased AROM to 0-120 to allow for proper joint functioning as indicated by patients Functional Deficits. [] Progressing: [x] Met: [] Not Met: [] Adjusted    3. Patient will demonstrate an increase in strength to good proximal hip strength and control, within 5lb HHD in LE to allow for proper functional mobility as indicated by patients Functional Deficits. [x] Progressing: [] Met: [] Not Met: [] Adjusted   4. Patient will demonstrate normal gait mechanics without increased symptoms or restriction to allow him to walk and perform all daily mobility. [] Progressing: [x] Met: [] Not Met: [] Adjusted    5. Patient will be able to navigate stairs up/down with reciprocal gait mechanics without increased symptoms or restriction to allow him normal household mobility without restricted access to certain parts of his home. [x] Progressing: [] Met: [] Not Met: [] Adjusted           Progression Towards Functional goals:  [x] Patient is progressing as expected towards functional goals listed. [] Progression is slowed due to complexities listed. [] Progression has been slowed due to co-morbidities. [] Plan just implemented, too soon to assess goals progression  [] Other:     ASSESSMENT:  Continued session focus on strength and functional progression. Strength and quad control with going down normal 8\" height stair is getting better on the R, but patient still fatigues pretty quickly consistent with findings of greater strength limitations on the R>L knee with MMT. Overall endurance with functional activities and ADLs is still limited making patient's walking and standing tolerance limited due to fatigue. Will continue to progress as sandie to enable full, safe return to PLOF.     Return to Play: (if applicable)   []  Stage 1: Intro to Strength   []  Stage 2: Return to Run and Strength   []  Stage 3: Return to Jump and Strength   []  Stage 4: Dynamic Strength and Agility   []  Stage 5: Sport Specific Training     []  Ready to Return to Play, Meets All Above Stages   []  Not Ready for Return to Sports   Comments:            Treatment/Activity Tolerance:  [x] Patient tolerated treatment well [] Patient limited by fatique  [] Patient limited by pain  [] Patient limited by other medical complications  [] Other:     Overall Progression Towards Functional goals/ Treatment Progress Update:  [x] Patient is progressing as expected towards functional goals listed. [] Progression is slowed due to complexities/Impairments listed. [] Progression has been slowed due to co-morbidities. [] Plan just implemented, too soon to assess goals progression <30days   [] Goals require adjustment due to lack of progress  [] Patient is not progressing as expected and requires additional follow up with physician  [] Other    Prognosis for POC: [x] Good [] Fair  [] Poor    Patient requires continued skilled intervention: [x] Yes  [] No        PLAN:   [x] Continue per plan of care [] Alter current plan (see comments)  [] Plan of care initiated [] Hold pending MD visit [] Discharge    Electronically signed by: Pauline Bedolla, PT, DPT, MS, SCS    Note: If patient does not return for scheduled/recommended follow up visits, this note will serve as a discharge from care along with the most recent update on progress.

## 2022-03-09 ENCOUNTER — OFFICE VISIT (OUTPATIENT)
Dept: ORTHOPEDIC SURGERY | Age: 62
End: 2022-03-09

## 2022-03-09 ENCOUNTER — HOSPITAL ENCOUNTER (OUTPATIENT)
Dept: PHYSICAL THERAPY | Age: 62
Setting detail: THERAPIES SERIES
Discharge: HOME | End: 2022-03-09
Payer: COMMERCIAL

## 2022-03-09 VITALS — BODY MASS INDEX: 36.29 KG/M2 | WEIGHT: 245 LBS | HEIGHT: 69 IN

## 2022-03-09 DIAGNOSIS — G89.18 POST-OP PAIN: Primary | ICD-10-CM

## 2022-03-09 PROCEDURE — 99024 POSTOP FOLLOW-UP VISIT: CPT | Performed by: ORTHOPAEDIC SURGERY

## 2022-03-09 PROCEDURE — 97112 NEUROMUSCULAR REEDUCATION: CPT

## 2022-03-09 PROCEDURE — 97110 THERAPEUTIC EXERCISES: CPT

## 2022-03-09 PROCEDURE — 97140 MANUAL THERAPY 1/> REGIONS: CPT

## 2022-03-09 NOTE — FLOWSHEET NOTE
Circumference  Mid apex  7 cm prox       46.0 cm  51.0 cm     45.5 cm  51.0 cm     Gait:  Ambulating without AD; normal mechanics. RESTRICTIONS/PRECAUTIONS: High BP. Previous tobacco use (5 per day). R knee scope, meniscus repair on 9/17/21. L knee scope, meniscus repair on 12/17/21. Exercises/Interventions:     Therapeutic Exercise Resistance Sets/sec Reps Notes   Recumbent bike 3  10 min    Gastroc stretch on SB  30s 5    Hamstring stretch Long sitting 30s 5    Ankle pumps HEP   Heelslides w/strap Supine Pushing into the range more, bilat. Quad sets     SAQ over bolster    LAQ @EOB 0-90 Performed with BFR. See below. SLR flexion  Performed with BFR. See below. SLR abduction  Performed with BFR. See below. SLR adduction 2#    [email or web address] LOP (160 mmHg) Blue cuff 30-15-15-15  SLR flexion/sidelying hip abd/LAQ/standing hamstring curls    **Performed at end of session starting 2/14          Leg press -SL 55# on the L  50# on the R 2 10 bilat   Quad extension isos - Cybex  60 deg angle on L  45 deg angle on R  10s 10 Each LE individually  R does not tolerate as well due to p! Hamstring curls - Cybex - 2 up SL down 30# 2 10 bilat                   Therapeutic Activities       Step ups on 8\"/downs  bilat 2   10    ecc focus   TRX squat taps to chair Elevated x1 foam 2 10                       Manual Intervention       Patella mobs - inferior, medial/lateral - supine, EOB bilat  6 min Gr. II-III   Knee PROM - supine, EOB bilat  6 min Pushing into flexion range more now. NMR Re-education       Biodex - limits of stability   Medium skill level lvl 9 stability      CC TKE Resistance 3.5 5s 20 bilat   Mod. BOSU lunge isos   10s 8 bilat   Sport cord march (yellow) Side/side 2 10 Each position; To begin initiating SLS positions   SLS balance on airex  10s 5 bilat   Reverse lunge w/slider              Access Code: [email or web address]  URL: [email or web address]  Date: 10/06/2021  Prepared by: Bhanu Wong    Exercises    *In addition to pre-op exercises including hamstring stretch, gastroc stretch, heelslides, quad sets, SLR flexion. Therapeutic Exercise and NMR EXR  [x] (82180) Provided verbal/tactile cueing for activities related to strengthening, flexibility, endurance, ROM for improvements in LE, proximal hip, and core control with self care, mobility, lifting, ambulation. [x] (90927) Provided verbal/tactile cueing for activities related to improving balance, coordination, kinesthetic sense, posture, motor skill, proprioception  to assist with LE, proximal hip, and core control in self care, mobility, lifting, ambulation and eccentric single leg control.      NMR and Therapeutic Activities:    [x] (64906 or 42461) Provided verbal/tactile cueing for activities related to improving balance, coordination, kinesthetic sense, posture, motor skill, proprioception and motor activation to allow for proper function of core, proximal hip and LE with self care and ADLs  [x] (46800) Gait Re-education- Provided training and instruction to the patient for proper LE, core and proximal hip recruitment and positioning and eccentric body weight control with ambulation re-education including up and down stairs     Home Exercise Program:    [x] (71731) Reviewed/Progressed HEP activities related to strengthening, flexibility, endurance, ROM of core, proximal hip and LE for functional self-care, mobility, lifting and ambulation/stair navigation   [x] (07486)Reviewed/Progressed HEP activities related to improving balance, coordination, kinesthetic sense, posture, motor skill, proprioception of core, proximal hip and LE for self care, mobility, lifting, and ambulation/stair navigation      Manual Treatments:  PROM / STM / Oscillations-Mobs:  G-I, II, III, IV (PA's, Inf., Post.)  [x] (59922) Provided manual therapy to mobilize LE, proximal hip and/or LS spine soft tissue/joints for the purpose of modulating pain, promoting relaxation,  increasing ROM, reducing/eliminating soft tissue swelling/inflammation/restriction, improving soft tissue extensibility and allowing for proper ROM for normal function with self care, mobility, lifting and ambulation. Modalities:  . Deferred. Will ice at home. 3/7    Charges:  Timed Code Treatment Minutes: 60   Total Treatment Minutes: 60       [] EVAL (LOW) 53072 (typically 20 minutes face-to-face)   [] EVAL (MOD) 06427 (typically 30 minutes face-to-face)  [] EVAL (HIGH) 88497 (typically 45 minutes face-to-face)  [] RE-EVAL     [x] NN(34324) x 1    [] IONTO (05303)  [x] NMR (78992) x 1   [] VASO (32957)  [x] Manual (91053) x 1    [] Other:  [x] TA (72312)x 1   [] Mech Traction (01573)  [] ES(attended) (57308)     [] ES (un) (42574):     GOALS:  Patient stated goal: Be able to do normal activities. [x] Progressing: [] Met: [] Not Met: [] Adjusted    Therapist goals for Patient:   Short Term Goals: To be achieved in: 2 weeks  1. Independent in HEP and progression per patient tolerance, in order to prevent re-injury. [] Progressing: [x] Met: [] Not Met: [] Adjusted  2. Patient will have a decrease in pain to facilitate improvement in movement, function, and ADLs as indicated by Functional Deficits. [x] Progressing: [] Met: [] Not Met: [] Adjusted     Long Term Goals: To be achieved in: 8 weeks  1. Disability index score of 20% or less for the LEFS to assist with reaching prior level of function. [x] Progressing: [] Met: [] Not Met: [] Adjusted   2. Patient will demonstrate increased AROM to 0-120 to allow for proper joint functioning as indicated by patients Functional Deficits. [] Progressing: [x] Met: [] Not Met: [] Adjusted    3. Patient will demonstrate an increase in strength to good proximal hip strength and control, within 5lb HHD in LE to allow for proper functional mobility as indicated by patients Functional Deficits. [x] Progressing: [] Met: [] Not Met: [] Adjusted   4. Patient will demonstrate normal gait mechanics without increased symptoms or restriction to allow him to walk and perform all daily mobility. [] Progressing: [x] Met: [] Not Met: [] Adjusted    5. Patient will be able to navigate stairs up/down with reciprocal gait mechanics without increased symptoms or restriction to allow him normal household mobility without restricted access to certain parts of his home. [x] Progressing: [] Met: [] Not Met: [] Adjusted           Progression Towards Functional goals:  [x] Patient is progressing as expected towards functional goals listed. [] Progression is slowed due to complexities listed. [] Progression has been slowed due to co-morbidities. [] Plan just implemented, too soon to assess goals progression  [] Other:     ASSESSMENT:  Good tolerance to treatment. Appropriately fatigued at conclusion. Soreness with cybex knee extension on RLE but otherwise pain free. Return to Play: (if applicable)   []  Stage 1: Intro to Strength   []  Stage 2: Return to Run and Strength   []  Stage 3: Return to Jump and Strength   []  Stage 4: Dynamic Strength and Agility   []  Stage 5: Sport Specific Training     []  Ready to Return to Play, Meets All Above Stages   []  Not Ready for Return to Sports   Comments:            Treatment/Activity Tolerance:  [x] Patient tolerated treatment well [] Patient limited by fatique  [] Patient limited by pain  [] Patient limited by other medical complications  [] Other:     Overall Progression Towards Functional goals/ Treatment Progress Update:  [x] Patient is progressing as expected towards functional goals listed. [] Progression is slowed due to complexities/Impairments listed. [] Progression has been slowed due to co-morbidities.   [] Plan just implemented, too soon to assess goals progression <30days   [] Goals require adjustment due to lack of progress  [] Patient is not progressing as expected and requires additional follow up with physician  [] Other    Prognosis for POC: [x] Good [] Fair  [] Poor    Patient requires continued skilled intervention: [x] Yes  [] No        PLAN:   [x] Continue per plan of care [] Alter current plan (see comments)  [] Plan of care initiated [] Hold pending MD visit [] Discharge    Electronically signed by: Mallorie Nair PTA    Note: If patient does not return for scheduled/recommended follow up visits, this note will serve as a discharge from care along with the most recent update on progress.

## 2022-03-09 NOTE — PROGRESS NOTES
3/9/2022     Interval History:  Status post right posterior medial meniscus root repair on 9/17/2021  Status post left posterior medial meniscus root repair on 12/17/21    The patient returns for postop evaluation. Overall she is doing well and bearing full weight without assistive devices. She is getting physical therapy. She does report some mild discomfort and achiness within the right knee but says is improving. Objective:  Ht 5' 9\" (1.753 m)   Wt 245 lb (111.1 kg)   BMI 36.18 kg/m²      Physical Exam:  Examination of the left knee   There is a + effusion, no gross deformity or skin changes  Range of motion reveals 0 to 130  neg lachman, negative posterior drawer, no pain or laxity with varus or valgus stress at 0 degrees and 30 degrees of flexion  + medial joint line tenderness  5 out of 5 strength throughout distal muscle groups  Sensation is intact to light touch throughout all distributions  There is no calf swelling or tenderness  Palpable DP pulse, brisk cap refill, 2+ symmetric reflexes    Imaging:  AP and lateral x-ray of the left knee obtained in the office today on 12/29/2021 were reviewed. Postoperative changes consistent with meniscus root repair with cortical button along the lateral proximal tibia. There is no fracture or dislocation. No other abnormality. Assessment:  Status post right posterior medial meniscus root repair on 9/17/2021  Status post left posterior medial meniscus root repair on 12/17/21    Plan:  Overall the patient is doing well. Continue physical therapy. Return in 6 weeks. She is continues to have any discomfort within the right knee at that time we could consider cortisone injection. Disclaimer: This note was dictated with voice recognition software. Though review and correction are routine, we apologize for any errors.

## 2022-03-14 ENCOUNTER — APPOINTMENT (OUTPATIENT)
Dept: PHYSICAL THERAPY | Age: 62
End: 2022-03-14
Payer: COMMERCIAL

## 2022-03-16 ENCOUNTER — HOSPITAL ENCOUNTER (OUTPATIENT)
Dept: PHYSICAL THERAPY | Age: 62
Setting detail: THERAPIES SERIES
Discharge: HOME | End: 2022-03-16
Payer: COMMERCIAL

## 2022-03-16 PROCEDURE — 97530 THERAPEUTIC ACTIVITIES: CPT

## 2022-03-16 PROCEDURE — 97112 NEUROMUSCULAR REEDUCATION: CPT

## 2022-03-16 PROCEDURE — 97110 THERAPEUTIC EXERCISES: CPT

## 2022-03-16 NOTE — FLOWSHEET NOTE
[facility], [place]    Physical Therapy Treatment Note/ Progress Report:     Date:  3/16/2022    Patient Name:  Chelsea Reeves    :  1960  MRN: [id number]  Medical/Treatment Diagnosis Information:  · Diagnosis: M23.306 Other meniscus derangements, unspecified meniscus, R knee; M25.561 R knee pain  · Treatment Diagnosis: M23.306 Other meniscus derangements, unspecified meniscus, R knee; M25.561 R knee pain  Insurance/Certification information:  PT Insurance Information: [facility]  Physician Information:  Referring Practitioner: Javy Rizvi  Plan of care signed (Y/N):     Date of Patient follow up with Physician: 3/9/22     Progress Report: []  Yes  [x]  No     Functional Scale: 49% disability - LEFS (41/80)   Date: 3/2/22    Date Range for reporting period:  Beginnin21  Endin days or 10 visits    Progress report due (10 Rx/or 30 days whichever is less):     Recertification due (POC duration/ or 90 days whichever is less): 22    Visit # Insurance Allowable Auth Needed   48  (23 post-op for L knee) [facility] []Yes    [x]No     Pain level:  1-2/10 intermittent medial R knee soreness     SUBJECTIVE:  Flew to [place] with her  last week and spent the week visiting her mother-in-law. Surprised at how well she did with all the standing and walking required to navigate the airport. Did a ton of walking on the trip. Did have to stop and rest intermittently due to fatigue. R knee typically started to get sore when she would start to fatigue with prolonged walking.      OBJECTIVE: 3/2/22   Observation:    Test measurements:  127 degrees L knee flexion after stretching     HIP Abd      HIP Ext       HIP IR       HIP ER       Knee ext 0 0   Knee Flex 123 125   Ankle PF       Ankle DF       Ankle In       Ankle Ev       Strength  LEFT RIGHT   HIP Flexors  22.6# 26.8#   HIP Abductors 11.8# 13.6#   HIP Ext       Hip ER       Knee EXT (quad) 18.5# p! 32.3#   Knee Flex (HS) 33.5# 28.6#   Ankle DF       Ankle PF       Ankle Inv       Ankle EV               Circumference  Mid apex  7 cm prox       46.0 cm  51.0 cm     45.5 cm  51.0 cm     Gait:  Ambulating without AD; normal mechanics. RESTRICTIONS/PRECAUTIONS: High BP. Previous tobacco use (5 per day). R knee scope, meniscus repair on 9/17/21. L knee scope, meniscus repair on 12/17/21. Exercises/Interventions:     Therapeutic Exercise Resistance Sets/sec Reps Notes   Recumbent bike 3  10 min    Gastroc stretch on SB  30s 5    Hamstring stretch Long sitting 30s 5    Ankle pumps HEP   Heelslides w/strap Supine Pushing into the range more, bilat. Quad sets     SAQ over bolster    LAQ @EOB 0-90 Performed with BFR. See below. SLR flexion  Performed with BFR. See below. SLR abduction  Performed with BFR. See below. SLR adduction 2#    [email or web address] LOP (160 mmHg) Blue cuff 30-15-15-15  SLR flexion/sidelying hip abd/LAQ/standing hamstring curls    **Performed at end of session starting 2/14          Leg press -SL 60#  2 10 bilat   Quad extension - Cybex  45 deg angle on R isos  60 deg angle on L - active motion HEAVY   10s     10  10   Each LE individually  R does not tolerate as well due to p! when coming out of that position   Hamstring curls - Cybex -   SL on L  2 up SL down on R 15# 2 10 bilat                   Therapeutic Activities       Step ups on 8\"/downs  bilat 2   10    ecc focus   Squat taps to chair Elevated x1 foam 1 10 No UE support                      Manual Intervention       Patella mobs - inferior, medial/lateral - supine, EOB bilat  3 min Gr. II-III   Knee PROM - supine, EOB bilat  4 min Pushing into flexion range more now. NMR Re-education       Biodex - limits of stability   Medium skill level lvl 9 stability      CC TKE Resistance 3.5 5s 20 bilat   Mod. BOSU lunge isos   10s 8 bilat   Sport cord march (yellow) Side/side 2 10 Each position;  To begin initiating SLS positions   SLS balance on airex  20s 3 bilat   Reverse lunge w/slider              Access Code: [email or web address]  URL: [email or web address]  Date: 10/06/2021  Prepared by: Pierce House    Exercises    *In addition to pre-op exercises including hamstring stretch, gastroc stretch, heelslides, quad sets, SLR flexion. Therapeutic Exercise and NMR EXR  [x] (36847) Provided verbal/tactile cueing for activities related to strengthening, flexibility, endurance, ROM for improvements in LE, proximal hip, and core control with self care, mobility, lifting, ambulation. [x] (48324) Provided verbal/tactile cueing for activities related to improving balance, coordination, kinesthetic sense, posture, motor skill, proprioception  to assist with LE, proximal hip, and core control in self care, mobility, lifting, ambulation and eccentric single leg control.      NMR and Therapeutic Activities:    [x] (67866 or 67465) Provided verbal/tactile cueing for activities related to improving balance, coordination, kinesthetic sense, posture, motor skill, proprioception and motor activation to allow for proper function of core, proximal hip and LE with self care and ADLs  [x] (23278) Gait Re-education- Provided training and instruction to the patient for proper LE, core and proximal hip recruitment and positioning and eccentric body weight control with ambulation re-education including up and down stairs     Home Exercise Program:    [x] (42040) Reviewed/Progressed HEP activities related to strengthening, flexibility, endurance, ROM of core, proximal hip and LE for functional self-care, mobility, lifting and ambulation/stair navigation   [x] (41705)Reviewed/Progressed HEP activities related to improving balance, coordination, kinesthetic sense, posture, motor skill, proprioception of core, proximal hip and LE for self care, mobility, lifting, and ambulation/stair navigation      Manual Treatments:  PROM / STM / Oscillations-Mobs:  G-I, II, III, IV (PA's, Inf., Post.)  [x] (16435) Provided manual therapy to mobilize LE, proximal hip and/or LS spine soft tissue/joints for the purpose of modulating pain, promoting relaxation,  increasing ROM, reducing/eliminating soft tissue swelling/inflammation/restriction, improving soft tissue extensibility and allowing for proper ROM for normal function with self care, mobility, lifting and ambulation. Modalities:  . Deferred. Will ice at home. 3/16    Charges:  Timed Code Treatment Minutes: 50   Total Treatment Minutes: 50       [] EVAL (LOW) 29491 (typically 20 minutes face-to-face)   [] EVAL (MOD) 69281 (typically 30 minutes face-to-face)  [] EVAL (HIGH) 31387 (typically 45 minutes face-to-face)  [] RE-EVAL     [x] DENILSON(59506) x 1    [] IONTO (76542)  [x] NMR (07034) x 1   [] VASO (65634)  [] Manual (13771) x     [] Other:  [x] TA (97941)x 1   [] Mech Traction (74497)  [] ES(attended) (64084)     [] ES (un) (87201):     GOALS:  Patient stated goal: Be able to do normal activities. [x] Progressing: [] Met: [] Not Met: [] Adjusted    Therapist goals for Patient:   Short Term Goals: To be achieved in: 2 weeks  1. Independent in HEP and progression per patient tolerance, in order to prevent re-injury. [] Progressing: [x] Met: [] Not Met: [] Adjusted  2. Patient will have a decrease in pain to facilitate improvement in movement, function, and ADLs as indicated by Functional Deficits. [x] Progressing: [] Met: [] Not Met: [] Adjusted     Long Term Goals: To be achieved in: 8 weeks  1. Disability index score of 20% or less for the LEFS to assist with reaching prior level of function. [x] Progressing: [] Met: [] Not Met: [] Adjusted   2. Patient will demonstrate increased AROM to 0-120 to allow for proper joint functioning as indicated by patients Functional Deficits. [] Progressing: [x] Met: [] Not Met: [] Adjusted    3.  Patient will demonstrate an increase in strength to good proximal hip strength and control, within 5lb HHD in LE to allow for proper functional mobility as indicated by patients Functional Deficits. [x] Progressing: [] Met: [] Not Met: [] Adjusted   4. Patient will demonstrate normal gait mechanics without increased symptoms or restriction to allow him to walk and perform all daily mobility. [] Progressing: [x] Met: [] Not Met: [] Adjusted    5. Patient will be able to navigate stairs up/down with reciprocal gait mechanics without increased symptoms or restriction to allow him normal household mobility without restricted access to certain parts of his home. [x] Progressing: [] Met: [] Not Met: [] Adjusted           Progression Towards Functional goals:  [x] Patient is progressing as expected towards functional goals listed. [] Progression is slowed due to complexities listed. [] Progression has been slowed due to co-morbidities. [] Plan just implemented, too soon to assess goals progression  [] Other:     ASSESSMENT:  Went to [place] last week to visit her mother-in-law. Actually did very well with prolonged standing and walking to navigate airport. Does have to take intermittent breaks due to fatigue. Typically starts to have R medial knee discomfort at the point where she starts to fatigue. Patient plans on [place] back [place] the rec center to do more strength activities on her own. Also plans on getting in the pool for strengthening and endurance as well. Patient to have at least another 6 weeks of PT until follow up with Dr. Pauly Clark in April at which time patient will get cortisone injection in R knee if needed. Continued session focus on strength and functional progression. Tolerated well, but fatigued at end of session.     Return to Play: (if applicable)   []  Stage 1: Intro to Strength   []  Stage 2: Return to Run and Strength   []  Stage 3: Return to Jump and Strength   []  Stage 4: Dynamic Strength and Agility   []  Stage 5: Sport Specific Training     []  Ready to Return to Play, Meets All Above Stages   []  Not Ready for Return to Sports   Comments:            Treatment/Activity Tolerance:  [x] Patient tolerated treatment well [] Patient limited by fatique  [] Patient limited by pain  [] Patient limited by other medical complications  [] Other:     Overall Progression Towards Functional goals/ Treatment Progress Update:  [x] Patient is progressing as expected towards functional goals listed. [] Progression is slowed due to complexities/Impairments listed. [] Progression has been slowed due to co-morbidities. [] Plan just implemented, too soon to assess goals progression <30days   [] Goals require adjustment due to lack of progress  [] Patient is not progressing as expected and requires additional follow up with physician  [] Other    Prognosis for POC: [x] Good [] Fair  [] Poor    Patient requires continued skilled intervention: [x] Yes  [] No        PLAN:   [x] Continue per plan of care [] Alter current plan (see comments)  [] Plan of care initiated [] Hold pending MD visit [] Discharge    Electronically signed by: Lilli Bobo, PT, DPT, MS, SCS    Note: If patient does not return for scheduled/recommended follow up visits, this note will serve as a discharge from care along with the most recent update on progress.

## 2022-03-18 ENCOUNTER — HOSPITAL ENCOUNTER (OUTPATIENT)
Dept: PHYSICAL THERAPY | Age: 62
Setting detail: THERAPIES SERIES
Discharge: HOME | End: 2022-03-18
Payer: COMMERCIAL

## 2022-03-18 PROCEDURE — 97110 THERAPEUTIC EXERCISES: CPT

## 2022-03-18 PROCEDURE — 97112 NEUROMUSCULAR REEDUCATION: CPT

## 2022-03-18 PROCEDURE — 97530 THERAPEUTIC ACTIVITIES: CPT

## 2022-03-18 NOTE — FLOWSHEET NOTE
Ambulating without AD; normal mechanics. RESTRICTIONS/PRECAUTIONS: High BP. Previous tobacco use (5 per day). R knee scope, meniscus repair on 9/17/21. L knee scope, meniscus repair on 12/17/21. Exercises/Interventions:     Therapeutic Exercise Resistance Sets/sec Reps Notes   Recumbent bike 3  10 min    Gastroc stretch on SB  30s 5    Hamstring stretch Long sitting 30s 5    Ankle pumps HEP   Heelslides w/strap Supine Pushing into the range more, bilat. Quad sets     SAQ over bolster    LAQ @EOB 0-90 Performed with BFR. See below. SLR flexion  Performed with BFR. See below. SLR abduction  Performed with BFR. See below. SLR adduction 2#    [email or web address] LOP (160 mmHg) Blue cuff 30-15-15-15  SLR flexion/sidelying hip abd/LAQ/standing hamstring curls    **Performed at end of session starting 2/14          Leg press -SL 60#  2 10 bilat   Quad extension - Cybex  45 deg angle on R   60 deg angle on L - active motion   5#  10#   1  1     10  15   Each LE individually     Hamstring curls - Cybex - SL 25# 1 10 bilat                   Therapeutic Activities       Step ups on 8\"/downs  bilat 2   10    ecc focus   Squat taps to chair Elevated x1 foam 1 10 No UE support                      Manual Intervention       Patella mobs - inferior, medial/lateral - supine, EOB bilat  3 min Gr. II-III   Knee PROM - supine, EOB bilat  4 min Pushing into flexion range more now. NMR Re-education       Biodex - limits of stability   Medium skill level lvl 9 stability      CC TKE Resistance 3.5 5s 20 bilat   Mod. BOSU lunge isos   10s 8 bilat   Sport cord march (yellow) Side/side 2 10 Each position; To begin initiating SLS positions   SLS balance on airex  20s 3 bilat   Reverse lunge w/slider              Access Code: [email or web address]  URL: [email or web address]  Date: 10/06/2021  Prepared by: Patric De La O    Exercises    *In addition to pre-op exercises including hamstring stretch, gastroc stretch, heelslides, quad sets, SLR flexion. Therapeutic Exercise and NMR EXR  [x] (54588) Provided verbal/tactile cueing for activities related to strengthening, flexibility, endurance, ROM for improvements in LE, proximal hip, and core control with self care, mobility, lifting, ambulation. [x] (58954) Provided verbal/tactile cueing for activities related to improving balance, coordination, kinesthetic sense, posture, motor skill, proprioception  to assist with LE, proximal hip, and core control in self care, mobility, lifting, ambulation and eccentric single leg control.      NMR and Therapeutic Activities:    [x] (01209 or 32528) Provided verbal/tactile cueing for activities related to improving balance, coordination, kinesthetic sense, posture, motor skill, proprioception and motor activation to allow for proper function of core, proximal hip and LE with self care and ADLs  [x] (44726) Gait Re-education- Provided training and instruction to the patient for proper LE, core and proximal hip recruitment and positioning and eccentric body weight control with ambulation re-education including up and down stairs     Home Exercise Program:    [x] (33756) Reviewed/Progressed HEP activities related to strengthening, flexibility, endurance, ROM of core, proximal hip and LE for functional self-care, mobility, lifting and ambulation/stair navigation   [x] (18727)Reviewed/Progressed HEP activities related to improving balance, coordination, kinesthetic sense, posture, motor skill, proprioception of core, proximal hip and LE for self care, mobility, lifting, and ambulation/stair navigation      Manual Treatments:  PROM / STM / Oscillations-Mobs:  G-I, II, III, IV (PA's, Inf., Post.)  [x] (43915) Provided manual therapy to mobilize LE, proximal hip and/or LS spine soft tissue/joints for the purpose of modulating pain, promoting relaxation,  increasing ROM, reducing/eliminating soft tissue swelling/inflammation/restriction, improving soft tissue extensibility and allowing for proper ROM for normal function with self care, mobility, lifting and ambulation. Modalities:  . Deferred. Will ice at home. 3/18    Charges:  Timed Code Treatment Minutes: 50   Total Treatment Minutes: 50       [] EVAL (LOW) 53146 (typically 20 minutes face-to-face)   [] EVAL (MOD) 54404 (typically 30 minutes face-to-face)  [] EVAL (HIGH) 64898 (typically 45 minutes face-to-face)  [] RE-EVAL     [x] OG(71201) x 1    [] IONTO (48154)  [x] NMR (41536) x 1   [] VASO (39815)  [] Manual (04816) x     [] Other:  [x] TA (03580)x 1   [] Mech Traction (67966)  [] ES(attended) (49335)     [] ES (un) (36381):     GOALS:  Patient stated goal: Be able to do normal activities. [x] Progressing: [] Met: [] Not Met: [] Adjusted    Therapist goals for Patient:   Short Term Goals: To be achieved in: 2 weeks  1. Independent in HEP and progression per patient tolerance, in order to prevent re-injury. [] Progressing: [x] Met: [] Not Met: [] Adjusted  2. Patient will have a decrease in pain to facilitate improvement in movement, function, and ADLs as indicated by Functional Deficits. [x] Progressing: [] Met: [] Not Met: [] Adjusted     Long Term Goals: To be achieved in: 8 weeks  1. Disability index score of 20% or less for the LEFS to assist with reaching prior level of function. [x] Progressing: [] Met: [] Not Met: [] Adjusted   2. Patient will demonstrate increased AROM to 0-120 to allow for proper joint functioning as indicated by patients Functional Deficits. [] Progressing: [x] Met: [] Not Met: [] Adjusted    3. Patient will demonstrate an increase in strength to good proximal hip strength and control, within 5lb HHD in LE to allow for proper functional mobility as indicated by patients Functional Deficits. [x] Progressing: [] Met: [] Not Met: [] Adjusted   4.  Patient will demonstrate normal gait mechanics without increased symptoms or restriction to allow him to walk and perform all daily mobility. [] Progressing: [x] Met: [] Not Met: [] Adjusted    5. Patient will be able to navigate stairs up/down with reciprocal gait mechanics without increased symptoms or restriction to allow him normal household mobility without restricted access to certain parts of his home. [x] Progressing: [] Met: [] Not Met: [] Adjusted           Progression Towards Functional goals:  [x] Patient is progressing as expected towards functional goals listed. [] Progression is slowed due to complexities listed. [] Progression has been slowed due to co-morbidities. [] Plan just implemented, too soon to assess goals progression  [] Other:     ASSESSMENT:  Patient to have at least another 6 weeks of PT until follow up with Dr. Pauly Clark in April at which time patient will get cortisone injection in R knee if needed. Continued session focus on strength and functional progression. Return to Play: (if applicable)   []  Stage 1: Intro to Strength   []  Stage 2: Return to Run and Strength   []  Stage 3: Return to Jump and Strength   []  Stage 4: Dynamic Strength and Agility   []  Stage 5: Sport Specific Training     []  Ready to Return to Play, Meets All Above Stages   []  Not Ready for Return to Sports   Comments:            Treatment/Activity Tolerance:  [x] Patient tolerated treatment well [] Patient limited by fatique  [] Patient limited by pain  [] Patient limited by other medical complications  [] Other:     Overall Progression Towards Functional goals/ Treatment Progress Update:  [x] Patient is progressing as expected towards functional goals listed. [] Progression is slowed due to complexities/Impairments listed. [] Progression has been slowed due to co-morbidities.   [] Plan just implemented, too soon to assess goals progression <30days   [] Goals require adjustment due to lack of progress  [] Patient is not progressing as expected and requires additional follow up with physician  [] Other    Prognosis for POC: [x] Good [] Fair  [] Poor    Patient requires continued skilled intervention: [x] Yes  [] No        PLAN:   [x] Continue per plan of care [] Alter current plan (see comments)  [] Plan of care initiated [] Hold pending MD visit [] Discharge    Electronically signed by: Isak Drew PT, DPT, MS, SCS    Note: If patient does not return for scheduled/recommended follow up visits, this note will serve as a discharge from care along with the most recent update on progress.

## 2022-03-21 ENCOUNTER — APPOINTMENT (OUTPATIENT)
Dept: PHYSICAL THERAPY | Age: 62
End: 2022-03-21
Payer: COMMERCIAL

## 2022-03-23 ENCOUNTER — HOSPITAL ENCOUNTER (OUTPATIENT)
Dept: PHYSICAL THERAPY | Age: 62
Setting detail: THERAPIES SERIES
Discharge: HOME | End: 2022-03-23
Payer: COMMERCIAL

## 2022-03-23 PROCEDURE — 97110 THERAPEUTIC EXERCISES: CPT

## 2022-03-23 PROCEDURE — 97032 APPL MODALITY 1+ESTIM EA 15: CPT

## 2022-03-23 PROCEDURE — 97112 NEUROMUSCULAR REEDUCATION: CPT

## 2022-03-23 PROCEDURE — 97530 THERAPEUTIC ACTIVITIES: CPT

## 2022-03-23 PROCEDURE — 20560 NDL INSJ W/O NJX 1 OR 2 MUSC: CPT

## 2022-03-23 NOTE — FLOWSHEET NOTE
[facility], [place]    Physical Therapy Treatment Note/ Progress Report:     Date:  3/23/2022    Patient Name:  Christopher Infante    :  1960  MRN: [id number]  Medical/Treatment Diagnosis Information:  · Diagnosis: M23.306 Other meniscus derangements, unspecified meniscus, R knee; M25.561 R knee pain  · Treatment Diagnosis: M23.306 Other meniscus derangements, unspecified meniscus, R knee; M25.561 R knee pain  Insurance/Certification information:  PT Insurance Information: [facility]  Physician Information:  Referring Practitioner: Elise Skelton  Plan of care signed (Y/N):     Date of Patient follow up with Physician: 3/9/22     Progress Report: []  Yes  [x]  No     Functional Scale: 49% disability - LEFS (41/80)   Date: 3/2/22    Date Range for reporting period:  Beginnin21  Endin days or 10 visits    Progress report due (10 Rx/or 30 days whichever is less): 48    Recertification due (POC duration/ or 90 days whichever is less): 22    Visit # Insurance Allowable Auth Needed   55  (25 post-op for L knee) [facility] []Yes    [x]No     Pain level:  1-2/10 intermittent medial R knee soreness     SUBJECTIVE:  Reports she has been having pain on the inside, back of the R knee with walking since this past weekend. Does not remember anything specific that brought pain on.     OBJECTIVE: 3/2/22   Observation:    Test measurements:  127 degrees L knee flexion after stretching     HIP Abd      HIP Ext       HIP IR       HIP ER       Knee ext 0 0   Knee Flex 123 125   Ankle PF       Ankle DF       Ankle In       Ankle Ev       Strength  LEFT RIGHT   HIP Flexors  22.6# 26.8#   HIP Abductors 11.8# 13.6#   HIP Ext       Hip ER       Knee EXT (quad) 18.5# p! 32.3#   Knee Flex (HS) 33.5# 28.6#   Ankle DF       Ankle PF       Ankle Inv       Ankle EV               Circumference  Mid apex  7 cm prox       46.0 cm  51.0 cm     45.5 cm  51.0 cm     Gait:  Ambulating without AD; normal mechanics. RESTRICTIONS/PRECAUTIONS: High BP. Previous tobacco use (5 per day). R knee scope, meniscus repair on 9/17/21. L knee scope, meniscus repair on 12/17/21. Exercises/Interventions:     Therapeutic Exercise Resistance Sets/sec Reps Notes   Recumbent bike 3  10 min    Soleus stretch on SB  30s 5    Hamstring stretch Long sitting 30s 5    Ankle pumps HEP   Heelslides w/strap Supine Pushing into the range more, bilat. Quad sets     SAQ over bolster    LAQ @EOB 0-90 Performed with BFR. See below. SLR flexion  Performed with BFR. See below. SLR abduction  Performed with BFR. See below. SLR adduction 2#    [email or web address] LOP (160 mmHg) Blue cuff 30-15-15-15  SLR flexion/sidelying hip abd/LAQ/standing hamstring curls    **Performed at end of session starting 2/14          Leg press -SL 60#  2 10 bilat   Quad extension - Cybex  60 deg angle - active motion   10# on R  15# on L   1  1     10  15   Each LE individually     Hamstring curls - Cybex - SL 25# 1 10 bilat                   Therapeutic Activities       Step ups on 8\"/downs  bilat 2   10    ecc focus   Squat taps to chair Elevated x1 foam 1 10 No UE support        Tests and measures to assess cause of new pain onset in R LE.   10 min Gait assessment, palpation. See below. Manual Intervention       Patella mobs - inferior, medial/lateral - supine, EOB bilat  3 min Gr. II-III   Knee PROM - supine, EOB bilat  4 min Pushing into flexion range more now. NMR Re-education       Biodex - limits of stability   Medium skill level lvl 9 stability      CC TKE Resistance 3.5 5s 20 bilat   Mod. BOSU lunge isos   10s 8 bilat   Sport cord march (yellow) Side/side 2 10 Each position; To begin initiating SLS positions   SLS balance w/SC resistance at knee yellow 20s 5 bilat   Reverse lunge w/slider              Access Code: [email or web address]  URL: [email or web address]  Date: 10/06/2021  Prepared by: [employer] activities related to improving balance, coordination, kinesthetic sense, posture, motor skill, proprioception and motor activation to allow for proper function of core, proximal hip and LE with self care and ADLs  [x] (09426) Gait Re-education- Provided training and instruction to the patient for proper LE, core and proximal hip recruitment and positioning and eccentric body weight control with ambulation re-education including up and down stairs     Home Exercise Program:    [x] (17563) Reviewed/Progressed HEP activities related to strengthening, flexibility, endurance, ROM of core, proximal hip and LE for functional self-care, mobility, lifting and ambulation/stair navigation   [x] (05317)Reviewed/Progressed HEP activities related to improving balance, coordination, kinesthetic sense, posture, motor skill, proprioception of core, proximal hip and LE for self care, mobility, lifting, and ambulation/stair navigation      Manual Treatments:  PROM / STM / Oscillations-Mobs:  G-I, II, III, IV (PA's, Inf., Post.)  [x] (20516) Provided manual therapy to mobilize LE, proximal hip and/or LS spine soft tissue/joints for the purpose of modulating pain, promoting relaxation,  increasing ROM, reducing/eliminating soft tissue swelling/inflammation/restriction, improving soft tissue extensibility and allowing for proper ROM for normal function with self care, mobility, lifting and ambulation. Modalities:  . Deferred. Will ice at home.       Charges:  Timed Code Treatment Minutes: 45   Total Treatment Minutes: 55       [] EVAL (LOW) 97606 (typically 20 minutes face-to-face)   [] EVAL (MOD) 92819 (typically 30 minutes face-to-face)  [] EVAL (HIGH) 32420 (typically 45 minutes face-to-face)  [] RE-EVAL     [x] RD(00331) x 1    [] IONTO (85534)  [x] NMR (58029) x 1   [] VASO (51097)  [] Manual (86477) x     [x] Other:  DN - 1 or 2 muscles (06322)  [x] TA (48008)x 1   [] Mech Traction (14296)  [x] ES(attended) (36596)     [] ES (un) (42760):     GOALS:  Patient stated goal: Be able to do normal activities. [x] Progressing: [] Met: [] Not Met: [] Adjusted    Therapist goals for Patient:   Short Term Goals: To be achieved in: 2 weeks  1. Independent in HEP and progression per patient tolerance, in order to prevent re-injury. [] Progressing: [x] Met: [] Not Met: [] Adjusted  2. Patient will have a decrease in pain to facilitate improvement in movement, function, and ADLs as indicated by Functional Deficits. [x] Progressing: [] Met: [] Not Met: [] Adjusted     Long Term Goals: To be achieved in: 8 weeks  1. Disability index score of 20% or less for the LEFS to assist with reaching prior level of function. [x] Progressing: [] Met: [] Not Met: [] Adjusted   2. Patient will demonstrate increased AROM to 0-120 to allow for proper joint functioning as indicated by patients Functional Deficits. [] Progressing: [x] Met: [] Not Met: [] Adjusted    3. Patient will demonstrate an increase in strength to good proximal hip strength and control, within 5lb HHD in LE to allow for proper functional mobility as indicated by patients Functional Deficits. [x] Progressing: [] Met: [] Not Met: [] Adjusted   4. Patient will demonstrate normal gait mechanics without increased symptoms or restriction to allow him to walk and perform all daily mobility. [] Progressing: [x] Met: [] Not Met: [] Adjusted    5. Patient will be able to navigate stairs up/down with reciprocal gait mechanics without increased symptoms or restriction to allow him normal household mobility without restricted access to certain parts of his home. [x] Progressing: [] Met: [] Not Met: [] Adjusted           Progression Towards Functional goals:  [x] Patient is progressing as expected towards functional goals listed. [] Progression is slowed due to complexities listed. [] Progression has been slowed due to co-morbidities.   [] Plan just implemented, too soon to assess goals progression  [] Other:     ASSESSMENT:  Patient noting onset of posterior medial R knee pain with ambulation, specifically at the point of heel off during stance phase on the R. Unsure exactly what brought this pain on, but primarily only noticing it with ambulation. Patient does not keep R knee fully straight at heel off during R stance phase of gait. Patient very ttp to proximal medial soleus and popliteus medially. This is consistent with deficits still present with gait and when patient complains of pain onset with gait. Addressed patient complaints with DN to R medial soleus proximally and popliteus followed by soleus stretching and significant focus on maintaining straight R knee at the point of heel off during gait. Notes 50% improvements in pain with ambulation at session conclusion. Return to Play: (if applicable)   []  Stage 1: Intro to Strength   []  Stage 2: Return to Run and Strength   []  Stage 3: Return to Jump and Strength   []  Stage 4: Dynamic Strength and Agility   []  Stage 5: Sport Specific Training     []  Ready to Return to Play, Meets All Above Stages   []  Not Ready for Return to Sports   Comments:            Treatment/Activity Tolerance:  [x] Patient tolerated treatment well [] Patient limited by fatique  [] Patient limited by pain  [] Patient limited by other medical complications  [] Other:     Overall Progression Towards Functional goals/ Treatment Progress Update:  [x] Patient is progressing as expected towards functional goals listed. [] Progression is slowed due to complexities/Impairments listed. [] Progression has been slowed due to co-morbidities.   [] Plan just implemented, too soon to assess goals progression <30days   [] Goals require adjustment due to lack of progress  [] Patient is not progressing as expected and requires additional follow up with physician  [] Other    Prognosis for POC: [x] Good [] Fair  [] Poor    Patient requires continued skilled intervention: [x] Yes  [] No        PLAN:   [x] Continue per plan of care [] Alter current plan (see comments)  [] Plan of care initiated [] Hold pending MD visit [] Discharge    Electronically signed by: Abhjiit Leija PT, DPT, MS, SCS    Note: If patient does not return for scheduled/recommended follow up visits, this note will serve as a discharge from care along with the most recent update on progress.

## 2022-03-28 ENCOUNTER — HOSPITAL ENCOUNTER (OUTPATIENT)
Dept: PHYSICAL THERAPY | Age: 62
Setting detail: THERAPIES SERIES
Discharge: HOME | End: 2022-03-28
Payer: COMMERCIAL

## 2022-03-28 PROCEDURE — 97112 NEUROMUSCULAR REEDUCATION: CPT

## 2022-03-28 PROCEDURE — 97110 THERAPEUTIC EXERCISES: CPT

## 2022-03-28 PROCEDURE — 97032 APPL MODALITY 1+ESTIM EA 15: CPT

## 2022-03-28 PROCEDURE — 20560 NDL INSJ W/O NJX 1 OR 2 MUSC: CPT

## 2022-03-28 PROCEDURE — 97530 THERAPEUTIC ACTIVITIES: CPT

## 2022-03-28 NOTE — FLOWSHEET NOTE
[facility], [place]    Physical Therapy Treatment Note/ Progress Report:     Date:  3/28/2022    Patient Name:  Ro Wang    :  1960  MRN: [id number]  Medical/Treatment Diagnosis Information:  · Diagnosis: M23.306 Other meniscus derangements, unspecified meniscus, R knee; M25.561 R knee pain  · Treatment Diagnosis: M23.306 Other meniscus derangements, unspecified meniscus, R knee; M25.561 R knee pain  Insurance/Certification information:  PT Insurance Information: [facility]  Physician Information:  Referring Practitioner: Jimmy Trevizo  Plan of care signed (Y/N):     Date of Patient follow up with Physician: 3/9/22     Progress Report: []  Yes  [x]  No     Functional Scale: 49% disability - LEFS (41/80)   Date: 3/2/22    Date Range for reporting period:  Beginnin21  Endin days or 10 visits    Progress report due (10 Rx/or 30 days whichever is less): 3/3/19    Recertification due (POC duration/ or 90 days whichever is less): 22    Visit # Insurance Allowable Auth Needed   56  (26 post-op for L knee) [facility] []Yes    [x]No     Pain level:  1-2/10 intermittent medial R knee soreness     SUBJECTIVE:  Reports pain on the inside, back of the R knee with walking has been much better since last visit. Noting only very mild remaining discomfort there with walking at start of session.       OBJECTIVE: 3/2/22   Observation:    Test measurements:  127 degrees L knee flexion after stretching     HIP Abd      HIP Ext       HIP IR       HIP ER       Knee ext 0 0   Knee Flex 123 125   Ankle PF       Ankle DF       Ankle In       Ankle Ev       Strength  LEFT RIGHT   HIP Flexors  22.6# 26.8#   HIP Abductors 11.8# 13.6#   HIP Ext       Hip ER       Knee EXT (quad) 18.5# p! 32.3#   Knee Flex (HS) 33.5# 28.6#   Ankle DF       Ankle PF       Ankle Inv       Ankle EV               Circumference  Mid apex  7 cm prox       46.0 cm  51.0 cm     45.5 cm  51.0 cm Gait:  Ambulating without AD; normal mechanics. RESTRICTIONS/PRECAUTIONS: High BP. Previous tobacco use (5 per day). R knee scope, meniscus repair on 9/17/21. L knee scope, meniscus repair on 12/17/21. Exercises/Interventions:     Therapeutic Exercise Resistance Sets/sec Reps Notes   Recumbent bike 3  10 min    Soleus stretch on SB  30s 5    Hamstring stretch Long sitting 30s 5    Ankle pumps HEP   Heelslides w/strap Supine Pushing into the range more, bilat. Quad sets     SAQ over bolster    LAQ @EOB 0-90 Performed with BFR. See below. SLR flexion  Performed with BFR. See below. SLR abduction  Performed with BFR. See below. SLR adduction 2#    [email or web address] LOP (160 mmHg) Blue cuff 30-15-15-15  SLR flexion/sidelying hip abd/LAQ/standing hamstring curls    **Performed at end of session starting 2/14          Leg press -SL 60#  2 10 bilat   Quad extension - Cybex  60 deg angle - active motion   10# on R  15# on L   1  1     10  15   Each LE individually     Hamstring curls - Cybex - SL 25# 1 10 bilat                   Therapeutic Activities       Step ups on 8\"/downs  bilat 2   10    ecc focus   Squat taps to chair Elevated x1 foam 1 10 No UE support                      Manual Intervention       Patella mobs - inferior, medial/lateral - supine, EOB bilat  3 min Gr. II-III   Knee PROM - supine, EOB bilat  4 min Pushing into flexion range more now. NMR Re-education       Biodex - limits of stability   Medium skill level lvl 9 stability      CC TKE Resistance 3.5 5s 20 bilat   Mod. BOSU lunge isos   10s 8 bilat   Sport cord march (yellow) Side/side 2 10 Each position; To begin initiating SLS positions   SLS balance w/SC resistance at knee yellow 20s 5 bilat   Reverse lunge w/slider              Access Code: [email or web address]  URL: [email or web address]  Date: 10/06/2021  Prepared by: Monie Anderson    Exercises    *In addition to pre-op exercises including hamstring stretch, gastroc stretch, heelslides, quad sets, SLR flexion. Spoke with Dr. Rachel Woodall regarding the use of Dry Needling     Dry needling manual therapy: consisted on the placement of 4 needles in the following muscles:  R popliteus, R medial soleus. A 50 mm needle was inserted, piston, rotated, and coned to produce intramuscular mobilization. These techniques were used to restore functional range of motion, reduce muscle spasm and induce healing in the corresponding musculature. (80517)  Clean Technique was utilized today while applying Dry needling treatment. The treatment sites where cleaned with 70% solution of  isopropyl alcohol . The PT washed their hands and utilized treatment gloves along with hand  prior to inserting the needles. All needles where removed and discarded in the appropriate sharps container. MD has given verbal and/or written approval for this treatment. Attended low frequency (1-20Hz) electrical stimulation was utilized in conjunction with Dry Needling:  the Estim was manipulated between all above needles for a period of 10 min. at 2-4 volts. The low frequency electrical stimulation was used to help reduce muscle spasm and help to interrupt /[place] the pain cycle. (61669)     Therapeutic Exercise and NMR EXR  [x] (18770) Provided verbal/tactile cueing for activities related to strengthening, flexibility, endurance, ROM for improvements in LE, proximal hip, and core control with self care, mobility, lifting, ambulation. [x] (57937) Provided verbal/tactile cueing for activities related to improving balance, coordination, kinesthetic sense, posture, motor skill, proprioception  to assist with LE, proximal hip, and core control in self care, mobility, lifting, ambulation and eccentric single leg control.      NMR and Therapeutic Activities:    [x] (80496 or 73050) Provided verbal/tactile cueing for activities related to improving balance, coordination, kinesthetic sense, posture, motor skill, proprioception and motor activation to allow for proper function of core, proximal hip and LE with self care and ADLs  [x] (26418) Gait Re-education- Provided training and instruction to the patient for proper LE, core and proximal hip recruitment and positioning and eccentric body weight control with ambulation re-education including up and down stairs     Home Exercise Program:    [x] (57971) Reviewed/Progressed HEP activities related to strengthening, flexibility, endurance, ROM of core, proximal hip and LE for functional self-care, mobility, lifting and ambulation/stair navigation   [x] (86703)Reviewed/Progressed HEP activities related to improving balance, coordination, kinesthetic sense, posture, motor skill, proprioception of core, proximal hip and LE for self care, mobility, lifting, and ambulation/stair navigation      Manual Treatments:  PROM / STM / Oscillations-Mobs:  G-I, II, III, IV (PA's, Inf., Post.)  [x] (58572) Provided manual therapy to mobilize LE, proximal hip and/or LS spine soft tissue/joints for the purpose of modulating pain, promoting relaxation,  increasing ROM, reducing/eliminating soft tissue swelling/inflammation/restriction, improving soft tissue extensibility and allowing for proper ROM for normal function with self care, mobility, lifting and ambulation. Modalities:  . Deferred. Will ice at home. Charges:  Timed Code Treatment Minutes: 45   Total Treatment Minutes: 55       [] EVAL (LOW) 71027 (typically 20 minutes face-to-face)   [] EVAL (MOD) 29071 (typically 30 minutes face-to-face)  [] EVAL (HIGH) 70247 (typically 45 minutes face-to-face)  [] RE-EVAL     [x] DK(99745) x 1    [] IONTO (08898)  [x] NMR (54662) x 1   [] VASO (68038)  [] Manual (12552) x     [x] Other:  DN - 1 or 2 muscles (79458)  [x] TA (28720)x 1   [] Mech Traction (27251)  [x] ES(attended) (50546)     [] ES (un) (90504):     GOALS:  Patient stated goal: Be able to do normal activities.   [x] Progressing: [] Met: [] Not Met: [] Adjusted    Therapist goals for Patient:   Short Term Goals: To be achieved in: 2 weeks  1. Independent in HEP and progression per patient tolerance, in order to prevent re-injury. [] Progressing: [x] Met: [] Not Met: [] Adjusted  2. Patient will have a decrease in pain to facilitate improvement in movement, function, and ADLs as indicated by Functional Deficits. [x] Progressing: [] Met: [] Not Met: [] Adjusted     Long Term Goals: To be achieved in: 8 weeks  1. Disability index score of 20% or less for the LEFS to assist with reaching prior level of function. [x] Progressing: [] Met: [] Not Met: [] Adjusted   2. Patient will demonstrate increased AROM to 0-120 to allow for proper joint functioning as indicated by patients Functional Deficits. [] Progressing: [x] Met: [] Not Met: [] Adjusted    3. Patient will demonstrate an increase in strength to good proximal hip strength and control, within 5lb HHD in LE to allow for proper functional mobility as indicated by patients Functional Deficits. [x] Progressing: [] Met: [] Not Met: [] Adjusted   4. Patient will demonstrate normal gait mechanics without increased symptoms or restriction to allow him to walk and perform all daily mobility. [] Progressing: [x] Met: [] Not Met: [] Adjusted    5. Patient will be able to navigate stairs up/down with reciprocal gait mechanics without increased symptoms or restriction to allow him normal household mobility without restricted access to certain parts of his home. [x] Progressing: [] Met: [] Not Met: [] Adjusted           Progression Towards Functional goals:  [x] Patient is progressing as expected towards functional goals listed. [] Progression is slowed due to complexities listed. [] Progression has been slowed due to co-morbidities.   [] Plan just implemented, too soon to assess goals progression  [] Other:     ASSESSMENT:  Pain on posterior medial R knee pain with ambulation much better since last session. Only has mild sxs remaining with ambulation. Patient only mildly ttp to R proximal medial soleus today addressed again today with DN and e-stim followed by continued soleus stretching andt focus on terminal knee extension tasks and maintaining straight R knee at the point of heel off during gait to resolve mild remaining sxs. Plan on resorting back to working on patient's continued functional complaints such as difficulty going down stairs and getting up/down from the floor depending on how current sxs in R posterior knee/proximal lower leg are feeling at nv. Return to Play: (if applicable)   []  Stage 1: Intro to Strength   []  Stage 2: Return to Run and Strength   []  Stage 3: Return to Jump and Strength   []  Stage 4: Dynamic Strength and Agility   []  Stage 5: Sport Specific Training     []  Ready to Return to Play, Meets All Above Stages   []  Not Ready for Return to Sports   Comments:            Treatment/Activity Tolerance:  [x] Patient tolerated treatment well [] Patient limited by fatique  [] Patient limited by pain  [] Patient limited by other medical complications  [] Other:     Overall Progression Towards Functional goals/ Treatment Progress Update:  [x] Patient is progressing as expected towards functional goals listed. [] Progression is slowed due to complexities/Impairments listed. [] Progression has been slowed due to co-morbidities.   [] Plan just implemented, too soon to assess goals progression <30days   [] Goals require adjustment due to lack of progress  [] Patient is not progressing as expected and requires additional follow up with physician  [] Other    Prognosis for POC: [x] Good [] Fair  [] Poor    Patient requires continued skilled intervention: [x] Yes  [] No        PLAN:   [x] Continue per plan of care [] Alter current plan (see comments)  [] Plan of care initiated [] Hold pending MD visit [] Discharge    Electronically signed by: Sy Escalante PT, DPT, MS, SCS    Note: If patient does not return for scheduled/recommended follow up visits, this note will serve as a discharge from care along with the most recent update on progress.

## 2022-03-30 ENCOUNTER — HOSPITAL ENCOUNTER (OUTPATIENT)
Dept: PHYSICAL THERAPY | Age: 62
Setting detail: THERAPIES SERIES
Discharge: HOME | End: 2022-03-30
Payer: COMMERCIAL

## 2022-03-30 PROCEDURE — 97140 MANUAL THERAPY 1/> REGIONS: CPT

## 2022-03-30 PROCEDURE — 97110 THERAPEUTIC EXERCISES: CPT

## 2022-03-30 PROCEDURE — 20560 NDL INSJ W/O NJX 1 OR 2 MUSC: CPT

## 2022-03-30 PROCEDURE — 97032 APPL MODALITY 1+ESTIM EA 15: CPT

## 2022-03-30 NOTE — FLOWSHEET NOTE
[facility], [place]    Physical Therapy Treatment Note/ Progress Report:     Date:  3/30/2022    Patient Name:  João Medrano    :  1960  MRN: [id number]  Medical/Treatment Diagnosis Information:  · Diagnosis: M23.306 Other meniscus derangements, unspecified meniscus, R knee; M25.561 R knee pain  · Treatment Diagnosis: M23.306 Other meniscus derangements, unspecified meniscus, R knee; M25.561 R knee pain  Insurance/Certification information:  PT Insurance Information: [facility]  Physician Information:  Referring Practitioner: Romeo Cordova  Plan of care signed (Y/N):     Date of Patient follow up with Physician: 3/9/22     Progress Report: []  Yes  [x]  No     Functional Scale: 49% disability - LEFS (41/80)   Date: 3/2/22    Date Range for reporting period:  Beginnin21  Endin days or 10 visits    Progress report due (10 Rx/or 30 days whichever is less):     Recertification due (POC duration/ or 90 days whichever is less): 22    Visit # Insurance Allowable Auth Needed   62  (26 post-op for L knee) [facility] []Yes    [x]No     Pain level:  1-2/10 intermittent medial R knee soreness     SUBJECTIVE:  Patient reports that her soleus pain returned yesterday while getting dressed. States that it has been sore to walk since then. Felt much better after needling last Wednesday until symptoms returned yesterday.      OBJECTIVE: 3/2/22   Observation:    Test measurements:  127 degrees L knee flexion after stretching     HIP Abd      HIP Ext       HIP IR       HIP ER       Knee ext 0 0   Knee Flex 123 125   Ankle PF       Ankle DF       Ankle In       Ankle Ev       Strength  LEFT RIGHT   HIP Flexors  22.6# 26.8#   HIP Abductors 11.8# 13.6#   HIP Ext       Hip ER       Knee EXT (quad) 18.5# p! 32.3#   Knee Flex (HS) 33.5# 28.6#   Ankle DF       Ankle PF       Ankle Inv       Ankle EV               Circumference  Mid apex  7 cm prox       46.0 cm  51.0 cm     45.5 cm  51.0 cm     Gait:  Ambulating without AD; normal mechanics. RESTRICTIONS/PRECAUTIONS: High BP. Previous tobacco use (5 per day). R knee scope, meniscus repair on 9/17/21. L knee scope, meniscus repair on 12/17/21. Exercises/Interventions:     Therapeutic Exercise Resistance Sets/sec Reps Notes   Recumbent bike 3  10 min    Soleus stretch on SB  30s 5    Hamstring stretch Long sitting 30s 5    Ankle pumps HEP   Heelslides w/strap Supine Pushing into the range more, bilat. Quad sets     SAQ over bolster    LAQ @EOB 0-90 Performed with BFR. See below. SLR flexion  Performed with BFR. See below. SLR abduction  Performed with BFR. See below. SLR adduction 2#    [email or web address] LOP (160 mmHg) Blue cuff 30-15-15-15  SLR flexion/sidelying hip abd/LAQ/standing hamstring curls    **Performed at end of session starting 2/14          Leg press -SL 60#  2 10 bilat   Quad extension - Cybex  60 deg angle - active motion   10# on R  15# on L   1  1     10  15   Each LE individually     Hamstring curls - Cybex - SL 25# 1 10 bilat                   Therapeutic Activities       Step ups on 8\"/downs  bilat 2   10    ecc focus   Squat taps to chair Elevated x1 foam 1 10 No UE support                      Manual Intervention       Patella mobs - inferior, medial/lateral - supine, EOB bilat  3 min Gr. II-III   Knee PROM - supine, EOB bilat  4 min Pushing into flexion range more now. NMR Re-education       Biodex - limits of stability   Medium skill level lvl 9 stability      CC TKE Resistance 3.5 5s 20 bilat   Mod. BOSU lunge isos   10s 8 bilat   Sport cord march (yellow) Side/side 2 10 Each position; To begin initiating SLS positions   SLS balance w/SC resistance at knee yellow 20s 5 bilat   Reverse lunge w/slider              Access Code: [email or web address]  URL: [email or web address]  Date: 10/06/2021  Prepared by: Sy Escalante    Exercises    *In addition to pre-op exercises including hamstring stretch, gastroc stretch, heelslides, quad sets, SLR flexion. Spoke with Dr. Chavo Lam regarding the use of Dry Needling     Dry needling manual therapy: consisted on the placement of 4 needles in the following muscles:  R popliteus, R medial soleus. A 50 mm needle was inserted, piston, rotated, and coned to produce intramuscular mobilization. These techniques were used to restore functional range of motion, reduce muscle spasm and induce healing in the corresponding musculature. (58096)  Clean Technique was utilized today while applying Dry needling treatment. The treatment sites where cleaned with 70% solution of  isopropyl alcohol . The PT washed their hands and utilized treatment gloves along with hand  prior to inserting the needles. All needles where removed and discarded in the appropriate sharps container. MD has given verbal and/or written approval for this treatment. Attended low frequency (1-20Hz) electrical stimulation was utilized in conjunction with Dry Needling:  the Estim was manipulated between all above needles for a period of 10 min. at 2-4 volts. The low frequency electrical stimulation was used to help reduce muscle spasm and help to interrupt /Mentmore the pain cycle. (51564)     Therapeutic Exercise and NMR EXR  [x] (39184) Provided verbal/tactile cueing for activities related to strengthening, flexibility, endurance, ROM for improvements in LE, proximal hip, and core control with self care, mobility, lifting, ambulation. [x] (69715) Provided verbal/tactile cueing for activities related to improving balance, coordination, kinesthetic sense, posture, motor skill, proprioception  to assist with LE, proximal hip, and core control in self care, mobility, lifting, ambulation and eccentric single leg control.      NMR and Therapeutic Activities:    [x] (41642 or 91201) Provided verbal/tactile cueing for activities related to improving balance, coordination, kinesthetic sense, posture, motor skill, proprioception and motor activation to allow for proper function of core, proximal hip and LE with self care and ADLs  [x] (64552) Gait Re-education- Provided training and instruction to the patient for proper LE, core and proximal hip recruitment and positioning and eccentric body weight control with ambulation re-education including up and down stairs     Home Exercise Program:    [x] (18062) Reviewed/Progressed HEP activities related to strengthening, flexibility, endurance, ROM of core, proximal hip and LE for functional self-care, mobility, lifting and ambulation/stair navigation   [x] (15110)Reviewed/Progressed HEP activities related to improving balance, coordination, kinesthetic sense, posture, motor skill, proprioception of core, proximal hip and LE for self care, mobility, lifting, and ambulation/stair navigation      Manual Treatments:  PROM / STM / Oscillations-Mobs:  G-I, II, III, IV (PA's, Inf., Post.)  [x] (74038) Provided manual therapy to mobilize LE, proximal hip and/or LS spine soft tissue/joints for the purpose of modulating pain, promoting relaxation,  increasing ROM, reducing/eliminating soft tissue swelling/inflammation/restriction, improving soft tissue extensibility and allowing for proper ROM for normal function with self care, mobility, lifting and ambulation. Modalities:  . Deferred. Will ice at home.       Charges:  Timed Code Treatment Minutes: 45   Total Treatment Minutes: 55       [] EVAL (LOW) 04111 (typically 20 minutes face-to-face)   [] EVAL (MOD) 23799 (typically 30 minutes face-to-face)  [] EVAL (HIGH) 68141 (typically 45 minutes face-to-face)  [] RE-EVAL     [x] TU(78482) x 1    [] IONTO (30690)  [x] NMR (00440) x 1   [] VASO (85539)  [] Manual (89383) x     [x] Other:  DN - 1 or 2 muscles (68495)  [x] TA (12078)x 1   [] Mech Traction (04260)  [x] ES(attended) (57012)     [] ES (un) (30027):     GOALS:  Patient stated goal: Be able to do normal activities. [x] Progressing: [] Met: [] Not Met: [] Adjusted    Therapist goals for Patient:   Short Term Goals: To be achieved in: 2 weeks  1. Independent in HEP and progression per patient tolerance, in order to prevent re-injury. [] Progressing: [x] Met: [] Not Met: [] Adjusted  2. Patient will have a decrease in pain to facilitate improvement in movement, function, and ADLs as indicated by Functional Deficits. [x] Progressing: [] Met: [] Not Met: [] Adjusted     Long Term Goals: To be achieved in: 8 weeks  1. Disability index score of 20% or less for the LEFS to assist with reaching prior level of function. [x] Progressing: [] Met: [] Not Met: [] Adjusted   2. Patient will demonstrate increased AROM to 0-120 to allow for proper joint functioning as indicated by patients Functional Deficits. [] Progressing: [x] Met: [] Not Met: [] Adjusted    3. Patient will demonstrate an increase in strength to good proximal hip strength and control, within 5lb HHD in LE to allow for proper functional mobility as indicated by patients Functional Deficits. [x] Progressing: [] Met: [] Not Met: [] Adjusted   4. Patient will demonstrate normal gait mechanics without increased symptoms or restriction to allow him to walk and perform all daily mobility. [] Progressing: [x] Met: [] Not Met: [] Adjusted    5. Patient will be able to navigate stairs up/down with reciprocal gait mechanics without increased symptoms or restriction to allow him normal household mobility without restricted access to certain parts of his home. [x] Progressing: [] Met: [] Not Met: [] Adjusted           Progression Towards Functional goals:  [x] Patient is progressing as expected towards functional goals listed. [] Progression is slowed due to complexities listed. [] Progression has been slowed due to co-morbidities. [] Plan just implemented, too soon to assess goals progression  [] Other:     ASSESSMENT:  Good tolerance to treatment. Appropriately fatigued at conclusion. Felt relief with needling. Focused treatment on terminal knee extension cass at toe off. Patient felt a good challenge with eccentric lateral step down. Return to Play: (if applicable)   []  Stage 1: Intro to Strength   []  Stage 2: Return to Run and Strength   []  Stage 3: Return to Jump and Strength   []  Stage 4: Dynamic Strength and Agility   []  Stage 5: Sport Specific Training     []  Ready to Return to Play, Meets All Above Stages   []  Not Ready for Return to Sports   Comments:            Treatment/Activity Tolerance:  [x] Patient tolerated treatment well [] Patient limited by fatique  [] Patient limited by pain  [] Patient limited by other medical complications  [] Other:     Overall Progression Towards Functional goals/ Treatment Progress Update:  [x] Patient is progressing as expected towards functional goals listed. [] Progression is slowed due to complexities/Impairments listed. [] Progression has been slowed due to co-morbidities. [] Plan just implemented, too soon to assess goals progression <30days   [] Goals require adjustment due to lack of progress  [] Patient is not progressing as expected and requires additional follow up with physician  [] Other    Prognosis for POC: [x] Good [] Fair  [] Poor    Patient requires continued skilled intervention: [x] Yes  [] No        PLAN:   [x] Continue per plan of care [] Alter current plan (see comments)  [] Plan of care initiated [] Hold pending MD visit [] Discharge    Electronically signed by: Autumn Subramanian PTA, DPT, MS, SCS    Note: If patient does not return for scheduled/recommended follow up visits, this note will serve as a discharge from care along with the most recent update on progress.

## 2022-04-06 ENCOUNTER — HOSPITAL ENCOUNTER (OUTPATIENT)
Dept: PHYSICAL THERAPY | Age: 62
Setting detail: THERAPIES SERIES
Discharge: HOME | End: 2022-04-06
Payer: COMMERCIAL

## 2022-04-06 PROCEDURE — 97112 NEUROMUSCULAR REEDUCATION: CPT

## 2022-04-06 PROCEDURE — 97110 THERAPEUTIC EXERCISES: CPT

## 2022-04-06 PROCEDURE — 97530 THERAPEUTIC ACTIVITIES: CPT

## 2022-04-06 NOTE — FLOWSHEET NOTE
[facility], [place]    Physical Therapy Treatment Note/ Progress Report:     Date:  2022    Patient Name:  Russell Jimenez    :  1960  MRN: [id number]  Medical/Treatment Diagnosis Information:  · Diagnosis: M23.306 Other meniscus derangements, unspecified meniscus, R knee; M25.561 R knee pain  · Treatment Diagnosis: M23.306 Other meniscus derangements, unspecified meniscus, R knee; M25.561 R knee pain  Insurance/Certification information:  PT Insurance Information: [facility]  Physician Information:  Referring Practitioner: Kathia Thorpe  Plan of care signed (Y/N):     Date of Patient follow up with Physician: 3/9/22     Progress Report: []  Yes  [x]  No     Functional Scale: 49% disability - LEFS (41/80)   Date: 3/2/22    Date Range for reporting period:  Beginnin21  Endin days or 10 visits    Progress report due (10 Rx/or 30 days whichever is less): 22 Progress note nv. Recertification due (POC duration/ or 90 days whichever is less): 22    Visit # Insurance Allowable Auth Needed   58  (27 post-op for L knee) [facility] []Yes    [x]No     Pain level:  0/10    SUBJECTIVE:  Reports calf pain has been better since last visit. Not having that pain with walking anymore. Going to the pool to exercise 2-3x per week. Has not yet initiated use of machines for strengthening at the rec center yet.     OBJECTIVE: 3/2/22   Observation:    Test measurements:  127 degrees L knee flexion after stretching     HIP Abd      HIP Ext       HIP IR       HIP ER       Knee ext 0 0   Knee Flex 123 125   Ankle PF       Ankle DF       Ankle In       Ankle Ev       Strength  LEFT RIGHT   HIP Flexors  22.6# 26.8#   HIP Abductors 11.8# 13.6#   HIP Ext       Hip ER       Knee EXT (quad) 18.5# p! 32.3#   Knee Flex (HS) 33.5# 28.6#   Ankle DF       Ankle PF       Ankle Inv       Ankle EV               Circumference  Mid apex  7 cm prox       46.0 cm  51.0 cm     45.5 cm  51.0 cm     Gait:  Ambulating without AD; normal mechanics. RESTRICTIONS/PRECAUTIONS: High BP. Previous tobacco use (5 per day). R knee scope, meniscus repair on 9/17/21. L knee scope, meniscus repair on 12/17/21. Exercises/Interventions:     Therapeutic Exercise Resistance Sets/sec Reps Notes   Recumbent bike 3  10 min    Soleus stretch on SB  30s 5    Hamstring stretch Long sitting 30s 5    Ankle pumps HEP   Heelslides w/strap Supine Pushing into the range more, bilat. Quad sets     SAQ over bolster    LAQ @EOB 0-90 Performed with BFR. See below. SLR flexion  Performed with BFR. See below. SLR abduction  Performed with BFR. See below. SLR adduction 2#    [email or web address] LOP (160 mmHg) Blue cuff 30-15-15-15  SLR flexion/sidelying hip abd/LAQ/standing hamstring curls    **Performed at end of session starting 2/14          Leg press -SL 60#  2 10 bilat   Quad extension - Cybex  Active motion   5# on R  10# on L   1  1     15  15   Each LE individually  45 deg  60 deg   Hamstring curls - Cybex - SL 25# 1 10 bilat   Minisquat with heel raise - soleus bias  1 15 bilat   Seated soleus raises - 15# KB (blue) - toes on 1/2 bolster  2 10 bilat                          Therapeutic Activities       Step ups on 8\"/downs  bilat ecc focus   Squat taps to chair Elevated x1 foam No UE support   TRX staggered stance squat tap - x1 foam  2 10 bilat                 Manual Intervention       Patella mobs - inferior, medial/lateral - supine, EOB bilat  3 min Gr. II-III   Knee PROM - supine, EOB bilat  4 min Pushing into flexion range more now. NMR Re-education       Biodex - limits of stability   Medium skill level lvl 9 stability      CC TKE Resistance 3.5 bilat   Mod. BOSU lunge isos   10s 8 bilat   Sport cord march (yellow) Side/side 2 10 Each position;  To begin initiating SLS positions   SLS balance w/SC resistance at knee yellow 20s 5 bilat   Reverse lunge w/slider  1 10 bilat control. NMR and Therapeutic Activities:    [x] (74627 or 75793) Provided verbal/tactile cueing for activities related to improving balance, coordination, kinesthetic sense, posture, motor skill, proprioception and motor activation to allow for proper function of core, proximal hip and LE with self care and ADLs  [x] (34661) Gait Re-education- Provided training and instruction to the patient for proper LE, core and proximal hip recruitment and positioning and eccentric body weight control with ambulation re-education including up and down stairs     Home Exercise Program:    [x] (83538) Reviewed/Progressed HEP activities related to strengthening, flexibility, endurance, ROM of core, proximal hip and LE for functional self-care, mobility, lifting and ambulation/stair navigation   [x] (47149)Reviewed/Progressed HEP activities related to improving balance, coordination, kinesthetic sense, posture, motor skill, proprioception of core, proximal hip and LE for self care, mobility, lifting, and ambulation/stair navigation      Manual Treatments:  PROM / STM / Oscillations-Mobs:  G-I, II, III, IV (PA's, Inf., Post.)  [x] (81811) Provided manual therapy to mobilize LE, proximal hip and/or LS spine soft tissue/joints for the purpose of modulating pain, promoting relaxation,  increasing ROM, reducing/eliminating soft tissue swelling/inflammation/restriction, improving soft tissue extensibility and allowing for proper ROM for normal function with self care, mobility, lifting and ambulation. Modalities:  . Deferred. Will ice at home.       Charges:  Timed Code Treatment Minutes: 50   Total Treatment Minutes: 50       [] EVAL (LOW) 56075 (typically 20 minutes face-to-face)   [] EVAL (MOD) 20343 (typically 30 minutes face-to-face)  [] EVAL (HIGH) 82075 (typically 45 minutes face-to-face)  [] RE-EVAL     [x] KAUFMAN(46152) x 1    [] IONTO (24194)  [x] NMR (60635) x 1   [] VASO (89076)  [] Manual (50269) x     [] Other:  DN - 1 or 2 muscles (20560)  [x] TA (14435)x 1   [] Mech Traction (31873)  [] ES(attended) (68808)     [] ES (un) (32520):     GOALS:  Patient stated goal: Be able to do normal activities. [x] Progressing: [] Met: [] Not Met: [] Adjusted    Therapist goals for Patient:   Short Term Goals: To be achieved in: 2 weeks  1. Independent in HEP and progression per patient tolerance, in order to prevent re-injury. [] Progressing: [x] Met: [] Not Met: [] Adjusted  2. Patient will have a decrease in pain to facilitate improvement in movement, function, and ADLs as indicated by Functional Deficits. [x] Progressing: [] Met: [] Not Met: [] Adjusted     Long Term Goals: To be achieved in: 8 weeks  1. Disability index score of 20% or less for the LEFS to assist with reaching prior level of function. [x] Progressing: [] Met: [] Not Met: [] Adjusted   2. Patient will demonstrate increased AROM to 0-120 to allow for proper joint functioning as indicated by patients Functional Deficits. [] Progressing: [x] Met: [] Not Met: [] Adjusted    3. Patient will demonstrate an increase in strength to good proximal hip strength and control, within 5lb HHD in LE to allow for proper functional mobility as indicated by patients Functional Deficits. [x] Progressing: [] Met: [] Not Met: [] Adjusted   4. Patient will demonstrate normal gait mechanics without increased symptoms or restriction to allow him to walk and perform all daily mobility. [] Progressing: [x] Met: [] Not Met: [] Adjusted    5. Patient will be able to navigate stairs up/down with reciprocal gait mechanics without increased symptoms or restriction to allow him normal household mobility without restricted access to certain parts of his home. [x] Progressing: [] Met: [] Not Met: [] Adjusted           Progression Towards Functional goals:  [x] Patient is progressing as expected towards functional goals listed. [] Progression is slowed due to complexities listed.   [] Progression has been slowed due to co-morbidities. [] Plan just implemented, too soon to assess goals progression  [] Other:     ASSESSMENT:   R posterior medial knee and proximal medial calf pain with ambulation improved. No longer having that pain with ambulation. Continued to incorporate some soleus strengthening tasks during session to avoid further irritation of proximal medial soleus and popliteus. Able to progress functional strength tasks today to include TRX staggered stance squats, reverse lunge with sliders, and lateral TB walks. Patient wants to eventually be able to get down on the floor to retried objects from under the bed. Return to Play: (if applicable)   []  Stage 1: Intro to Strength   []  Stage 2: Return to Run and Strength   []  Stage 3: Return to Jump and Strength   []  Stage 4: Dynamic Strength and Agility   []  Stage 5: Sport Specific Training     []  Ready to Return to Play, Meets All Above Stages   []  Not Ready for Return to Sports   Comments:            Treatment/Activity Tolerance:  [x] Patient tolerated treatment well [] Patient limited by fatique  [] Patient limited by pain  [] Patient limited by other medical complications  [] Other:     Overall Progression Towards Functional goals/ Treatment Progress Update:  [x] Patient is progressing as expected towards functional goals listed. [] Progression is slowed due to complexities/Impairments listed. [] Progression has been slowed due to co-morbidities.   [] Plan just implemented, too soon to assess goals progression <30days   [] Goals require adjustment due to lack of progress  [] Patient is not progressing as expected and requires additional follow up with physician  [] Other    Prognosis for POC: [x] Good [] Fair  [] Poor    Patient requires continued skilled intervention: [x] Yes  [] No        PLAN:   [x] Continue per plan of care [] Alter current plan (see comments)  [] Plan of care initiated [] Hold pending MD visit [] Discharge    Electronically signed by: Boo Cormier, PT, DPT, MS, SCS    Note: If patient does not return for scheduled/recommended follow up visits, this note will serve as a discharge from care along with the most recent update on progress.

## 2022-04-08 ENCOUNTER — HOSPITAL ENCOUNTER (OUTPATIENT)
Dept: PHYSICAL THERAPY | Age: 62
Setting detail: THERAPIES SERIES
Discharge: HOME | End: 2022-04-08
Payer: COMMERCIAL

## 2022-04-08 PROCEDURE — 97112 NEUROMUSCULAR REEDUCATION: CPT

## 2022-04-08 PROCEDURE — 97110 THERAPEUTIC EXERCISES: CPT

## 2022-04-08 PROCEDURE — 97530 THERAPEUTIC ACTIVITIES: CPT

## 2022-04-08 NOTE — PLAN OF CARE
[facility] [place]      Physical Therapy Re-Certification Plan of Care/MD UPDATE      Dear Dr. Doug Villalobos,    We had the pleasure of treating the following patient for physical therapy services at [address] [place] [address]. A summary of our findings can be found in the updated assessment below. This includes our plan of care. If you have any questions or concerns regarding these findings, please do not hesitate to contact me at the office phone number checked above. Thank you for the referral.     Physician Signature:________________________________Date:__________________  By signing above (or electronic signature), therapists plan is approved by physician    Date Range Of Visits: 3/2/22 to 4/8/22  Total Visits to Date: 61 (29 post-op for L knee - most recent)  Overall Response to Treatment:   [x]Patient is responding well to treatment and improvement is noted with regards  to goals   []Patient should continue to improve in reasonable time if they continue HEP   []Patient has plateaued and is no longer responding to skilled PT intervention    []Patient is getting worse and would benefit from return to referring MD   []Patient unable to adhere to initial POC   [x]Other: Patient is approximately 15 weeks s/p L knee scope, meniscus root repair on 12/17/21. Patient also had a R knee scope, meniscus root repair on 9/17/21. Patient is ambulating with normal gait mechanics without assistive device. Focus of PT since previous update has been on general strength, endurance, balance, and functional progression with eccentric emphasis to maximize gains. Patient has made solid gains in strength and overall functional tolerance as can be seen from updated strength measures above.  This is consistent with subjective functional improvement on the LEFS from 49% disability to 31% disability, however, strength deficits are still present affecting patient's ability to perform tasks like kneeling down on to the floor to get something out from under her bed. Patient will benefit from skilled PT to address remaining strength and functional deficits to enable full, safe return to PLOF with minimal risk of future knee injury. Physical Therapy Treatment Note/ Progress Report:     Date:  2022    Patient Name:  Toshia Monge    :  1960  MRN: [id number]  Medical/Treatment Diagnosis Information:  · Diagnosis: M23.306 Other meniscus derangements, unspecified meniscus, R knee; M25.561 R knee pain  · Treatment Diagnosis: M23.306 Other meniscus derangements, unspecified meniscus, R knee; M25.561 R knee pain  Insurance/Certification information:  PT Insurance Information: [facility]  Physician Information:  Referring Practitioner: Akua Parker  Plan of care signed (Y/N):     Date of Patient follow up with Physician:      Progress Report: [x]  Yes  []  No     Functional Scale: 31% disability - LEFS (55/80)   Date: 22    Date Range for reporting period:  Beginnin21  Endin days or 10 visits    Progress report due (10 Rx/or 30 days whichever is less): 48    Recertification due (POC duration/ or 90 days whichever is less): 22    Visit # Insurance Allowable Auth Needed   59  (28 post-op for L knee) [facility] []Yes    [x]No     Pain level:  0/10    SUBJECTIVE:  No calf pain since last week. Not having calf pain with walking anymore. Going to the pool to exercise 2-3x per week. Back taking adult courses at [employer]. Patient really enjoys these classes because they are things like \"how to pair wine with a meal\" aimed at folks of half-way age.      OBJECTIVE: 22   Observation:    Test measurements:  127 degrees L knee flexion after stretching     HIP Abd      HIP Ext       HIP IR       HIP ER       Knee ext 0 0   Knee Flex 123 125   Ankle PF       Ankle DF       Ankle In       Ankle Ev       Strength  LEFT RIGHT   HIP Flexors  23.8# 25.0# HIP Abductors 11.8# 14.0#   HIP Ext       Hip ER       Knee EXT (quad) 43.2#  47.4#   Knee Flex (HS) 28.6# 35.9#   Ankle DF       Ankle PF       Ankle Inv       Ankle EV               Circumference  Mid apex  7 cm prox       46.0 cm  51.0 cm     45.5 cm  51.0 cm     Gait:  Ambulating without AD; normal mechanics. RESTRICTIONS/PRECAUTIONS: High BP. Previous tobacco use (5 per day). R knee scope, meniscus repair on 9/17/21. L knee scope, meniscus repair on 12/17/21. Exercises/Interventions:     Therapeutic Exercise Resistance Sets/sec Reps Notes   Recumbent bike 3  10 min    Soleus stretch on SB  30s 5    Hamstring stretch Long sitting 30s 5    Ankle pumps HEP   Heelslides w/strap Supine Pushing into the range more, bilat. Quad sets     SAQ over bolster    LAQ @EOB 0-90 Performed with BFR. See below. SLR flexion  Performed with BFR. See below. SLR abduction  Performed with BFR. See below. SLR adduction 2#    Song@"[employer] LOP (160 mmHg) Blue cuff 30-15-15-15  SLR flexion/sidelying hip abd/LAQ/standing hamstring curls    **Performed at end of session starting 2/14          Leg press -SL 65#  2 10 bilat   Quad extension - Cybex - SL  Active motion 5# on R  10# on L 1  1   15  15   45 deg  60 deg   Hamstring curls - Cybex - SL 15# 1 10 bilat   Minisquat with heel raise - soleus bias  1 15 bilat   Seated soleus raises - 15# KB (blue) - toes on 1/2 bolster  2 10 bilat                          Therapeutic Activities       Step ups on 8\"/downs  bilat ecc focus   Squat taps to chair Elevated x1 foam No UE support   TRX staggered stance squat tap - x1 foam  2 10 bilat   Lateral/side step up on 6\"  1 15 bilat          Manual Intervention       Patella mobs - inferior, medial/lateral - supine, EOB bilat  3 min Gr. II-III   Knee PROM - supine, EOB bilat  4 min Pushing into flexion range more now.                                NMR Re-education       Biodex - limits of stability   Medium skill level lvl 9 stability CC TKE Resistance 3.5 bilat   Mod. BOSU lunge isos   10s 8 bilat   Sport cord march (yellow) Side/side 2 10 Each position; To begin initiating SLS positions   SLS balance w/SC resistance at knee yellow 20s 5 bilat   Reverse lunge w/slider  2 10 bilat            Access Code: [email or web address]  URL: [email or web address]  Date: 10/06/2021  Prepared by: Libia Bradley    Exercises    *In addition to pre-op exercises including hamstring stretch, gastroc stretch, heelslides, quad sets, SLR flexion. Therapeutic Exercise and NMR EXR  [x] (20969) Provided verbal/tactile cueing for activities related to strengthening, flexibility, endurance, ROM for improvements in LE, proximal hip, and core control with self care, mobility, lifting, ambulation. [x] (15516) Provided verbal/tactile cueing for activities related to improving balance, coordination, kinesthetic sense, posture, motor skill, proprioception  to assist with LE, proximal hip, and core control in self care, mobility, lifting, ambulation and eccentric single leg control.      NMR and Therapeutic Activities:    [x] (80034 or 59005) Provided verbal/tactile cueing for activities related to improving balance, coordination, kinesthetic sense, posture, motor skill, proprioception and motor activation to allow for proper function of core, proximal hip and LE with self care and ADLs  [x] (40273) Gait Re-education- Provided training and instruction to the patient for proper LE, core and proximal hip recruitment and positioning and eccentric body weight control with ambulation re-education including up and down stairs     Home Exercise Program:    [x] (69575) Reviewed/Progressed HEP activities related to strengthening, flexibility, endurance, ROM of core, proximal hip and LE for functional self-care, mobility, lifting and ambulation/stair navigation   [x] (41709)Reviewed/Progressed HEP activities related to improving balance, coordination, kinesthetic sense, posture, motor skill, proprioception of core, proximal hip and LE for self care, mobility, lifting, and ambulation/stair navigation      Manual Treatments:  PROM / STM / Oscillations-Mobs:  G-I, II, III, IV (PA's, Inf., Post.)  [x] (60922) Provided manual therapy to mobilize LE, proximal hip and/or LS spine soft tissue/joints for the purpose of modulating pain, promoting relaxation,  increasing ROM, reducing/eliminating soft tissue swelling/inflammation/restriction, improving soft tissue extensibility and allowing for proper ROM for normal function with self care, mobility, lifting and ambulation. Modalities:  . Deferred. Will ice at home. Charges:  Timed Code Treatment Minutes: 50   Total Treatment Minutes: 50       [] EVAL (LOW) 43430 (typically 20 minutes face-to-face)   [] EVAL (MOD) 68265 (typically 30 minutes face-to-face)  [] EVAL (HIGH) 16558 (typically 45 minutes face-to-face)  [] RE-EVAL     [x] WB(17213) x 1    [] IONTO (43093)  [x] NMR (79383) x 1   [] VASO (57259)  [] Manual (05672) x     [] Other:  DN - 1 or 2 muscles (91513)  [x] TA (70612)x 1   [] Mech Traction (52343)  [] ES(attended) (91687)     [] ES (un) (42449):     GOALS:  Patient stated goal: Be able to do normal activities. [x] Progressing: [] Met: [] Not Met: [] Adjusted    Therapist goals for Patient:   Short Term Goals: To be achieved in: 2 weeks  1. Independent in HEP and progression per patient tolerance, in order to prevent re-injury. [] Progressing: [x] Met: [] Not Met: [] Adjusted  2. Patient will have a decrease in pain to facilitate improvement in movement, function, and ADLs as indicated by Functional Deficits. [x] Progressing: [] Met: [] Not Met: [] Adjusted     Long Term Goals: To be achieved in: 8 weeks  1. Disability index score of 20% or less for the LEFS to assist with reaching prior level of function. [x] Progressing: [] Met: [] Not Met: [] Adjusted  Comment:  31% disability on LEFS as of 4/8/22   2.  Patient will demonstrate increased AROM to 0-120 to allow for proper joint functioning as indicated by patients Functional Deficits. [] Progressing: [x] Met: [] Not Met: [] Adjusted    3. Patient will demonstrate an increase in strength to good proximal hip strength and control, within 5lb HHD in LE to allow for proper functional mobility as indicated by patients Functional Deficits. [x] Progressing: [] Met: [] Not Met: [] Adjusted   4. Patient will demonstrate normal gait mechanics without increased symptoms or restriction to allow him to walk and perform all daily mobility. [] Progressing: [x] Met: [] Not Met: [] Adjusted    5. Patient will be able to navigate stairs up/down with reciprocal gait mechanics without increased symptoms or restriction to allow her normal household mobility without restricted access to certain parts of his home. [x] Progressing: [] Met: [] Not Met: [] Adjusted           Progression Towards Functional goals:  [x] Patient is progressing as expected towards functional goals listed. [] Progression is slowed due to complexities listed. [] Progression has been slowed due to co-morbidities. [] Plan just implemented, too soon to assess goals progression  [] Other:     ASSESSMENT:   Patient is approximately 15 weeks s/p L knee scope, meniscus root repair on 12/17/21. Patient also had a R knee scope, meniscus root repair on 9/17/21. Patient is ambulating with normal gait mechanics without assistive device. Focus of PT since previous update has been on general strength, endurance, balance, and functional progression with eccentric emphasis to maximize gains. Patient has made solid gains in strength and overall functional tolerance as can be seen from updated strength measures above.  This is consistent with subjective functional improvement on the LEFS from 49% disability to 31% disability, however, strength deficits are still present affecting patient's ability to perform tasks like kneeling down on to the floor to get something out from under her bed. Patient will benefit from skilled PT to address remaining strength and functional deficits to enable full, safe return to PLOF with minimal risk of future knee injury. Return to Play: (if applicable)   []  Stage 1: Intro to Strength   []  Stage 2: Return to Run and Strength   []  Stage 3: Return to Jump and Strength   []  Stage 4: Dynamic Strength and Agility   []  Stage 5: Sport Specific Training     []  Ready to Return to Play, Meets All Above Stages   []  Not Ready for Return to Sports   Comments:            Treatment/Activity Tolerance:  [x] Patient tolerated treatment well [] Patient limited by fatique  [] Patient limited by pain  [] Patient limited by other medical complications  [] Other:     Overall Progression Towards Functional goals/ Treatment Progress Update:  [x] Patient is progressing as expected towards functional goals listed. [] Progression is slowed due to complexities/Impairments listed. [] Progression has been slowed due to co-morbidities. [] Plan just implemented, too soon to assess goals progression <30days   [] Goals require adjustment due to lack of progress  [] Patient is not progressing as expected and requires additional follow up with physician  [] Other    Prognosis for POC: [x] Good [] Fair  [] Poor    Patient requires continued skilled intervention: [x] Yes  [] No        PLAN:   [x] Continue per plan of care [] Alter current plan (see comments)  [] Plan of care initiated [] Hold pending MD visit [] Discharge    Electronically signed by: Jania Womack, PT, DPT, MS, SCS    Note: If patient does not return for scheduled/recommended follow up visits, this note will serve as a discharge from care along with the most recent update on progress.

## 2022-04-13 ENCOUNTER — HOSPITAL ENCOUNTER (OUTPATIENT)
Dept: PHYSICAL THERAPY | Age: 62
Setting detail: THERAPIES SERIES
Discharge: HOME | End: 2022-04-13
Payer: COMMERCIAL

## 2022-04-13 PROCEDURE — 97112 NEUROMUSCULAR REEDUCATION: CPT

## 2022-04-13 PROCEDURE — 97110 THERAPEUTIC EXERCISES: CPT

## 2022-04-13 PROCEDURE — 97530 THERAPEUTIC ACTIVITIES: CPT

## 2022-04-13 NOTE — FLOWSHEET NOTE
[facility]    Physical Therapy Treatment Note/ Progress Report:     Date:  2022    Patient Name:  Paola Harris    :  1960  MRN: [id number]  Medical/Treatment Diagnosis Information:  · Diagnosis: M23.306 Other meniscus derangements, unspecified meniscus, R knee; M25.561 R knee pain  · Treatment Diagnosis: M23.306 Other meniscus derangements, unspecified meniscus, R knee; M25.561 R knee pain  Insurance/Certification information:  PT Insurance Information: [facility]  Physician Information:  Referring Practitioner: Luann Galeas  Plan of care signed (Y/N):     Date of Patient follow up with Physician: end      Progress Report: []  Yes  [x]  No     Functional Scale: 31% disability - LEFS (55/80)   Date: 22    Date Range for reporting period:  Beginnin21  Endin days or 10 visits    Progress report due (10 Rx/or 30 days whichever is less): 60    Recertification due (POC duration/ or 90 days whichever is less): 22    Visit # Insurance Allowable Auth Needed   60  (29 post-op for L knee) [facility] []Yes    [x]No     Pain level:  0/10    SUBJECTIVE:  No calf pain since last week. Not having calf pain with walking anymore. Going to the pool to exercise 2-3x per week. Also performing [employer] exercises provided through her insurance daily. Sees Dr. Michelle Swanson for follow up next Wednesday.     OBJECTIVE: 22   Observation:    Test measurements:  127 degrees L knee flexion after stretching     HIP Abd      HIP Ext       HIP IR       HIP ER       Knee ext 0 0   Knee Flex 123 125   Ankle PF       Ankle DF       Ankle In       Ankle Ev       Strength  LEFT RIGHT   HIP Flexors  23.8# 25.0#   HIP Abductors 11.8# 14.0#   HIP Ext       Hip ER       Knee EXT (quad) 43.2#  47.4#   Knee Flex (HS) 28.6# 35.9#   Ankle DF       Ankle PF       Ankle Inv       Ankle EV               Circumference  Mid apex  7 cm prox       46.0 cm  51.0 cm 45.5 cm  51.0 cm     Gait:  Ambulating without AD; normal mechanics. RESTRICTIONS/PRECAUTIONS: High BP. Previous tobacco use (5 per day). R knee scope, meniscus repair on 9/17/21. L knee scope, meniscus repair on 12/17/21. Exercises/Interventions:     Therapeutic Exercise Resistance Sets/sec Reps Notes   Recumbent bike 3  10 min    Soleus stretch on SB  30s 5    Hamstring stretch Long sitting 30s 5    Ankle pumps HEP   Heelslides w/strap Supine Pushing into the range more, bilat. Quad sets     SAQ over bolster    LAQ @EOB 0-90 Performed with BFR. See below. SLR flexion  Performed with BFR. See below. SLR abduction  Performed with BFR. See below. SLR adduction 2#    Song@[employer] LOP (160 mmHg) Blue cuff 30-15-15-15  SLR flexion/sidelying hip abd/LAQ/standing hamstring curls    **Performed at end of session starting 2/14          Leg press -SL 70#  2 10 bilat   Quad extension - Cybex - SL  Active motion 5# on R  10# on L 1  1   15  15   45 deg  60 deg   Hamstring curls - Cybex - SL 15# 1 10 bilat   Minisquat with heel raise - soleus bias  1 15 bilat   Seated soleus raises - 15# KB (blue) - toes on 1/2 bolster  2 10 bilat                          Therapeutic Activities       Step ups on 8\"/ecc downs  bilat 2   10    ecc focus   Squat taps to chair Elevated x1 foam No UE support   TRX staggered stance squat tap -   2 10 bilat   Lateral/side step up on 6\"  1 15 bilat          Manual Intervention       Patella mobs - inferior, medial/lateral - supine, EOB bilat  3 min Gr. II-III   Knee PROM - supine, EOB bilat  4 min Pushing into flexion range more now. NMR Re-education       Biodex - limits of stability   Medium skill level lvl 9 stability      CC TKE Resistance 3.5 bilat   Mod. BOSU lunge isos   10s 8 bilat   Sport cord march (yellow) Side/side 2 10 Each position;  To begin initiating SLS positions   SLS balance w/SC resistance at knee yellow 20s 5 bilat   Reverse lunge w/slider  2 10 [facility]            Access Code: [email or web address]  URL: [email or web address]  Date: 10/06/2021  Prepared by: Saadia Layton    Exercises    *In addition to pre-op exercises including hamstring stretch, gastroc stretch, heelslides, quad sets, SLR flexion. Therapeutic Exercise and NMR EXR  [x] (26631) Provided verbal/tactile cueing for activities related to strengthening, flexibility, endurance, ROM for improvements in LE, proximal hip, and core control with self care, mobility, lifting, ambulation. [x] (89691) Provided verbal/tactile cueing for activities related to improving balance, coordination, kinesthetic sense, posture, motor skill, proprioception  to assist with LE, proximal hip, and core control in self care, mobility, lifting, ambulation and eccentric single leg control.      NMR and Therapeutic Activities:    [x] (57604 or 60546) Provided verbal/tactile cueing for activities related to improving balance, coordination, kinesthetic sense, posture, motor skill, proprioception and motor activation to allow for proper function of core, proximal hip and LE with self care and ADLs  [x] (40415) Gait Re-education- Provided training and instruction to the patient for proper LE, core and proximal hip recruitment and positioning and eccentric body weight control with ambulation re-education including up and down stairs     Home Exercise Program:    [x] (62616) Reviewed/Progressed HEP activities related to strengthening, flexibility, endurance, ROM of core, proximal hip and LE for functional self-care, mobility, lifting and ambulation/stair navigation   [x] (52614)Reviewed/Progressed HEP activities related to improving balance, coordination, kinesthetic sense, posture, motor skill, proprioception of core, proximal hip and LE for self care, mobility, lifting, and ambulation/stair navigation      Manual Treatments:  PROM / STM / Oscillations-Mobs:  G-I, II, III, IV (PA's, Inf., Post.)  [x] (94626) Provided manual therapy to mobilize LE, proximal hip and/or LS spine soft tissue/joints for the purpose of modulating pain, promoting relaxation,  increasing ROM, reducing/eliminating soft tissue swelling/inflammation/restriction, improving soft tissue extensibility and allowing for proper ROM for normal function with self care, mobility, lifting and ambulation. Modalities:  . Deferred. Will ice at home. Charges:  Timed Code Treatment Minutes: 50   Total Treatment Minutes: 50       [] EVAL (LOW) 43197 (typically 20 minutes face-to-face)   [] EVAL (MOD) 32736 (typically 30 minutes face-to-face)  [] EVAL (HIGH) 10084 (typically 45 minutes face-to-face)  [] RE-EVAL     [x] GJ(35183) x 1    [] IONTO (24425)  [x] NMR (11128) x 1   [] VASO (45834)  [] Manual (52461) x     [] Other:  DN - 1 or 2 muscles (92506)  [x] TA (88684)x 1   [] Mech Traction (67584)  [] ES(attended) (44069)     [] ES (un) (18094):     GOALS:  Patient stated goal: Be able to do normal activities. [x] Progressing: [] Met: [] Not Met: [] Adjusted    Therapist goals for Patient:   Short Term Goals: To be achieved in: 2 weeks  1. Independent in HEP and progression per patient tolerance, in order to prevent re-injury. [] Progressing: [x] Met: [] Not Met: [] Adjusted  2. Patient will have a decrease in pain to facilitate improvement in movement, function, and ADLs as indicated by Functional Deficits. [x] Progressing: [] Met: [] Not Met: [] Adjusted     Long Term Goals: To be achieved in: 8 weeks  1. Disability index score of 20% or less for the LEFS to assist with reaching prior level of function. [x] Progressing: [] Met: [] Not Met: [] Adjusted  Comment:  31% disability on LEFS as of 4/8/22   2. Patient will demonstrate increased AROM to 0-120 to allow for proper joint functioning as indicated by patients Functional Deficits. [] Progressing: [x] Met: [] Not Met: [] Adjusted    3.  Patient will demonstrate an increase in strength to good proximal hip strength and control, within 5lb HHD in LE to allow for proper functional mobility as indicated by patients Functional Deficits. [x] Progressing: [] Met: [] Not Met: [] Adjusted   4. Patient will demonstrate normal gait mechanics without increased symptoms or restriction to allow him to walk and perform all daily mobility. [] Progressing: [x] Met: [] Not Met: [] Adjusted    5. Patient will be able to navigate stairs up/down with reciprocal gait mechanics without increased symptoms or restriction to allow her normal household mobility without restricted access to certain parts of his home. [x] Progressing: [] Met: [] Not Met: [] Adjusted           Progression Towards Functional goals:  [x] Patient is progressing as expected towards functional goals listed. [] Progression is slowed due to complexities listed. [] Progression has been slowed due to co-morbidities. [] Plan just implemented, too soon to assess goals progression  [] Other:     ASSESSMENT:   Continued focus of session on eccentric CKC functional strength progression. Plan on initiating practice getting down on the floor at nv. Encouraged patient to start working on kneeling tolerance by starting some tall kneeling on her bed for 2-3 minutes to decrease knee sensitivity. Return to Play: (if applicable)   []  Stage 1: Intro to Strength   []  Stage 2: Return to Run and Strength   []  Stage 3: Return to Jump and Strength   []  Stage 4: Dynamic Strength and Agility   []  Stage 5: Sport Specific Training     []  Ready to Return to Play, Meets All Above Stages   []  Not Ready for Return to Sports   Comments:            Treatment/Activity Tolerance:  [x] Patient tolerated treatment well [] Patient limited by fatique  [] Patient limited by pain  [] Patient limited by other medical complications  [] Other:     Overall Progression Towards Functional goals/ Treatment Progress Update:  [x] Patient is progressing as expected towards functional goals listed. [] Progression is slowed due to complexities/Impairments listed. [] Progression has been slowed due to co-morbidities. [] Plan just implemented, too soon to assess goals progression <30days   [] Goals require adjustment due to lack of progress  [] Patient is not progressing as expected and requires additional follow up with physician  [] Other    Prognosis for POC: [x] Good [] Fair  [] Poor    Patient requires continued skilled intervention: [x] Yes  [] No        PLAN:   [x] Continue per plan of care [] Alter current plan (see comments)  [] Plan of care initiated [] Hold pending MD visit [] Discharge    Electronically signed by: Clovis Campos, PT, DPT, MS, SCS    Note: If patient does not return for scheduled/recommended follow up visits, this note will serve as a discharge from care along with the most recent update on progress.

## 2022-04-15 ENCOUNTER — HOSPITAL ENCOUNTER (OUTPATIENT)
Dept: PHYSICAL THERAPY | Age: 62
Setting detail: THERAPIES SERIES
Discharge: HOME | End: 2022-04-15
Payer: COMMERCIAL

## 2022-04-15 PROCEDURE — 97110 THERAPEUTIC EXERCISES: CPT

## 2022-04-15 PROCEDURE — 97530 THERAPEUTIC ACTIVITIES: CPT

## 2022-04-15 PROCEDURE — 97112 NEUROMUSCULAR REEDUCATION: CPT

## 2022-04-15 NOTE — FLOWSHEET NOTE
[facility], [place]    Physical Therapy Treatment Note/ Progress Report:     Date:  4/15/2022    Patient Name:  Power Crowe    :  1960  MRN: [id number]  Medical/Treatment Diagnosis Information:  · Diagnosis: M23.306 Other meniscus derangements, unspecified meniscus, R knee; M25.561 R knee pain  · Treatment Diagnosis: M23.306 Other meniscus derangements, unspecified meniscus, R knee; M25.561 R knee pain  Insurance/Certification information:  PT Insurance Information: [facility]  Physician Information:  Referring Practitioner: Edson Donahue  Plan of care signed (Y/N):     Date of Patient follow up with Physician:      Progress Report: []  Yes  [x]  No     Functional Scale: 31% disability - LEFS (55/80)   Date: 22    Date Range for reporting period:  Beginnin21  Endin days or 10 visits    Progress report due (10 Rx/or 30 days whichever is less): 27    Recertification due (POC duration/ or 90 days whichever is less): 22    Visit # Insurance Allowable Auth Needed   64  (30 post-op for L knee) [facility] []Yes    [x]No     Pain level:  0/10    SUBJECTIVE:  Sees Dr. April Leahy for follow up next Wednesday. OBJECTIVE: 22   Observation:    Test measurements:  127 degrees L knee flexion after stretching     HIP Abd      HIP Ext       HIP IR       HIP ER       Knee ext 0 0   Knee Flex 123 125   Ankle PF       Ankle DF       Ankle In       Ankle Ev       Strength  LEFT RIGHT   HIP Flexors  23.8# 25.0#   HIP Abductors 11.8# 14.0#   HIP Ext       Hip ER       Knee EXT (quad) 43.2#  47.4#   Knee Flex (HS) 28.6# 35.9#   Ankle DF       Ankle PF       Ankle Inv       Ankle EV               Circumference  Mid apex  7 cm prox       46.0 cm  51.0 cm     45.5 cm  51.0 cm     Gait:  Ambulating without AD; normal mechanics. RESTRICTIONS/PRECAUTIONS: High BP. Previous tobacco use (5 per day). R knee scope, meniscus repair on 21.   L knee scope, meniscus repair on 12/17/21. Exercises/Interventions:     Therapeutic Exercise Resistance Sets/sec Reps Notes   Recumbent bike 3  10 min    Soleus stretch on SB  30s 5    Hamstring stretch Long sitting 30s 5    Ankle pumps HEP   Heelslides w/strap Supine Pushing into the range more, bilat. Quad sets     SAQ over bolster    LAQ @EOB 0-90 Performed with BFR. See below. SLR flexion  Performed with BFR. See below. SLR abduction  Performed with BFR. See below. SLR adduction 2#    [email or web address] LOP (160 mmHg) Blue cuff 30-15-15-15  SLR flexion/sidelying hip abd/LAQ/standing hamstring curls    **Performed at end of session starting 2/14          Leg press -SL 70#  2 10 bilat   Quad extension - Cybex - SL  Active motion 5# on R  10# on L 1  1   15  15   45 deg  60 deg   Hamstring curls - Cybex - SL 15# 1 10 bilat   Minisquat with heel raise - soleus bias  1 15 bilat   Seated soleus raises - 15# KB (blue) - toes on 1/2 bolster  2 10 bilat                          Therapeutic Activities       Step ups on 8\"/ecc downs  bilat 2   10    ecc focus   Squat taps to chair Elevated x1 foam No UE support   TRX staggered stance squat tap -   2 10 bilat   Lateral/side step up on 6\"  1 15 bilat   Split stance lunges to x2 foam - UE support   2 5-8 Bilat; To work on strength and confidence with getting up from floor                 Manual Intervention       Patella mobs - inferior, medial/lateral - supine, EOB bilat  3 min Gr. II-III   Knee PROM - supine, EOB bilat  4 min Pushing into flexion range more now. NMR Re-education       Biodex - limits of stability   Medium skill level lvl 9 stability      CC TKE Resistance 3.5 bilat   Mod. BOSU lunge isos   10s 8 bilat   Sport cord march (yellow) Side/side 2 10 Each position;  To begin initiating SLS positions   SLS balance w/SC resistance at knee yellow 20s 5 bilat   Reverse lunge w/slider  2 10 bilat            Access Code: [email or web address]  URL: [email or web address]  Date: 10/06/2021  Prepared by: Shala Shanks    Exercises    *In addition to pre-op exercises including hamstring stretch, gastroc stretch, heelslides, quad sets, SLR flexion. Therapeutic Exercise and NMR EXR  [x] (01713) Provided verbal/tactile cueing for activities related to strengthening, flexibility, endurance, ROM for improvements in LE, proximal hip, and core control with self care, mobility, lifting, ambulation. [x] (14859) Provided verbal/tactile cueing for activities related to improving balance, coordination, kinesthetic sense, posture, motor skill, proprioception  to assist with LE, proximal hip, and core control in self care, mobility, lifting, ambulation and eccentric single leg control.      NMR and Therapeutic Activities:    [x] (05518 or 59323) Provided verbal/tactile cueing for activities related to improving balance, coordination, kinesthetic sense, posture, motor skill, proprioception and motor activation to allow for proper function of core, proximal hip and LE with self care and ADLs  [x] (09176) Gait Re-education- Provided training and instruction to the patient for proper LE, core and proximal hip recruitment and positioning and eccentric body weight control with ambulation re-education including up and down stairs     Home Exercise Program:    [x] (17894) Reviewed/Progressed HEP activities related to strengthening, flexibility, endurance, ROM of core, proximal hip and LE for functional self-care, mobility, lifting and ambulation/stair navigation   [x] (57640)Reviewed/Progressed HEP activities related to improving balance, coordination, kinesthetic sense, posture, motor skill, proprioception of core, proximal hip and LE for self care, mobility, lifting, and ambulation/stair navigation      Manual Treatments:  PROM / STM / Oscillations-Mobs:  G-I, II, III, IV (PA's, Inf., Post.)  [x] (75877) Provided manual therapy to mobilize LE, proximal hip and/or LS spine soft tissue/joints for the purpose of modulating pain, promoting relaxation,  increasing ROM, reducing/eliminating soft tissue swelling/inflammation/restriction, improving soft tissue extensibility and allowing for proper ROM for normal function with self care, mobility, lifting and ambulation. Modalities:  . Deferred. Will ice at home. Charges:  Timed Code Treatment Minutes: 50   Total Treatment Minutes: 50       [] EVAL (LOW) 82038 (typically 20 minutes face-to-face)   [] EVAL (MOD) 15294 (typically 30 minutes face-to-face)  [] EVAL (HIGH) 87701 (typically 45 minutes face-to-face)  [] RE-EVAL     [x] ZO(31030) x 1    [] IONTO (96861)  [x] NMR (66775) x 1   [] VASO (16747)  [] Manual (41366) x     [] Other:  DN - 1 or 2 muscles (36776)  [x] TA (03129)x 1   [] Mech Traction (41852)  [] ES(attended) (49298)     [] ES (un) (45092):     GOALS:  Patient stated goal: Be able to do normal activities. [x] Progressing: [] Met: [] Not Met: [] Adjusted    Therapist goals for Patient:   Short Term Goals: To be achieved in: 2 weeks  1. Independent in HEP and progression per patient tolerance, in order to prevent re-injury. [] Progressing: [x] Met: [] Not Met: [] Adjusted  2. Patient will have a decrease in pain to facilitate improvement in movement, function, and ADLs as indicated by Functional Deficits. [x] Progressing: [] Met: [] Not Met: [] Adjusted     Long Term Goals: To be achieved in: 8 weeks  1. Disability index score of 20% or less for the LEFS to assist with reaching prior level of function. [x] Progressing: [] Met: [] Not Met: [] Adjusted  Comment:  31% disability on LEFS as of 4/8/22   2. Patient will demonstrate increased AROM to 0-120 to allow for proper joint functioning as indicated by patients Functional Deficits. [] Progressing: [x] Met: [] Not Met: [] Adjusted    3.  Patient will demonstrate an increase in strength to good proximal hip strength and control, within 5lb HHD in LE to allow for proper functional mobility as indicated by patients Functional Deficits. [x] Progressing: [] Met: [] Not Met: [] Adjusted   4. Patient will demonstrate normal gait mechanics without increased symptoms or restriction to allow him to walk and perform all daily mobility. [] Progressing: [x] Met: [] Not Met: [] Adjusted    5. Patient will be able to navigate stairs up/down with reciprocal gait mechanics without increased symptoms or restriction to allow her normal household mobility without restricted access to certain parts of his home. [x] Progressing: [] Met: [] Not Met: [] Adjusted           Progression Towards Functional goals:  [x] Patient is progressing as expected towards functional goals listed. [] Progression is slowed due to complexities listed. [] Progression has been slowed due to co-morbidities. [] Plan just implemented, too soon to assess goals progression  [] Other:     ASSESSMENT:   Patient reports that she did start working on kneeling tolerance on her own, but she was unable to tolerate tall kneeling on her bed, so she started with quadruped kneeling for 2-3 minutes instead. Continued focus of session on eccentric CKC functional strength progression. Initiated practice getting down on/off the floor with modified height and UE support to build strength and confidence in task performance. Sees Dr. Terra Santamaria for follow up next Wednesday.      Return to Play: (if applicable)   []  Stage 1: Intro to Strength   []  Stage 2: Return to Run and Strength   []  Stage 3: Return to Jump and Strength   []  Stage 4: Dynamic Strength and Agility   []  Stage 5: Sport Specific Training     []  Ready to Return to Play, Meets All Above Stages   []  Not Ready for Return to Sports   Comments:            Treatment/Activity Tolerance:  [x] Patient tolerated treatment well [] Patient limited by fatique  [] Patient limited by pain  [] Patient limited by other medical complications  [] Other:     Overall Progression Towards Functional goals/ Treatment Progress Update:  [x] Patient is progressing as expected towards functional goals listed. [] Progression is slowed due to complexities/Impairments listed. [] Progression has been slowed due to co-morbidities. [] Plan just implemented, too soon to assess goals progression <30days   [] Goals require adjustment due to lack of progress  [] Patient is not progressing as expected and requires additional follow up with physician  [] Other    Prognosis for POC: [x] Good [] Fair  [] Poor    Patient requires continued skilled intervention: [x] Yes  [] No        PLAN:   [x] Continue per plan of care [] Alter current plan (see comments)  [] Plan of care initiated [] Hold pending MD visit [] Discharge    Electronically signed by: Amee Delgado, PT, DPT, MS, SCS    Note: If patient does not return for scheduled/recommended follow up visits, this note will serve as a discharge from care along with the most recent update on progress.

## 2022-04-20 ENCOUNTER — OFFICE VISIT (OUTPATIENT)
Dept: ORTHOPEDIC SURGERY | Age: 62
End: 2022-04-20
Payer: COMMERCIAL

## 2022-04-20 ENCOUNTER — HOSPITAL ENCOUNTER (OUTPATIENT)
Dept: PHYSICAL THERAPY | Age: 62
Setting detail: THERAPIES SERIES
Discharge: HOME | End: 2022-04-20
Payer: COMMERCIAL

## 2022-04-20 VITALS — WEIGHT: 245 LBS | HEIGHT: 69 IN | BODY MASS INDEX: 36.29 KG/M2

## 2022-04-20 DIAGNOSIS — M25.562 LEFT KNEE PAIN, UNSPECIFIED CHRONICITY: Primary | ICD-10-CM

## 2022-04-20 PROCEDURE — 1036F TOBACCO NON-USER: CPT | Performed by: ORTHOPAEDIC SURGERY

## 2022-04-20 PROCEDURE — 99213 OFFICE O/P EST LOW 20 MIN: CPT | Performed by: ORTHOPAEDIC SURGERY

## 2022-04-20 PROCEDURE — 97112 NEUROMUSCULAR REEDUCATION: CPT

## 2022-04-20 PROCEDURE — 3017F COLORECTAL CA SCREEN DOC REV: CPT | Performed by: ORTHOPAEDIC SURGERY

## 2022-04-20 PROCEDURE — 97530 THERAPEUTIC ACTIVITIES: CPT

## 2022-04-20 PROCEDURE — G8417 CALC BMI ABV UP PARAM F/U: HCPCS | Performed by: ORTHOPAEDIC SURGERY

## 2022-04-20 PROCEDURE — G8427 DOCREV CUR MEDS BY ELIG CLIN: HCPCS | Performed by: ORTHOPAEDIC SURGERY

## 2022-04-20 PROCEDURE — 97110 THERAPEUTIC EXERCISES: CPT

## 2022-04-20 NOTE — FLOWSHEET NOTE
[facility], [place]    Physical Therapy Treatment Note/ Progress Report:     Date:  2022    Patient Name:  Mason Ellis    :  1960  MRN: [id number]  Medical/Treatment Diagnosis Information:  · Diagnosis: M23.306 Other meniscus derangements, unspecified meniscus, R knee; M25.561 R knee pain  · Treatment Diagnosis: M23.306 Other meniscus derangements, unspecified meniscus, R knee; M25.561 R knee pain  Insurance/Certification information:  PT Insurance Information: [facility]  Physician Information:  Referring Practitioner: Carlos Moore  Plan of care signed (Y/N):     Date of Patient follow up with Physician:      Progress Report: []  Yes  [x]  No     Functional Scale: 31% disability - LEFS (55/80)   Date: 22    Date Range for reporting period:  Beginnin21  Endin days or 10 visits    Progress report due (10 Rx/or 30 days whichever is less): 3/7/68    Recertification due (POC duration/ or 90 days whichever is less): 22    Visit # Insurance Allowable Auth Needed   58  (31 post-op for L knee) [facility] []Yes    [x]No     Pain level:  0/10    SUBJECTIVE:  Saw Dr. Colonel Rogers for follow up prior to today's PT session. Dr. Colonel Rogers happy with patient's progress.  Stated that he typically encourages PT for 4-5 months minimum following a meniscal root repair, however, he would leave it up to patient and PT to determine continued need for PT.     OBJECTIVE: 22   Observation:    Test measurements:  127 degrees L knee flexion after stretching     HIP Abd      HIP Ext       HIP IR       HIP ER       Knee ext 0 0   Knee Flex 123 125   Ankle PF       Ankle DF       Ankle In       Ankle Ev       Strength  LEFT RIGHT   HIP Flexors  23.8# 25.0#   HIP Abductors 11.8# 14.0#   HIP Ext       Hip ER       Knee EXT (quad) 43.2#  47.4#   Knee Flex (HS) 28.6# 35.9#   Ankle DF       Ankle PF       Ankle Inv       Ankle EV               Circumference  Mid apex  7 cm TKE Resistance 3.5 bilat   Mod. BOSU lunge isos   10s 8 bilat   Sport cord march (yellow) Side/side 2 10 Each position; To begin initiating SLS positions   SLS balance w/SC resistance at knee yellow 20s 5 bilat   Reverse lunge w/slider  bilat            Access Code: [email or web address]  URL: [email or web address]  Date: 10/06/2021  Prepared by: Clark Smith    Exercises    *In addition to pre-op exercises including hamstring stretch, gastroc stretch, heelslides, quad sets, SLR flexion. Therapeutic Exercise and NMR EXR  [x] (33226) Provided verbal/tactile cueing for activities related to strengthening, flexibility, endurance, ROM for improvements in LE, proximal hip, and core control with self care, mobility, lifting, ambulation. [x] (89983) Provided verbal/tactile cueing for activities related to improving balance, coordination, kinesthetic sense, posture, motor skill, proprioception  to assist with LE, proximal hip, and core control in self care, mobility, lifting, ambulation and eccentric single leg control.      NMR and Therapeutic Activities:    [x] (00644 or 23711) Provided verbal/tactile cueing for activities related to improving balance, coordination, kinesthetic sense, posture, motor skill, proprioception and motor activation to allow for proper function of core, proximal hip and LE with self care and ADLs  [x] (18764) Gait Re-education- Provided training and instruction to the patient for proper LE, core and proximal hip recruitment and positioning and eccentric body weight control with ambulation re-education including up and down stairs     Home Exercise Program:    [x] (45095) Reviewed/Progressed HEP activities related to strengthening, flexibility, endurance, ROM of core, proximal hip and LE for functional self-care, mobility, lifting and ambulation/stair navigation   [x] (57255)Reviewed/Progressed HEP activities related to improving balance, coordination, kinesthetic sense, posture, motor skill, proprioception of core, proximal hip and LE for self care, mobility, lifting, and ambulation/stair navigation      Manual Treatments:  PROM / STM / Oscillations-Mobs:  G-I, II, III, IV (PA's, Inf., Post.)  [x] (65157) Provided manual therapy to mobilize LE, proximal hip and/or LS spine soft tissue/joints for the purpose of modulating pain, promoting relaxation,  increasing ROM, reducing/eliminating soft tissue swelling/inflammation/restriction, improving soft tissue extensibility and allowing for proper ROM for normal function with self care, mobility, lifting and ambulation. Modalities:  . Deferred. Will ice at home. Charges:  Timed Code Treatment Minutes: 60   Total Treatment Minutes: 60       [] EVAL (LOW) 72870 (typically 20 minutes face-to-face)   [] EVAL (MOD) 46418 (typically 30 minutes face-to-face)  [] EVAL (HIGH) 09977 (typically 45 minutes face-to-face)  [] RE-EVAL     [x] BX(77927) x 2    [] IONTO (89970)  [x] NMR (60272) x 1   [] VASO (79042)  [] Manual (96134) x     [] Other:  DN - 1 or 2 muscles (38345)  [x] TA (64742)x 1   [] Mech Traction (65277)  [] ES(attended) (71124)     [] ES (un) (47041):     GOALS:  Patient stated goal: Be able to do normal activities. [x] Progressing: [] Met: [] Not Met: [] Adjusted    Therapist goals for Patient:   Short Term Goals: To be achieved in: 2 weeks  1. Independent in HEP and progression per patient tolerance, in order to prevent re-injury. [] Progressing: [x] Met: [] Not Met: [] Adjusted  2. Patient will have a decrease in pain to facilitate improvement in movement, function, and ADLs as indicated by Functional Deficits. [x] Progressing: [] Met: [] Not Met: [] Adjusted     Long Term Goals: To be achieved in: 8 weeks  1. Disability index score of 20% or less for the LEFS to assist with reaching prior level of function. [x] Progressing: [] Met: [] Not Met: [] Adjusted  Comment:  31% disability on LEFS as of 4/8/22   2.  Patient will demonstrate increased AROM to 0-120 to allow for proper joint functioning as indicated by patients Functional Deficits. [] Progressing: [x] Met: [] Not Met: [] Adjusted    3. Patient will demonstrate an increase in strength to good proximal hip strength and control, within 5lb HHD in LE to allow for proper functional mobility as indicated by patients Functional Deficits. [x] Progressing: [] Met: [] Not Met: [] Adjusted   4. Patient will demonstrate normal gait mechanics without increased symptoms or restriction to allow him to walk and perform all daily mobility. [] Progressing: [x] Met: [] Not Met: [] Adjusted    5. Patient will be able to navigate stairs up/down with reciprocal gait mechanics without increased symptoms or restriction to allow her normal household mobility without restricted access to certain parts of his home. [x] Progressing: [] Met: [] Not Met: [] Adjusted           Progression Towards Functional goals:  [x] Patient is progressing as expected towards functional goals listed. [] Progression is slowed due to complexities listed. [] Progression has been slowed due to co-morbidities. [] Plan just implemented, too soon to assess goals progression  [] Other:     ASSESSMENT:   Reports she is now tolerating tall kneeling on her bed for 5 minutes. Notes that the R knee is definitely more sensitive to the pressure on the front of the knee than the L. Continued focus of session on functional strength progression. Saw Dr. Warden Montiel for follow up prior to today's PT session. Dr. Warden Montiel happy with patient's progress. Stated that he typically encourages PT for 4-5 months minimum following a meniscal root repair, however, he would leave it up to patient and PT to determine continued need for PT.  Do think patient is doing well enough to decrease frequency to 1x per week at this time, especially because patient is consistently going to the rec center/gym in [facility] to work out and exercise on her own in addition to performing [employer] exercises provided by her health insurance company daily. Return to Play: (if applicable)   []  Stage 1: Intro to Strength   []  Stage 2: Return to Run and Strength   []  Stage 3: Return to Jump and Strength   []  Stage 4: Dynamic Strength and Agility   []  Stage 5: Sport Specific Training     []  Ready to Return to Play, Meets All Above Stages   []  Not Ready for Return to Sports   Comments:            Treatment/Activity Tolerance:  [x] Patient tolerated treatment well [] Patient limited by fatique  [] Patient limited by pain  [] Patient limited by other medical complications  [] Other:     Overall Progression Towards Functional goals/ Treatment Progress Update:  [x] Patient is progressing as expected towards functional goals listed. [] Progression is slowed due to complexities/Impairments listed. [] Progression has been slowed due to co-morbidities. [] Plan just implemented, too soon to assess goals progression <30days   [] Goals require adjustment due to lack of progress  [] Patient is not progressing as expected and requires additional follow up with physician  [] Other    Prognosis for POC: [x] Good [] Fair  [] Poor    Patient requires continued skilled intervention: [x] Yes  [] No        PLAN: 1x per week for 4 weeks. [] Continue per plan of care [x] Alter current plan (see comments)  [] Plan of care initiated [] Hold pending MD visit [] Discharge    Electronically signed by: Deborah Moura PT, DPT, MS, SCS    Note: If patient does not return for scheduled/recommended follow up visits, this note will serve as a discharge from care along with the most recent update on progress.

## 2022-04-21 NOTE — PROGRESS NOTES
4/20/2022     Interval History:  Status post right posterior medial meniscus root repair on 9/17/2021  Status post left posterior medial meniscus root repair on 12/17/21    The patient returns for postop evaluation. Overall she is doing well and bearing full weight without assistive devices. She is getting physical therapy. She reports no pain and is happy with her progress. Objective:  Ht 5' 9\" (1.753 m)   Wt 245 lb (111.1 kg)   BMI 36.18 kg/m²      Physical Exam:  Examination of the left knee   There is no effusion, no gross deformity or skin changes  Range of motion reveals 0 to 130  neg lachman, negative posterior drawer, no pain or laxity with varus or valgus stress at 0 degrees and 30 degrees of flexion  + medial joint line tenderness  5 out of 5 strength throughout distal muscle groups  Sensation is intact to light touch throughout all distributions  There is no calf swelling or tenderness  Palpable DP pulse, brisk cap refill, 2+ symmetric reflexes    Imaging:  AP and lateral x-ray of the left knee obtained in the office today on 12/29/2021 were reviewed. Postoperative changes consistent with meniscus root repair with cortical button along the lateral proximal tibia. There is no fracture or dislocation. No other abnormality. Assessment:  Status post right posterior medial meniscus root repair on 9/17/2021  Status post left posterior medial meniscus root repair on 12/17/21    Plan:  Overall the patient is doing well. She will gradually continue to increase her activity level. Return to see me in 2 months for 1 last time. Greater than 20 minutes were spent with this encounter. Time spent included evaluating the patient's chart prior to arrival.  Evaluating the patient in the office including history, physical examination, imaging reviewing, and counseling on next steps.   Lastly, time was spent discussing orders with my staff as well as providing documentation in the chart.    Disclaimer: This note was dictated with voice recognition software. Though review and correction are routine, we apologize for any errors.

## 2022-04-22 ENCOUNTER — HOSPITAL ENCOUNTER (OUTPATIENT)
Dept: PHYSICAL THERAPY | Age: 62
Setting detail: THERAPIES SERIES
Discharge: HOME | End: 2022-04-22
Payer: COMMERCIAL

## 2022-04-22 PROCEDURE — 97112 NEUROMUSCULAR REEDUCATION: CPT

## 2022-04-22 PROCEDURE — 97110 THERAPEUTIC EXERCISES: CPT

## 2022-04-22 PROCEDURE — 97530 THERAPEUTIC ACTIVITIES: CPT

## 2022-04-22 NOTE — FLOWSHEET NOTE
[facility]    Physical Therapy Treatment Note/ Progress Report:     Date:  2022    Patient Name:  Jorge Jolley    :  1960  MRN: [id number]  Medical/Treatment Diagnosis Information:  · Diagnosis: M23.306 Other meniscus derangements, unspecified meniscus, R knee; M25.561 R knee pain  · Treatment Diagnosis: M23.306 Other meniscus derangements, unspecified meniscus, R knee; M25.561 R knee pain  Insurance/Certification information:  PT Insurance Information: [facility]  Physician Information:  Referring Practitioner: Rio Combs  Plan of care signed (Y/N):     Date of Patient follow up with Physician:      Progress Report: []  Yes  [x]  No     Functional Scale: 31% disability - LEFS (55/80)   Date: 22    Date Range for reporting period:  Beginnin21  Endin days or 10 visits    Progress report due (10 Rx/or 30 days whichever is less): 81    Recertification due (POC duration/ or 90 days whichever is less): 22    Visit # Insurance Allowable Auth Needed   63  (32 post-op for L knee) [facility] []Yes    [x]No     Pain level:  0/10    SUBJECTIVE:  Plans on going to the gym and starting to use their weight machines this coming week in addition to swimming 2-3x per week and performing her Hinge program daily.      OBJECTIVE: 22   Observation:    Test measurements:  127 degrees L knee flexion after stretching     HIP Abd      HIP Ext       HIP IR       HIP ER       Knee ext 0 0   Knee Flex 123 125   Ankle PF       Ankle DF       Ankle In       Ankle Ev       Strength  LEFT RIGHT   HIP Flexors  23.8# 25.0#   HIP Abductors 11.8# 14.0#   HIP Ext       Hip ER       Knee EXT (quad) 43.2#  47.4#   Knee Flex (HS) 28.6# 35.9#   Ankle DF       Ankle PF       Ankle Inv       Ankle EV               Circumference  Mid apex  7 cm prox       46.0 cm  51.0 cm     45.5 cm  51.0 cm     Gait:  Ambulating without AD; normal mechanics. RESTRICTIONS/PRECAUTIONS: High BP. Previous tobacco use (5 per day). R knee scope, meniscus repair on 9/17/21. L knee scope, meniscus repair on 12/17/21. Exercises/Interventions:     Therapeutic Exercise Resistance Sets/sec Reps Notes   Recumbent bike 3  10 min    Soleus stretch on SB  30s 5    Hamstring stretch Long sitting 30s 5    Ankle pumps HEP   Heelslides w/strap Supine Pushing into the range more, bilat. Quad sets     SAQ over bolster    LAQ @EOB 0-90 Performed with BFR. See below. SLR flexion  Performed with BFR. See below. SLR abduction  Performed with BFR. See below. SLR adduction 2#    [email or web address] LOP (160 mmHg) Blue cuff 30-15-15-15  SLR flexion/sidelying hip abd/LAQ/standing hamstring curls    **Performed at end of session starting 2/14          Leg press -SL 70#  1 25 bilat   Quad extension - Cybex - SL  Active motion 5# on R  10# on L 1  1   15  15   45 deg  60 deg   Hamstring curls - Cybex - SL 15# 1 15 bilat   Minisquat with heel raise - soleus bias  1 15 bilat   Seated soleus raises - 15# KB (blue) - toes on 1/2 bolster  2 10 bilat   Mod. BOSU lateral lunge - opp limb on 6\" step, weight shift   10s 8 bilat                          Therapeutic Activities       Step ups on 8\"/ecc downs  bilat 2   10    ecc focus   Squat taps to chair Elevated x1 foam No UE support   TRX staggered stance squat tap -   2 10 bilat   Lateral/side step up on 6\"  1 15 bilat   Split stance lunges to x2 foam - UE support   2 5-8 Bilat; To work on strength and confidence with getting up from floor                 Manual Intervention       Patella mobs - inferior, medial/lateral - supine, EOB bilat  3 min Gr. II-III   Knee PROM - supine, EOB bilat  4 min Pushing into flexion range more now. NMR Re-education       Biodex - limits of stability   Medium skill level lvl 9 stability      CC TKE Resistance 3.5 bilat   Mod.  BOSU lunge isos   10s 8 bilat   Sport cord march (yellow) Side/side 2 10 Each position; To begin initiating SLS positions   SLS balance w/SC resistance at knee yellow 20s 5 bilat   Reverse lunge w/slider  bilat            Access Code: [email or web address]  URL: [email or web address]  Date: 10/06/2021  Prepared by: Can Christiansen    Exercises    *In addition to pre-op exercises including hamstring stretch, gastroc stretch, heelslides, quad sets, SLR flexion. Therapeutic Exercise and NMR EXR  [x] (20621) Provided verbal/tactile cueing for activities related to strengthening, flexibility, endurance, ROM for improvements in LE, proximal hip, and core control with self care, mobility, lifting, ambulation. [x] (85434) Provided verbal/tactile cueing for activities related to improving balance, coordination, kinesthetic sense, posture, motor skill, proprioception  to assist with LE, proximal hip, and core control in self care, mobility, lifting, ambulation and eccentric single leg control.      NMR and Therapeutic Activities:    [x] (98507 or 92134) Provided verbal/tactile cueing for activities related to improving balance, coordination, kinesthetic sense, posture, motor skill, proprioception and motor activation to allow for proper function of core, proximal hip and LE with self care and ADLs  [x] (27255) Gait Re-education- Provided training and instruction to the patient for proper LE, core and proximal hip recruitment and positioning and eccentric body weight control with ambulation re-education including up and down stairs     Home Exercise Program:    [x] (05579) Reviewed/Progressed HEP activities related to strengthening, flexibility, endurance, ROM of core, proximal hip and LE for functional self-care, mobility, lifting and ambulation/stair navigation   [x] (96687)Reviewed/Progressed HEP activities related to improving balance, coordination, kinesthetic sense, posture, motor skill, proprioception of core, proximal hip and LE for self care, mobility, lifting, and ambulation/stair navigation      Manual Treatments:  PROM / STM / Oscillations-Mobs:  G-I, II, III, IV (PA's, Inf., Post.)  [x] (92563) Provided manual therapy to mobilize LE, proximal hip and/or LS spine soft tissue/joints for the purpose of modulating pain, promoting relaxation,  increasing ROM, reducing/eliminating soft tissue swelling/inflammation/restriction, improving soft tissue extensibility and allowing for proper ROM for normal function with self care, mobility, lifting and ambulation. Modalities:  . Deferred. Will ice at home. Charges:  Timed Code Treatment Minutes: 60   Total Treatment Minutes: 60       [] EVAL (LOW) 42734 (typically 20 minutes face-to-face)   [] EVAL (MOD) 19882 (typically 30 minutes face-to-face)  [] EVAL (HIGH) 76172 (typically 45 minutes face-to-face)  [] RE-EVAL     [x] ZQ(14057) x 2    [] IONTO (23139)  [x] NMR (86569) x 1   [] VASO (50871)  [] Manual (27667) x     [] Other:  DN - 1 or 2 muscles (42446)  [x] TA (14625)x 1   [] Mech Traction (60015)  [] ES(attended) (93469)     [] ES (un) (16061):     GOALS:  Patient stated goal: Be able to do normal activities. [x] Progressing: [] Met: [] Not Met: [] Adjusted    Therapist goals for Patient:   Short Term Goals: To be achieved in: 2 weeks  1. Independent in HEP and progression per patient tolerance, in order to prevent re-injury. [] Progressing: [x] Met: [] Not Met: [] Adjusted  2. Patient will have a decrease in pain to facilitate improvement in movement, function, and ADLs as indicated by Functional Deficits. [x] Progressing: [] Met: [] Not Met: [] Adjusted     Long Term Goals: To be achieved in: 8 weeks  1. Disability index score of 20% or less for the LEFS to assist with reaching prior level of function. [x] Progressing: [] Met: [] Not Met: [] Adjusted  Comment:  31% disability on LEFS as of 4/8/22   2.  Patient will demonstrate increased AROM to 0-120 to allow for proper joint functioning as indicated by patients Functional Deficits. [] Progressing: [x] Met: [] Not Met: [] Adjusted    3. Patient will demonstrate an increase in strength to good proximal hip strength and control, within 5lb HHD in LE to allow for proper functional mobility as indicated by patients Functional Deficits. [x] Progressing: [] Met: [] Not Met: [] Adjusted   4. Patient will demonstrate normal gait mechanics without increased symptoms or restriction to allow him to walk and perform all daily mobility. [] Progressing: [x] Met: [] Not Met: [] Adjusted    5. Patient will be able to navigate stairs up/down with reciprocal gait mechanics without increased symptoms or restriction to allow her normal household mobility without restricted access to certain parts of his home. [x] Progressing: [] Met: [] Not Met: [] Adjusted           Progression Towards Functional goals:  [x] Patient is progressing as expected towards functional goals listed. [] Progression is slowed due to complexities listed. [] Progression has been slowed due to co-morbidities. [] Plan just implemented, too soon to assess goals progression  [] Other:     ASSESSMENT:   Continued focus of session on functional strength progression. Decrease frequency to 1x per week at this time, especially because patient is consistently going to the rec center/gym in [facility] to work out and exercise on her own in addition to performing Hinge exercises provided by her health insurance company daily.     Return to Play: (if applicable)   []  Stage 1: Intro to Strength   []  Stage 2: Return to Run and Strength   []  Stage 3: Return to Jump and Strength   []  Stage 4: Dynamic Strength and Agility   []  Stage 5: Sport Specific Training     []  Ready to Return to Play, Meets All Above Stages   []  Not Ready for Return to Sports   Comments:            Treatment/Activity Tolerance:  [x] Patient tolerated treatment well [] Patient limited by fatique  [] Patient limited by pain  [] Patient limited by other medical complications  [] Other:     Overall Progression Towards Functional goals/ Treatment Progress Update:  [x] Patient is progressing as expected towards functional goals listed. [] Progression is slowed due to complexities/Impairments listed. [] Progression has been slowed due to co-morbidities. [] Plan just implemented, too soon to assess goals progression <30days   [] Goals require adjustment due to lack of progress  [] Patient is not progressing as expected and requires additional follow up with physician  [] Other    Prognosis for POC: [x] Good [] Fair  [] Poor    Patient requires continued skilled intervention: [x] Yes  [] No        PLAN: 1x per week for 4 weeks. [] Continue per plan of care [x] Alter current plan (see comments)  [] Plan of care initiated [] Hold pending MD visit [] Discharge    Electronically signed by: Deborah Moura, PT, DPT, MS, SCS    Note: If patient does not return for scheduled/recommended follow up visits, this note will serve as a discharge from care along with the most recent update on progress.

## 2022-04-27 ENCOUNTER — HOSPITAL ENCOUNTER (OUTPATIENT)
Dept: PHYSICAL THERAPY | Age: 62
Setting detail: THERAPIES SERIES
Discharge: HOME | End: 2022-04-27
Payer: COMMERCIAL

## 2022-04-27 PROCEDURE — 97530 THERAPEUTIC ACTIVITIES: CPT

## 2022-04-27 PROCEDURE — 97110 THERAPEUTIC EXERCISES: CPT

## 2022-04-27 PROCEDURE — 97112 NEUROMUSCULAR REEDUCATION: CPT

## 2022-04-27 NOTE — FLOWSHEET NOTE
[facility]    Physical Therapy Treatment Note/ Progress Report:     Date:  2022    Patient Name:  Varsha Alan    :  1960  MRN: [id number]  Medical/Treatment Diagnosis Information:  · Diagnosis: M23.306 Other meniscus derangements, unspecified meniscus, R knee; M25.561 R knee pain  · Treatment Diagnosis: M23.306 Other meniscus derangements, unspecified meniscus, R knee; M25.561 R knee pain  Insurance/Certification information:  PT Insurance Information: [facility]  Physician Information:  Referring Practitioner: Ursula Maxwell  Plan of care signed (Y/N):     Date of Patient follow up with Physician:      Progress Report: []  Yes  [x]  No     Functional Scale: 31% disability - LEFS (55/80)   Date: 22    Date Range for reporting period:  Beginnin21  Endin days or 10 visits    Progress report due (10 Rx/or 30 days whichever is less): 33    Recertification due (POC duration/ or 90 days whichever is less): 22    Visit # Insurance Allowable Auth Needed   59  (33 post-op for L knee) [facility] []Yes    [x]No     Pain level:  0/10    SUBJECTIVE:  Has been extremely active over the past week or so. Did a lot of walking around grocery stores shopping on Saturday, still going to the rec center to swim 2-3x per week, went to the rec center to do weight machines for the first time yesterday, and performing Hinge exercises daily as well. Has noticed some intermittent burning and significant fatigue in the hips over the past week or so with all the activity.     OBJECTIVE: 22   Observation:    Test measurements:  127 degrees L knee flexion after stretching     HIP Abd      HIP Ext       HIP IR       HIP ER       Knee ext 0 0   Knee Flex 123 125   Ankle PF       Ankle DF       Ankle In       Ankle Ev       Strength  LEFT RIGHT   HIP Flexors  23.8# 25.0#   HIP Abductors 11.8# 14.0#   HIP Ext       Hip ER       Knee EXT (quad) 43.2#  47.4# Knee Flex (HS) 28.6# 35.9#   Ankle DF       Ankle PF       Ankle Inv       Ankle EV               Circumference  Mid apex  7 cm prox       46.0 cm  51.0 cm     45.5 cm  51.0 cm     Gait:  Ambulating without AD; normal mechanics. RESTRICTIONS/PRECAUTIONS: High BP. Previous tobacco use (5 per day). R knee scope, meniscus repair on 9/17/21. L knee scope, meniscus repair on 12/17/21. Exercises/Interventions:     Therapeutic Exercise Resistance Sets/sec Reps Notes   Recumbent bike 3  10 min    Soleus stretch on SB  30s 5    Hamstring stretch Long sitting 30s 5    Ankle pumps HEP   Heelslides w/strap Supine Pushing into the range more, bilat. Quad sets     SAQ over bolster    LAQ @EOB 0-90 Performed with BFR. See below. SLR flexion  Performed with BFR. See below. SLR abduction  Performed with BFR. See below. SLR adduction 2#    [email or web address] LOP (160 mmHg) Blue cuff 30-15-15-15  SLR flexion/sidelying hip abd/LAQ/standing hamstring curls    **Performed at end of session starting 2/14          Leg press -SL 70#  1 25 bilat   Quad extension - Cybex - SL  Active motion 5# on R  10# on L 1  1   15  15   45 deg  60 deg   Hamstring curls - Cybex - SL 15# 1 15 bilat   Minisquat with heel raise - soleus bias  1 15 bilat   Seated soleus raises - 15# KB (blue) - toes on 1/2 bolster  2 10 bilat   Mod. BOSU lateral lunge - opp limb on 6\" step, weight shift   10s 8 bilat   Clamshells sidelying Orange loop 5s 2x10 bilat   Glut bridge w/alt march 1 10                           Therapeutic Activities       Lateral step ups on 6\"/ecc downs  bilat 1   12   ecc focus   Squat taps to chair Elevated x1 foam No UE support   TRX staggered stance squat tap -   2 10 bilat   Lateral/side step up on 6\"  1 15 bilat   Split stance lunges to x2 foam - UE support   2 5-8 Bilat;  To work on strength and confidence with getting up from floor                 Manual Intervention       Patella mobs - inferior, medial/lateral - supine, EOB bilat  3 min Gr. II-III   Knee PROM - supine, EOB bilat  4 min Pushing into flexion range more now. NMR Re-education       Biodex - limits of stability   Medium skill level lvl 9 stability      CC TKE Resistance 3.5 bilat   Mod. BOSU lunge isos   10s 8 bilat   Sport cord march (yellow) Side/side 2 10 Each position; To begin initiating SLS positions   SLS balance w/SC resistance at knee yellow 20s 5 bilat   Reverse lunge w/slider  bilat   Sport cord fwd/bwd walkouts - ecc focus Green cord  5x Each direction     Access Code: [email or web address]  URL: [email or web address]  Date: 10/06/2021  Prepared by: Clark Smith    Exercises    *In addition to pre-op exercises including hamstring stretch, gastroc stretch, heelslides, quad sets, SLR flexion. Therapeutic Exercise and NMR EXR  [x] (56563) Provided verbal/tactile cueing for activities related to strengthening, flexibility, endurance, ROM for improvements in LE, proximal hip, and core control with self care, mobility, lifting, ambulation. [x] (64227) Provided verbal/tactile cueing for activities related to improving balance, coordination, kinesthetic sense, posture, motor skill, proprioception  to assist with LE, proximal hip, and core control in self care, mobility, lifting, ambulation and eccentric single leg control.      NMR and Therapeutic Activities:    [x] (02255 or 34881) Provided verbal/tactile cueing for activities related to improving balance, coordination, kinesthetic sense, posture, motor skill, proprioception and motor activation to allow for proper function of core, proximal hip and LE with self care and ADLs  [x] (82302) Gait Re-education- Provided training and instruction to the patient for proper LE, core and proximal hip recruitment and positioning and eccentric body weight control with ambulation re-education including up and down stairs     Home Exercise Program:    [x] (01191) Reviewed/Progressed HEP activities related to strengthening, flexibility, endurance, ROM of core, proximal hip and LE for functional self-care, mobility, lifting and ambulation/stair navigation   [x] (46996)Reviewed/Progressed HEP activities related to improving balance, coordination, kinesthetic sense, posture, motor skill, proprioception of core, proximal hip and LE for self care, mobility, lifting, and ambulation/stair navigation      Manual Treatments:  PROM / STM / Oscillations-Mobs:  G-I, II, III, IV (PA's, Inf., Post.)  [x] (94866) Provided manual therapy to mobilize LE, proximal hip and/or LS spine soft tissue/joints for the purpose of modulating pain, promoting relaxation,  increasing ROM, reducing/eliminating soft tissue swelling/inflammation/restriction, improving soft tissue extensibility and allowing for proper ROM for normal function with self care, mobility, lifting and ambulation. Modalities:  . Deferred. Will ice at home. Charges:  Timed Code Treatment Minutes: 55   Total Treatment Minutes: 55       [] EVAL (LOW) 26214 (typically 20 minutes face-to-face)   [] EVAL (MOD) 82154 (typically 30 minutes face-to-face)  [] EVAL (HIGH) 37968 (typically 45 minutes face-to-face)  [] RE-EVAL     [x] UA(57641) x 2    [] IONTO (13867)  [x] NMR (43576) x 1   [] VASO (89731)  [] Manual (28544) x     [] Other:  DN - 1 or 2 muscles (35319)  [x] TA (80598)x 1   [] Mech Traction (39011)  [] ES(attended) (71596)     [] ES (un) (35763):     GOALS:  Patient stated goal: Be able to do normal activities. [x] Progressing: [] Met: [] Not Met: [] Adjusted    Therapist goals for Patient:   Short Term Goals: To be achieved in: 2 weeks  1. Independent in HEP and progression per patient tolerance, in order to prevent re-injury. [] Progressing: [x] Met: [] Not Met: [] Adjusted  2. Patient will have a decrease in pain to facilitate improvement in movement, function, and ADLs as indicated by Functional Deficits.   [x] Progressing: [] Met: [] Not Met: [] Adjusted Long Term Goals: To be achieved in: 8 weeks  1. Disability index score of 20% or less for the LEFS to assist with reaching prior level of function. [x] Progressing: [] Met: [] Not Met: [] Adjusted  Comment:  31% disability on LEFS as of 4/8/22   2. Patient will demonstrate increased AROM to 0-120 to allow for proper joint functioning as indicated by patients Functional Deficits. [] Progressing: [x] Met: [] Not Met: [] Adjusted    3. Patient will demonstrate an increase in strength to good proximal hip strength and control, within 5lb HHD in LE to allow for proper functional mobility as indicated by patients Functional Deficits. [x] Progressing: [] Met: [] Not Met: [] Adjusted   4. Patient will demonstrate normal gait mechanics without increased symptoms or restriction to allow him to walk and perform all daily mobility. [] Progressing: [x] Met: [] Not Met: [] Adjusted    5. Patient will be able to navigate stairs up/down with reciprocal gait mechanics without increased symptoms or restriction to allow her normal household mobility without restricted access to certain parts of his home. [x] Progressing: [] Met: [] Not Met: [] Adjusted           Progression Towards Functional goals:  [x] Patient is progressing as expected towards functional goals listed. [] Progression is slowed due to complexities listed. [] Progression has been slowed due to co-morbidities. [] Plan just implemented, too soon to assess goals progression  [] Other:     ASSESSMENT:   Patient's overall activity levels outside of PT progressively increasing. Patient noting more consistent, frequent burning in bilateral hips with increased activity throughout the day. Increased hip strengthening focus today to address patient complaints. Continued functional strength progression as well.      Return to Play: (if applicable)   []  Stage 1: Intro to Strength   []  Stage 2: Return to Run and Strength   []  Stage 3: Return to Jump and Strength   []  Stage 4: Dynamic Strength and Agility   []  Stage 5: Sport Specific Training     []  Ready to Return to Play, Meets All Above Stages   []  Not Ready for Return to Sports   Comments:            Treatment/Activity Tolerance:  [x] Patient tolerated treatment well [] Patient limited by fatique  [] Patient limited by pain  [] Patient limited by other medical complications  [] Other:     Overall Progression Towards Functional goals/ Treatment Progress Update:  [x] Patient is progressing as expected towards functional goals listed. [] Progression is slowed due to complexities/Impairments listed. [] Progression has been slowed due to co-morbidities. [] Plan just implemented, too soon to assess goals progression <30days   [] Goals require adjustment due to lack of progress  [] Patient is not progressing as expected and requires additional follow up with physician  [] Other    Prognosis for POC: [x] Good [] Fair  [] Poor    Patient requires continued skilled intervention: [x] Yes  [] No        PLAN: 1x per week for 4 weeks. [] Continue per plan of care [x] Alter current plan (see comments)  [] Plan of care initiated [] Hold pending MD visit [] Discharge    Electronically signed by: Can Christiansen, PT, DPT, MS, SCS    Note: If patient does not return for scheduled/recommended follow up visits, this note will serve as a discharge from care along with the most recent update on progress.

## 2022-05-04 ENCOUNTER — HOSPITAL ENCOUNTER (OUTPATIENT)
Dept: PHYSICAL THERAPY | Age: 62
Setting detail: THERAPIES SERIES
Discharge: HOME | End: 2022-05-04
Payer: COMMERCIAL

## 2022-05-04 PROCEDURE — 97110 THERAPEUTIC EXERCISES: CPT

## 2022-05-04 PROCEDURE — 97112 NEUROMUSCULAR REEDUCATION: CPT

## 2022-05-04 NOTE — FLOWSHEET NOTE
[facility], [place]    Physical Therapy Treatment Note/ Progress Report:     Date:  2022    Patient Name:  David Patel    :  1960  MRN: [id number]  Medical/Treatment Diagnosis Information:  · Diagnosis: M23.306 Other meniscus derangements, unspecified meniscus, R knee; M25.561 R knee pain  · Treatment Diagnosis: M23.306 Other meniscus derangements, unspecified meniscus, R knee; M25.561 R knee pain  Insurance/Certification information:  PT Insurance Information: [facility]  Physician Information:  Referring Practitioner: Jasper Vega  Plan of care signed (Y/N):     Date of Patient follow up with Physician:      Progress Report: []  Yes  [x]  No     Functional Scale: 31% disability - LEFS (55/80)   Date: 22    Date Range for reporting period:  Beginnin21  Endin days or 10 visits    Progress report due (10 Rx/or 30 days whichever is less): 22 Progress note nv. 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 States that she attempted to scoot her chair back yesterday. The chair did not move and she felt a pull/pain on the inside of her L knee. Inner L knee has been a little achy, burning with walking since then.      OBJECTIVE: 22   Observation:    Test measurements:  127 degrees L knee flexion after stretching     HIP Abd      HIP Ext       HIP IR       HIP ER       Knee ext 0 0   Knee Flex 123 125   Ankle PF       Ankle DF       Ankle In       Ankle Ev       Strength  LEFT RIGHT   HIP Flexors  23.8# 25.0#   HIP Abductors 11.8# 14.0#   HIP Ext       Hip ER Knee EXT (quad) 43.2#  47.4#   Knee Flex (HS) 28.6# 35.9#   Ankle DF       Ankle PF       Ankle Inv       Ankle EV               Circumference  Mid apex  7 cm prox       46.0 cm  51.0 cm     45.5 cm  51.0 cm     Gait:  Ambulating without AD; normal mechanics. RESTRICTIONS/PRECAUTIONS: High BP. Previous tobacco use (5 per day). R knee scope, meniscus repair on 9/17/21. L knee scope, meniscus repair on 12/17/21. Exercises/Interventions:     Therapeutic Exercise Resistance Sets/sec Reps Notes   Recumbent bike 3  10 min    Soleus stretch on SB  30s 5    Hamstring stretch Long sitting 30s 5    Ankle pumps HEP   Heelslides w/strap Supine Pushing into the range more, bilat. Quad sets     SAQ over bolster    LAQ @EOB 0-90 Performed with BFR. See below. SLR flexion  Performed with BFR. See below. SLR abduction  Performed with BFR. See below. SLR adduction 2#    [email or web address] LOP (160 mmHg) Blue cuff 30-15-15-15  SLR flexion/sidelying hip abd/LAQ/standing hamstring curls    **Performed at end of session starting 2/14          Leg press -SL 70#  1 25 bilat   Quad extension - Cybex   Isos on 5/4 on L  SL - Active motion   HEAVY  5# on R  10# on L   10s  1  1     10  15  15     45 deg  45 deg  60 deg   Hamstring curls - Cybex - 2 up SL down on 5/4 15# 1 15 bilat   Minisquat with heel raise - soleus bias  1 15 bilat   Seated soleus raises - 15# KB (blue) - toes on 1/2 bolster  2 10 bilat   Mod. BOSU lateral lunge - opp limb on 6\" step, weight shift   10s 8 bilat   Clamshells sidelying Orange loop 5s 2x10 bilat   Glut bridge w/alt march 1 10                           Therapeutic Activities       Lateral step ups on 6\"/ecc downs  bilat 1   12   ecc focus   Squat taps to chair Elevated x1 foam No UE support   TRX staggered stance squat tap -   2 10 bilat   Lateral/side step up on 6\"  1 15 bilat   Split stance lunges to x2 foam - UE support   2 5-8 Bilat;  To work on strength and confidence with getting up from floor Manual Intervention       Patella mobs - inferior, medial/lateral - supine, EOB bilat  3 min Gr. II-III   Knee PROM - supine, EOB bilat  4 min Pushing into flexion range more now. NMR Re-education       Biodex - limits of stability   Medium skill level lvl 9 stability      CC TKE Resistance 3.5 bilat   Mod. BOSU lunge isos   10s 8 bilat   Sport cord march (yellow) Side/side 2 10 Each position; To begin initiating SLS positions   SLS balance w/SC resistance at knee yellow 20s 5 bilat   Reverse lunge w/slider  bilat   Sport cord fwd/bwd walkouts - ecc focus Green cord  5x Each direction     Access Code: [email or web address]  URL: [email or web address]  Date: 10/06/2021  Prepared by: Wylie Najjar    Exercises    *In addition to pre-op exercises including hamstring stretch, gastroc stretch, heelslides, quad sets, SLR flexion. Therapeutic Exercise and NMR EXR  [x] (95375) Provided verbal/tactile cueing for activities related to strengthening, flexibility, endurance, ROM for improvements in LE, proximal hip, and core control with self care, mobility, lifting, ambulation. [x] (66454) Provided verbal/tactile cueing for activities related to improving balance, coordination, kinesthetic sense, posture, motor skill, proprioception  to assist with LE, proximal hip, and core control in self care, mobility, lifting, ambulation and eccentric single leg control.      NMR and Therapeutic Activities:    [x] (95372 or 59932) Provided verbal/tactile cueing for activities related to improving balance, coordination, kinesthetic sense, posture, motor skill, proprioception and motor activation to allow for proper function of core, proximal hip and LE with self care and ADLs  [x] (15416) Gait Re-education- Provided training and instruction to the patient for proper LE, core and proximal hip recruitment and positioning and eccentric body weight control with ambulation re-education including up and down stairs     Home Exercise Program:    [x] (13515) Reviewed/Progressed HEP activities related to strengthening, flexibility, endurance, ROM of core, proximal hip and LE for functional self-care, mobility, lifting and ambulation/stair navigation   [x] (45919)Reviewed/Progressed HEP activities related to improving balance, coordination, kinesthetic sense, posture, motor skill, proprioception of core, proximal hip and LE for self care, mobility, lifting, and ambulation/stair navigation      Manual Treatments:  PROM / STM / Oscillations-Mobs:  G-I, II, III, IV (PA's, Inf., Post.)  [x] (43327) Provided manual therapy to mobilize LE, proximal hip and/or LS spine soft tissue/joints for the purpose of modulating pain, promoting relaxation,  increasing ROM, reducing/eliminating soft tissue swelling/inflammation/restriction, improving soft tissue extensibility and allowing for proper ROM for normal function with self care, mobility, lifting and ambulation. Modalities:  . Deferred. Will ice at home. Charges:  Timed Code Treatment Minutes: 50   Total Treatment Minutes: 50       [] EVAL (LOW) 56003 (typically 20 minutes face-to-face)   [] EVAL (MOD) 26464 (typically 30 minutes face-to-face)  [] EVAL (HIGH) 21133 (typically 45 minutes face-to-face)  [] RE-EVAL     [x] EL(84639) x 2    [] IONTO (34614)  [x] NMR (84256) x 1   [] VASO (94170)  [] Manual (31206) x     [] Other:  DN - 1 or 2 muscles (23771)  [] TA (83199)x    [] Mech Traction (30995)  [] ES(attended) (07346)     [] ES (un) (53663):     GOALS:  Patient stated goal: Be able to do normal activities. [x] Progressing: [] Met: [] Not Met: [] Adjusted    Therapist goals for Patient:   Short Term Goals: To be achieved in: 2 weeks  1. Independent in HEP and progression per patient tolerance, in order to prevent re-injury. [] Progressing: [x] Met: [] Not Met: [] Adjusted  2.  Patient will have a decrease in pain to facilitate improvement in movement, function, and ADLs as indicated by Functional Deficits. [x] Progressing: [] Met: [] Not Met: [] Adjusted     Long Term Goals: To be achieved in: 8 weeks  1. Disability index score of 20% or less for the LEFS to assist with reaching prior level of function. [x] Progressing: [] Met: [] Not Met: [] Adjusted  Comment:  31% disability on LEFS as of 4/8/22   2. Patient will demonstrate increased AROM to 0-120 to allow for proper joint functioning as indicated by patients Functional Deficits. [] Progressing: [x] Met: [] Not Met: [] Adjusted    3. Patient will demonstrate an increase in strength to good proximal hip strength and control, within 5lb HHD in LE to allow for proper functional mobility as indicated by patients Functional Deficits. [x] Progressing: [] Met: [] Not Met: [] Adjusted   4. Patient will demonstrate normal gait mechanics without increased symptoms or restriction to allow him to walk and perform all daily mobility. [] Progressing: [x] Met: [] Not Met: [] Adjusted    5. Patient will be able to navigate stairs up/down with reciprocal gait mechanics without increased symptoms or restriction to allow her normal household mobility without restricted access to certain parts of his home. [x] Progressing: [] Met: [] Not Met: [] Adjusted           Progression Towards Functional goals:  [x] Patient is progressing as expected towards functional goals listed. [] Progression is slowed due to complexities listed. [] Progression has been slowed due to co-morbidities. [] Plan just implemented, too soon to assess goals progression  [] Other:     ASSESSMENT:   Patient reports some lingering achiness, soreness in L anterior medial knee with walking, standing, weight bearing activities after attempting to slide a chair back yesterday while working [employer]. The chair did not slide and patient felt a pull/pain in anterior medial L knee.  No significant tenderness noted in the area of lingering achiness, burning in anterior medial L knee. Denies any discomfort, ttp to distal VMO. No reproduction of discomfort with knee, patella mobs, meniscus testing. Modified session today to address L knee complaints. Reports resolution of pain in L knee with ambulation at session conclusion. Return to Play: (if applicable)   []  Stage 1: Intro to Strength   []  Stage 2: Return to Run and Strength   []  Stage 3: Return to Jump and Strength   []  Stage 4: Dynamic Strength and Agility   []  Stage 5: Sport Specific Training     []  Ready to Return to Play, Meets All Above Stages   []  Not Ready for Return to Sports   Comments:            Treatment/Activity Tolerance:  [x] Patient tolerated treatment well [] Patient limited by fatique  [] Patient limited by pain  [] Patient limited by other medical complications  [] Other:     Overall Progression Towards Functional goals/ Treatment Progress Update:  [x] Patient is progressing as expected towards functional goals listed. [] Progression is slowed due to complexities/Impairments listed. [] Progression has been slowed due to co-morbidities. [] Plan just implemented, too soon to assess goals progression <30days   [] Goals require adjustment due to lack of progress  [] Patient is not progressing as expected and requires additional follow up with physician  [] Other    Prognosis for POC: [x] Good [] Fair  [] Poor    Patient requires continued skilled intervention: [x] Yes  [] No        PLAN: 1x per week for 4 weeks. [x] Continue per plan of care [x] Alter current plan (see comments)  [] Plan of care initiated [] Hold pending MD visit [] Discharge    Electronically signed by: Marbella Kirkland, PT, DPT, MS, SCS    Note: If patient does not return for scheduled/recommended follow up visits, this note will serve as a discharge from care along with the most recent update on progress.

## 2022-05-11 ENCOUNTER — HOSPITAL ENCOUNTER (OUTPATIENT)
Dept: PHYSICAL THERAPY | Age: 62
Setting detail: THERAPIES SERIES
Discharge: HOME | End: 2022-05-11
Payer: COMMERCIAL

## 2022-05-11 PROCEDURE — 97112 NEUROMUSCULAR REEDUCATION: CPT

## 2022-05-11 PROCEDURE — 97110 THERAPEUTIC EXERCISES: CPT

## 2022-05-11 PROCEDURE — 97530 THERAPEUTIC ACTIVITIES: CPT

## 2022-05-11 NOTE — PLAN OF CARE
Ben, 2001 [facility]      Physical Therapy Re-Certification Plan of Care/MD UPDATE      Dear Dr. Carlos Moore,    We had the pleasure of treating the following patient for physical therapy services at [address] [place] [address]. A summary of our findings can be found in the updated assessment below. This includes our plan of care. If you have any questions or concerns regarding these findings, please do not hesitate to contact me at the office phone number checked above. Thank you for the referral.     Physician Signature:________________________________Date:__________________  By signing above (or electronic signature), therapists plan is approved by physician    Date Range Of Visits: 4/8/22 to 5/11/22  Total Visits to Date: 77 (28 post-op for L knee)  Overall Response to Treatment:   [x]Patient is responding well to treatment and improvement is noted with regards  to goals   []Patient should continue to improve in reasonable time if they continue HEP   []Patient has plateaued and is no longer responding to skilled PT intervention    []Patient is getting worse and would benefit from return to referring MD   []Patient unable to adhere to initial POC   [x]Other: Patient is approximately 20 weeks s/p L knee scope with meniscus root repair. She is even further out for a R meniscus root repair. Primary focus of PT the past 4-6 weeks has been on progression on general LE strength and function. Patient is doing very well overall. Reports only 8% disability on updated LEFS, which is clinically significant improvement. Updated strength measures are improved globally compared to previous assessment, but some hip strength deficits still present. She is going up/down stairs with reciprocal mechanics pain free.  Does still have some difficulty going up/down the stairs carrying an object, but this is more related to continued balance difficulties rather than due to difficulty or pain in either knee. Also notes some difficulty with prolonged standing and walking due to hip/low back discomfort likely secondary to continued hip strength/endurance deficits. Patient will continue to benefit from skilled PT to address remaining functional complaints to enable full, safe return to PLOF pain free and without limitations. Physical Therapy Treatment Note/ Progress Report:     Date:  2022    Patient Name:  Berny Brunson    :  1960  MRN: [id number]  Medical/Treatment Diagnosis Information:  · Diagnosis: M23.306 Other meniscus derangements, unspecified meniscus, R knee; M25.561 R knee pain  · Treatment Diagnosis: M23.306 Other meniscus derangements, unspecified meniscus, R knee; M25.561 R knee pain  Insurance/Certification information:  PT Insurance Information: [facility]  Physician Information:  Referring Practitioner: Alexander Tucker  Plan of care signed (Y/N):     Date of Patient follow up with Physician:      Progress Report: [x]  Yes  []  No     Functional Scale: 8% disability - LEFS (59/64)   Date: 22    Date Range for reporting period:  Beginnin21  Endin days or 10 visits    Progress report due (10 Rx/or 30 days whichever is less): 19    Recertification due (POC duration/ or 90 days whichever is less): 22    Visit # Insurance Allowable Auth Needed   66  (35 post-op for L knee) [facility] []Yes    [x]No     Pain level:  0/10    SUBJECTIVE: Reports that she is going up/down the stairs one foot to a step, but does still have difficulty with carrying anything up/down the stairs more due to balance difficulty than difficulty with the stairs themselves. Still has some difficulty with prolonged standing and walking, but this is more due to low back/hip discomfort, not knee discomfort.     OBJECTIVE: 22   Observation:    Test measurements:  127 degrees L knee flexion after stretching     HIP Abd      HIP Ext       HIP IR       HIP ER Knee ext 0 0   Knee Flex 123 125   Ankle PF       Ankle DF       Ankle In       Ankle Ev       Strength  LEFT RIGHT   HIP Flexors  32.2# 31.8#   HIP Abductors 15.8# 15.9#   HIP Ext       Hip ER       Knee EXT (quad) 48.6#  47.4#   Knee Flex (HS) 36.9# 44.0#   Ankle DF       Ankle PF       Ankle Inv       Ankle EV               Circumference  Mid apex  7 cm prox       46.0 cm  51.0 cm     45.5 cm  51.0 cm     Gait:  Ambulating without AD; normal mechanics. RESTRICTIONS/PRECAUTIONS: High BP. Previous tobacco use (5 per day). R knee scope, meniscus repair on 9/17/21. L knee scope, meniscus repair on 12/17/21. Exercises/Interventions:     Therapeutic Exercise Resistance Sets/sec Reps Notes   Recumbent bike 5  10 min    Soleus stretch on SB  30s 5    Hamstring stretch Long sitting 30s 5    Ankle pumps HEP   Heelslides w/strap Supine Pushing into the range more, bilat. Quad sets     SAQ over bolster    LAQ @EOB 0-90 Performed with BFR. See below. SLR flexion  Performed with BFR. See below. SLR abduction  Performed with BFR. See below. SLR adduction 2#    [email or web address] LOP (160 mmHg) Blue cuff 30-15-15-15  SLR flexion/sidelying hip abd/LAQ/standing hamstring curls    **Performed at end of session starting 2/14          Leg press -SL 70#  1 25 bilat   Quad extension - SL - Active motion 10#   1  1   15  15     45 deg  45 deg  60 deg   Hamstring curls - Cybex - SL 15# 1 15 bilat   Minisquat with heel raise - soleus bias  1 15 bilat   Seated soleus raises - 15# KB (blue) - toes on 1/2 bolster  2 10 bilat   Mod.  BOSU lateral lunge - opp limb on 6\" step, weight shift   10s 8 bilat   Clamshells sidelying Orange loop 5s 2x10 bilat   Glut bridge w/alt march 1 10                           Therapeutic Activities       Lateral step ups on 6\"/ecc downs  bilat 1   12   ecc focus   Squat taps to chair Elevated x1 foam No UE support   Step ups/overs on 8\" - No UE support  1 15 bilat   TRX staggered stance squat tap -  Single arm TRX support 2 10 bilat   Lateral/side step up on 6\"  1 15 bilat   Split stance lunges to x2 foam - UE support   2 5-8 Bilat; To work on strength and confidence with getting up from floor                 Manual Intervention       Patella mobs - inferior, medial/lateral - supine, EOB bilat  3 min Gr. II-III   Knee PROM - supine, EOB bilat  4 min Pushing into flexion range more now. NMR Re-education       Biodex - limits of stability   Medium skill level lvl 9 stability      CC TKE Resistance 3.5 bilat   Mod. BOSU lunge isos   10s 8 bilat   Sport cord march (yellow) Side/side 2 10 Each position; To begin initiating SLS positions   SLS balance w/SC resistance at knee yellow 20s 5 bilat   Reverse, diagonal lunge w/slider  1 12 bilat   Sport cord fwd/bwd, side/side walkouts - ecc focus 2 yellow cords  5x Each direction     Access Code: [email or web address]  URL: [email or web address]  Date: 10/06/2021  Prepared by: Rhianna Li    Exercises    *In addition to pre-op exercises including hamstring stretch, gastroc stretch, heelslides, quad sets, SLR flexion. Therapeutic Exercise and NMR EXR  [x] (24834) Provided verbal/tactile cueing for activities related to strengthening, flexibility, endurance, ROM for improvements in LE, proximal hip, and core control with self care, mobility, lifting, ambulation. [x] (32784) Provided verbal/tactile cueing for activities related to improving balance, coordination, kinesthetic sense, posture, motor skill, proprioception  to assist with LE, proximal hip, and core control in self care, mobility, lifting, ambulation and eccentric single leg control.      NMR and Therapeutic Activities:    [x] (14356 or 03054) Provided verbal/tactile cueing for activities related to improving balance, coordination, kinesthetic sense, posture, motor skill, proprioception and motor activation to allow for proper function of core, proximal hip and LE with self care and ADLs  [x] (95152) Gait Re-education- Provided training and instruction to the patient for proper LE, core and proximal hip recruitment and positioning and eccentric body weight control with ambulation re-education including up and down stairs     Home Exercise Program:    [x] (43663) Reviewed/Progressed HEP activities related to strengthening, flexibility, endurance, ROM of core, proximal hip and LE for functional self-care, mobility, lifting and ambulation/stair navigation   [x] (61220)Reviewed/Progressed HEP activities related to improving balance, coordination, kinesthetic sense, posture, motor skill, proprioception of core, proximal hip and LE for self care, mobility, lifting, and ambulation/stair navigation      Manual Treatments:  PROM / STM / Oscillations-Mobs:  G-I, II, III, IV (PA's, Inf., Post.)  [x] (92041) Provided manual therapy to mobilize LE, proximal hip and/or LS spine soft tissue/joints for the purpose of modulating pain, promoting relaxation,  increasing ROM, reducing/eliminating soft tissue swelling/inflammation/restriction, improving soft tissue extensibility and allowing for proper ROM for normal function with self care, mobility, lifting and ambulation. Modalities:  . Deferred. Will ice at home. Charges:  Timed Code Treatment Minutes: 60   Total Treatment Minutes: 60       [] EVAL (LOW) 03889 (typically 20 minutes face-to-face)   [] EVAL (MOD) 70803 (typically 30 minutes face-to-face)  [] EVAL (HIGH) 88042 (typically 45 minutes face-to-face)  [] RE-EVAL     [x] BZ(74750) x 2    [] IONTO (95774)  [x] NMR (28386) x 1   [] VASO (30216)  [] Manual (61845) x     [] Other:  DN - 1 or 2 muscles (78476)  [x] TA (84383)x 1   [] Mech Traction (39119)  [] ES(attended) (12832)     [] ES (un) (02933):     GOALS:  Patient stated goal: Be able to do normal activities. [x] Progressing: [] Met: [] Not Met: [] Adjusted    Therapist goals for Patient:   Short Term Goals: To be achieved in: 2 weeks  1. Independent in HEP and progression per patient tolerance, in order to prevent re-injury. [] Progressing: [x] Met: [] Not Met: [] Adjusted  2. Patient will have a decrease in pain to facilitate improvement in movement, function, and ADLs as indicated by Functional Deficits. [x] Progressing: [x] Met: [] Not Met: [] Adjusted     Long Term Goals: To be achieved in: 8 weeks  1. Disability index score of 20% or less for the LEFS to assist with reaching prior level of function. [] Progressing: [x] Met: [] Not Met: [] Adjusted  Comment:  8% disability on LEFS as of 5/11/22   2. Patient will demonstrate increased AROM to 0-120 to allow for proper joint functioning as indicated by patients Functional Deficits. [] Progressing: [x] Met: [] Not Met: [] Adjusted    3. Patient will demonstrate an increase in strength to good proximal hip strength and control, within 5lb HHD in LE to allow for proper functional mobility as indicated by patients Functional Deficits. [x] Progressing: [] Met: [] Not Met: [] Adjusted   4. Patient will demonstrate normal gait mechanics without increased symptoms or restriction to allow him to walk and perform all daily mobility. [] Progressing: [x] Met: [] Not Met: [] Adjusted     5. Patient will be able to navigate stairs up/down with reciprocal gait mechanics without increased symptoms or restriction to allow her normal household mobility without restricted access to certain parts of his home. [] Progressing: [x] Met: [] Not Met: [] Adjusted           Progression Towards Functional goals:  [x] Patient is progressing as expected towards functional goals listed. [] Progression is slowed due to complexities listed. [] Progression has been slowed due to co-morbidities. [] Plan just implemented, too soon to assess goals progression  [] Other:     ASSESSMENT:   Patient is approximately 20 weeks s/p L knee scope with meniscus root repair.  She is even further out for a R meniscus root repair. Primary focus of PT the past 4-6 weeks has been on progression on general LE strength and function. Patient is doing very well overall. Reports only 8% disability on updated LEFS, which is clinically significant improvement. Updated strength measures are improved globally compared to previous assessment, but some hip strength deficits still present. She is going up/down stairs with reciprocal mechanics pain free. Does still have some difficulty going up/down the stairs carrying an object, but this is more related to continued balance difficulties rather than due to difficulty or pain in either knee. Also notes some difficulty with prolonged standing and walking due to hip/low back discomfort likely secondary to continued hip strength/endurance deficits. Patient will continue to benefit from skilled PT to address remaining functional complaints to enable full, safe return to PLOF pain free and without limitations. Return to Play: (if applicable)   []  Stage 1: Intro to Strength   []  Stage 2: Return to Run and Strength   []  Stage 3: Return to Jump and Strength   []  Stage 4: Dynamic Strength and Agility   []  Stage 5: Sport Specific Training     []  Ready to Return to Play, Meets All Above Stages   []  Not Ready for Return to Sports   Comments:            Treatment/Activity Tolerance:  [x] Patient tolerated treatment well [] Patient limited by fatique  [] Patient limited by pain  [] Patient limited by other medical complications  [] Other:     Overall Progression Towards Functional goals/ Treatment Progress Update:  [x] Patient is progressing as expected towards functional goals listed. [] Progression is slowed due to complexities/Impairments listed. [] Progression has been slowed due to co-morbidities.   [] Plan just implemented, too soon to assess goals progression <30days   [] Goals require adjustment due to lack of progress  [] Patient is not progressing as expected and requires additional follow up with physician  [] Other    Prognosis for POC: [x] Good [] Fair  [] Poor    Patient requires continued skilled intervention: [x] Yes  [] No        PLAN: 1x per week for 4 weeks. [x] Continue per plan of care [x] Alter current plan (see comments)  [] Plan of care initiated [] Hold pending MD visit [] Discharge    Electronically signed by: Marbella Kirkland, PT, DPT, MS, SCS    Note: If patient does not return for scheduled/recommended follow up visits, this note will serve as a discharge from care along with the most recent update on progress.

## 2022-05-18 ENCOUNTER — HOSPITAL ENCOUNTER (OUTPATIENT)
Dept: PHYSICAL THERAPY | Age: 62
Setting detail: THERAPIES SERIES
Discharge: HOME | End: 2022-05-18
Payer: COMMERCIAL

## 2022-05-18 PROCEDURE — 97112 NEUROMUSCULAR REEDUCATION: CPT

## 2022-05-18 PROCEDURE — 97110 THERAPEUTIC EXERCISES: CPT

## 2022-05-18 PROCEDURE — 97530 THERAPEUTIC ACTIVITIES: CPT

## 2022-05-18 NOTE — FLOWSHEET NOTE
[facility], [place]     Physical Therapy Treatment Note/ Progress Report:     Date:  2022    Patient Name:  Judah Ortega    :  1960  MRN: [id number]  Medical/Treatment Diagnosis Information:  · Diagnosis: M23.306 Other meniscus derangements, unspecified meniscus, R knee; M25.561 R knee pain  · Treatment Diagnosis: M23.306 Other meniscus derangements, unspecified meniscus, R knee; M25.561 R knee pain  Insurance/Certification information:  PT Insurance Information: [facility]  Physician Information:  Referring Practitioner: Doug Villalobos  Plan of care signed (Y/N):     Date of Patient follow up with Physician:      Progress Report: []  Yes  [x]  No     Functional Scale: 8% disability - LEFS (59/64)   Date: 22    Date Range for reporting period:  Beginnin21  Endin days or 10 visits    Progress report due (10 Rx/or 30 days whichever is less):     Recertification due (POC duration/ or 90 days whichever is less): 22    Visit # Insurance Allowable Auth Needed   79  (36 post-op for L knee) [facility] []Yes    [x]No     Pain level:  0/10    SUBJECTIVE: Reports that she is going up/down the stairs one foot to a step, but does still have difficulty with carrying anything up/down the stairs more due to balance difficulty than difficulty with the stairs themselves. Still has some difficulty with prolonged standing and walking, but this is more due to low back/hip discomfort, not knee discomfort.     OBJECTIVE: 22   Observation:    Test measurements:  127 degrees L knee flexion after stretching     HIP Abd      HIP Ext       HIP IR       HIP ER       Knee ext 0 0   Knee Flex 123 125   Ankle PF       Ankle DF       Ankle In       Ankle Ev       Strength  LEFT RIGHT   HIP Flexors  32.2# 31.8#   HIP Abductors 15.8# 15.9#   HIP Ext       Hip ER       Knee EXT (quad) 48.6#  47.4#   Knee Flex (HS) 36.9# 44.0#   Ankle DF       Ankle PF Ankle Inv       Ankle EV               Circumference  Mid apex  7 cm prox       46.0 cm  51.0 cm     45.5 cm  51.0 cm     Gait:  Ambulating without AD; normal mechanics. RESTRICTIONS/PRECAUTIONS: High BP. Previous tobacco use (5 per day). R knee scope, meniscus repair on 9/17/21. L knee scope, meniscus repair on 12/17/21. Exercises/Interventions:     Therapeutic Exercise Resistance Sets/sec Reps Notes   Recumbent bike 5  10 min    Soleus stretch on SB  30s 5    Hamstring stretch Long sitting 30s 5    Ankle pumps HEP   Heelslides w/strap Supine Pushing into the range more, bilat. Quad sets     SAQ over bolster    LAQ @EOB 0-90 Performed with BFR. See below. SLR flexion  Performed with BFR. See below. SLR abduction  Performed with BFR. See below. SLR adduction 2#    [employer] LOP (160 mmHg) Blue cuff 30-15-15-15  SLR flexion/sidelying hip abd/LAQ/standing hamstring curls    **Performed at end of session starting 2/14          Leg press -SL 70#  1 25 bilat   Quad extension - SL - Active motion 10#   1  1   15  15     45 deg  45 deg  60 deg   Hamstring curls - Cybex - SL 15# 1 15 bilat   Minisquat with heel raise - soleus bias  1 15 bilat   Seated soleus raises - 15# KB (blue) - toes on 1/2 bolster  2 10 bilat   Mod. BOSU lateral lunge - opp limb on 6\" step, weight shift   10s 8 bilat   Clamshells sidelying Orange loop 5s 2x10 bilat   Glut bridge w/alt march 1 10                           Therapeutic Activities       Lateral step ups on 6\"/ecc downs  bilat 1   12   ecc focus   Squat taps to chair Elevated x1 foam No UE support   Step ups/overs on 8\" - No UE support  1 15 bilat   TRX staggered stance squat tap -  Single arm TRX support 2 10 bilat   Lateral/side step up on 6\"  1 15 bilat   Split stance lunges to x2 foam - UE support   2 5-8 Bilat;  To work on strength and confidence with getting up from floor                 Manual Intervention       Patella mobs - inferior, medial/lateral - supine, EOB bilat  3 min Gr. II-III   Knee PROM - supine, EOB bilat  4 min Pushing into flexion range more now. NMR Re-education       Biodex - limits of stability   Medium skill level lvl 9 stability      CC TKE Resistance 3.5 bilat   Mod. BOSU lunge isos   10s 8 bilat   Sport cord march (yellow) Side/side 2 10 Each position; To begin initiating SLS positions   SLS balance w/SC resistance at knee yellow 20s 5 bilat   Reverse, diagonal lunge w/slider  1 12 bilat   Sport cord fwd/bwd, side/side walkouts - ecc focus 2 yellow cords  5x Each direction     Access Code: [email or web address]  URL: [email or web address]  Date: 10/06/2021  Prepared by: Timothy Bang    Exercises    *In addition to pre-op exercises including hamstring stretch, gastroc stretch, heelslides, quad sets, SLR flexion. Therapeutic Exercise and NMR EXR  [x] (00027) Provided verbal/tactile cueing for activities related to strengthening, flexibility, endurance, ROM for improvements in LE, proximal hip, and core control with self care, mobility, lifting, ambulation. [x] (58089) Provided verbal/tactile cueing for activities related to improving balance, coordination, kinesthetic sense, posture, motor skill, proprioception  to assist with LE, proximal hip, and core control in self care, mobility, lifting, ambulation and eccentric single leg control.      NMR and Therapeutic Activities:    [x] (57767 or 08791) Provided verbal/tactile cueing for activities related to improving balance, coordination, kinesthetic sense, posture, motor skill, proprioception and motor activation to allow for proper function of core, proximal hip and LE with self care and ADLs  [x] (64638) Gait Re-education- Provided training and instruction to the patient for proper LE, core and proximal hip recruitment and positioning and eccentric body weight control with ambulation re-education including up and down stairs     Home Exercise Program:    [x] (99255) Reviewed/Progressed HEP activities related to strengthening, flexibility, endurance, ROM of core, proximal hip and LE for functional self-care, mobility, lifting and ambulation/stair navigation   [x] (98095)Reviewed/Progressed HEP activities related to improving balance, coordination, kinesthetic sense, posture, motor skill, proprioception of core, proximal hip and LE for self care, mobility, lifting, and ambulation/stair navigation      Manual Treatments:  PROM / STM / Oscillations-Mobs:  G-I, II, III, IV (PA's, Inf., Post.)  [x] (51427) Provided manual therapy to mobilize LE, proximal hip and/or LS spine soft tissue/joints for the purpose of modulating pain, promoting relaxation,  increasing ROM, reducing/eliminating soft tissue swelling/inflammation/restriction, improving soft tissue extensibility and allowing for proper ROM for normal function with self care, mobility, lifting and ambulation. Modalities:  . Deferred. Will ice at home. Charges:  Timed Code Treatment Minutes: 60   Total Treatment Minutes: 60       [] EVAL (LOW) 69799 (typically 20 minutes face-to-face)   [] EVAL (MOD) 73075 (typically 30 minutes face-to-face)  [] EVAL (HIGH) 23336 (typically 45 minutes face-to-face)  [] RE-EVAL     [x] ET(30737) x 2    [] IONTO (66098)  [x] NMR (79692) x 1   [] VASO (71150)  [] Manual (50623) x     [] Other:  DN - 1 or 2 muscles (80049)  [x] TA (24855)x 1   [] Mech Traction (78825)  [] ES(attended) (24440)     [] ES (un) (11290):     GOALS:  Patient stated goal: Be able to do normal activities. [x] Progressing: [] Met: [] Not Met: [] Adjusted    Therapist goals for Patient:   Short Term Goals: To be achieved in: 2 weeks  1. Independent in HEP and progression per patient tolerance, in order to prevent re-injury. [] Progressing: [x] Met: [] Not Met: [] Adjusted  2. Patient will have a decrease in pain to facilitate improvement in movement, function, and ADLs as indicated by Functional Deficits.   [x] Progressing: [x] Met: [] Not Met: [] Adjusted     Long Term Goals: To be achieved in: 8 weeks  1. Disability index score of 20% or less for the LEFS to assist with reaching prior level of function. [] Progressing: [x] Met: [] Not Met: [] Adjusted  Comment:  8% disability on LEFS as of 5/11/22   2. Patient will demonstrate increased AROM to 0-120 to allow for proper joint functioning as indicated by patients Functional Deficits. [] Progressing: [x] Met: [] Not Met: [] Adjusted    3. Patient will demonstrate an increase in strength to good proximal hip strength and control, within 5lb HHD in LE to allow for proper functional mobility as indicated by patients Functional Deficits. [x] Progressing: [] Met: [] Not Met: [] Adjusted   4. Patient will demonstrate normal gait mechanics without increased symptoms or restriction to allow him to walk and perform all daily mobility. [] Progressing: [x] Met: [] Not Met: [] Adjusted     5. Patient will be able to navigate stairs up/down with reciprocal gait mechanics without increased symptoms or restriction to allow her normal household mobility without restricted access to certain parts of his home. [] Progressing: [x] Met: [] Not Met: [] Adjusted           Progression Towards Functional goals:  [x] Patient is progressing as expected towards functional goals listed. [] Progression is slowed due to complexities listed. [] Progression has been slowed due to co-morbidities. [] Plan just implemented, too soon to assess goals progression  [] Other:     ASSESSMENT:   Session focus on LE strength, endurance, functional progression. Planning on finishing out the month of May with 1x per week frequency, then will shift to 1x every other week to begin moving towards discharge.     Return to Play: (if applicable)   []  Stage 1: Intro to Strength   []  Stage 2: Return to Run and Strength   []  Stage 3: Return to Jump and Strength   []  Stage 4: Dynamic Strength and Agility   []

## 2022-05-25 ENCOUNTER — HOSPITAL ENCOUNTER (OUTPATIENT)
Dept: PHYSICAL THERAPY | Age: 62
Setting detail: THERAPIES SERIES
Discharge: HOME | End: 2022-05-25
Payer: COMMERCIAL

## 2022-05-25 PROCEDURE — 97112 NEUROMUSCULAR REEDUCATION: CPT

## 2022-05-25 PROCEDURE — 97110 THERAPEUTIC EXERCISES: CPT

## 2022-05-25 NOTE — FLOWSHEET NOTE
[facility], [place]     Physical Therapy Treatment Note/ Progress Report:     Date:  2022    Patient Name:  Omar Barker    :  1960  MRN: [id number]  Medical/Treatment Diagnosis Information:  · Diagnosis: M23.306 Other meniscus derangements, unspecified meniscus, R knee; M25.561 R knee pain  · Treatment Diagnosis: M23.306 Other meniscus derangements, unspecified meniscus, R knee; M25.561 R knee pain  Insurance/Certification information:  PT Insurance Information: [facility]  Physician Information:  Referring Practitioner: Klarissa Ansari  Plan of care signed (Y/N):     Date of Patient follow up with Physician:      Progress Report: []  Yes  [x]  No     Functional Scale: 8% disability - LEFS (59/64)   Date: 22    Date Range for reporting period:  Beginnin21  Endin days or 10 visits    Progress report due (10 Rx/or 30 days whichever is less):     Recertification due (POC duration/ or 90 days whichever is less): 22    Visit # Insurance Allowable Auth Needed   76  (37 post-op for L knee) [facility] []Yes    [x]No     Pain level:  0/10    SUBJECTIVE: Reports that her [employer] workout program has had a donkey kick included in it the past 2-3 days. Thinks the pressure of kneeling on the L knee may have irritated it because she has had consistent achiness on the front, inside of the L knee since then.      OBJECTIVE: 22   Observation:    Test measurements:  127 degrees L knee flexion after stretching     HIP Abd      HIP Ext       HIP IR       HIP ER       Knee ext 0 0   Knee Flex 123 125   Ankle PF       Ankle DF       Ankle In       Ankle Ev       Strength  LEFT RIGHT   HIP Flexors  32.2# 31.8#   HIP Abductors 15.8# 15.9#   HIP Ext       Hip ER       Knee EXT (quad) 48.6#  47.4#   Knee Flex (HS) 36.9# 44.0#   Ankle DF       Ankle PF       Ankle Inv       Ankle EV               Circumference  Mid apex  7 cm prox       46.0 cm  51.0 cm     45.5 cm  51.0 cm     Gait:  Ambulating without AD; normal mechanics. RESTRICTIONS/PRECAUTIONS: High BP. Previous tobacco use (5 per day). R knee scope, meniscus repair on 9/17/21. L knee scope, meniscus repair on 12/17/21. Exercises/Interventions:     Therapeutic Exercise Resistance Sets/sec Reps Notes   Recumbent bike 5  10 min    Soleus stretch on SB  30s 5    Hamstring stretch Long sitting 30s 5    Ankle pumps HEP   Heelslides w/strap Supine Pushing into the range more, bilat. Quad sets     SAQ over bolster    LAQ @EOB 0-90 Performed with BFR. See below. SLR flexion  Performed with BFR. See below. SLR abduction  Performed with BFR. See below. SLR adduction 2#    [place] quad set/SLR+  5s 10 bilat   Lisa@[employer] High LOP (160 mmHg) Blue cuff 30-15-15-15  SLR flexion/sidelying hip abd/LAQ/standing hamstring curls    **Performed at end of session starting 2/14          Leg press -SL 70#  1 25 bilat   Quad extension - SL - Active motion 10#   1  1   15  15     45 deg  45 deg   Hamstring curls - Cybex - SL 15# 1 15 bilat   Minisquat with heel raise - soleus bias  1 15 bilat   Seated soleus raises - 15# KB (blue) - toes on 1/2 bolster  2 10 bilat   Mod. BOSU lateral lunge - opp limb on 6\" step, weight shift   10s 8 bilat   Clamshells sidelying Orange loop 5s 2x10 bilat   Glut bridge w/alt march 1 10                           Therapeutic Activities       Lateral step ups on 6\"/ecc downs  bilat 1   12   ecc focus   Squat taps to chair Elevated x1 foam No UE support   Step ups/overs on 8\" - No UE support  1 15 bilat   TRX staggered stance squat tap -  Single arm TRX support 2 10 bilat   Split stance lunges to x2 foam - UE support   2 5-8 Bilat; To work on strength and confidence with getting up from floor                 Manual Intervention       Patella mobs - inferior, medial/lateral - supine, EOB bilat  3 min Gr. II-III   Knee PROM - supine, EOB bilat  4 min Pushing into flexion range more now. NMR Re-education       Biodex - limits of stability   Medium skill level lvl 9 stability      CC TKE Resistance 3.5 bilat   Mod. BOSU lunge isos   10s 8 bilat   Sport cord march (green) Side/side 2 10 Each position; To begin initiating SLS positions   SLS balance w/SC resistance at knee yellow 20s 5 bilat   Reverse,  lunge w/slider  1 12 bilat   Sport cord fwd/bwd, side/side walkouts - ecc focus 2 yellow cords  5x Each direction     Access Code: [email or web address]  URL: [email or web address]  Date: 10/06/2021  Prepared by: Leigha Burton    Exercises    *In addition to pre-op exercises including hamstring stretch, gastroc stretch, heelslides, quad sets, SLR flexion. Therapeutic Exercise and NMR EXR  [x] (60901) Provided verbal/tactile cueing for activities related to strengthening, flexibility, endurance, ROM for improvements in LE, proximal hip, and core control with self care, mobility, lifting, ambulation. [x] (85587) Provided verbal/tactile cueing for activities related to improving balance, coordination, kinesthetic sense, posture, motor skill, proprioception  to assist with LE, proximal hip, and core control in self care, mobility, lifting, ambulation and eccentric single leg control.      NMR and Therapeutic Activities:    [x] (79960 or 67298) Provided verbal/tactile cueing for activities related to improving balance, coordination, kinesthetic sense, posture, motor skill, proprioception and motor activation to allow for proper function of core, proximal hip and LE with self care and ADLs  [x] (30143) Gait Re-education- Provided training and instruction to the patient for proper LE, core and proximal hip recruitment and positioning and eccentric body weight control with ambulation re-education including up and down stairs     Home Exercise Program:    [x] (30141) Reviewed/Progressed HEP activities related to strengthening, flexibility, endurance, ROM of core, proximal hip and LE for functional self-care, mobility, lifting and ambulation/stair navigation   [x] (27379)Reviewed/Progressed HEP activities related to improving balance, coordination, kinesthetic sense, posture, motor skill, proprioception of core, proximal hip and LE for self care, mobility, lifting, and ambulation/stair navigation      Manual Treatments:  PROM / STM / Oscillations-Mobs:  G-I, II, III, IV (PA's, Inf., Post.)  [x] (76659) Provided manual therapy to mobilize LE, proximal hip and/or LS spine soft tissue/joints for the purpose of modulating pain, promoting relaxation,  increasing ROM, reducing/eliminating soft tissue swelling/inflammation/restriction, improving soft tissue extensibility and allowing for proper ROM for normal function with self care, mobility, lifting and ambulation. Modalities:  . Deferred. Will ice at home. Charges:  Timed Code Treatment Minutes: 50   Total Treatment Minutes: 50       [] EVAL (LOW) 01327 (typically 20 minutes face-to-face)   [] EVAL (MOD) 61266 (typically 30 minutes face-to-face)  [] EVAL (HIGH) 29569 (typically 45 minutes face-to-face)  [] RE-EVAL     [x] TV(55865) x 2    [] IONTO (95719)  [x] NMR (48188) x 1   [] VASO (25803)  [] Manual (46241) x     [] Other:  DN - 1 or 2 muscles (17565)  [] TA (92590)x    [] Mech Traction (32051)  [] ES(attended) (70905)     [] ES (un) (67921):     GOALS:  Patient stated goal: Be able to do normal activities. [x] Progressing: [] Met: [] Not Met: [] Adjusted    Therapist goals for Patient:   Short Term Goals: To be achieved in: 2 weeks  1. Independent in HEP and progression per patient tolerance, in order to prevent re-injury. [] Progressing: [x] Met: [] Not Met: [] Adjusted  2. Patient will have a decrease in pain to facilitate improvement in movement, function, and ADLs as indicated by Functional Deficits. [x] Progressing: [x] Met: [] Not Met: [] Adjusted     Long Term Goals: To be achieved in: 8 weeks  1.  Disability index score of 20% or less for the LEFS to assist with reaching prior level of function. [] Progressing: [x] Met: [] Not Met: [] Adjusted  Comment:  8% disability on LEFS as of 5/11/22   2. Patient will demonstrate increased AROM to 0-120 to allow for proper joint functioning as indicated by patients Functional Deficits. [] Progressing: [x] Met: [] Not Met: [] Adjusted    3. Patient will demonstrate an increase in strength to good proximal hip strength and control, within 5lb HHD in LE to allow for proper functional mobility as indicated by patients Functional Deficits. [x] Progressing: [] Met: [] Not Met: [] Adjusted   4. Patient will demonstrate normal gait mechanics without increased symptoms or restriction to allow him to walk and perform all daily mobility. [] Progressing: [x] Met: [] Not Met: [] Adjusted     5. Patient will be able to navigate stairs up/down with reciprocal gait mechanics without increased symptoms or restriction to allow her normal household mobility without restricted access to certain parts of his home. [] Progressing: [x] Met: [] Not Met: [] Adjusted           Progression Towards Functional goals:  [x] Patient is progressing as expected towards functional goals listed. [] Progression is slowed due to complexities listed. [] Progression has been slowed due to co-morbidities. [] Plan just implemented, too soon to assess goals progression  [] Other:     ASSESSMENT:   Reports that her [employer] workout program has had a donkey kick included in it the past 2-3 days. Thinks the pressure of kneeling on the L knee may have irritated it because she has had consistent achiness on the front, inside of the L knee since then. Noted patient not getting terminal knee extension on L during stance phase or during tasks like sport cord marching where the stance limb is cued to be straight for strength/stability.  Also noted that tasks like SLS balance with sport cord behind knee cueing for TKE reproduces patient's medial L knee pain sxs. VMO act/endurance with SLR+ is poor and moderately painful. Focused on quad activation/endurance with TKE today to address findings and patient complaints. Encouraged performance of SLR+ at home to continue addressing outside of PT. Planned on finishing out the month of May with 1x per week frequency, then will shift to 1x every other week to begin moving towards discharge, however, if patient needs to come in again next week due to recent increase in L medial knee discomfort, she will call. Return to Play: (if applicable)   []  Stage 1: Intro to Strength   []  Stage 2: Return to Run and Strength   []  Stage 3: Return to Jump and Strength   []  Stage 4: Dynamic Strength and Agility   []  Stage 5: Sport Specific Training     []  Ready to Return to Play, Meets All Above Stages   []  Not Ready for Return to Sports   Comments:            Treatment/Activity Tolerance:  [x] Patient tolerated treatment well [] Patient limited by fatique  [] Patient limited by pain  [] Patient limited by other medical complications  [] Other:     Overall Progression Towards Functional goals/ Treatment Progress Update:  [x] Patient is progressing as expected towards functional goals listed. [] Progression is slowed due to complexities/Impairments listed. [] Progression has been slowed due to co-morbidities. [] Plan just implemented, too soon to assess goals progression <30days   [] Goals require adjustment due to lack of progress  [] Patient is not progressing as expected and requires additional follow up with physician  [] Other    Prognosis for POC: [x] Good [] Fair  [] Poor    Patient requires continued skilled intervention: [x] Yes  [] No        PLAN: 1x per week for 4 weeks.   [x] Continue per plan of care [] Alter current plan (see comments)  [] Plan of care initiated [] Hold pending MD visit [] Discharge    Electronically signed by: Deborah Moura, PT, DPT, MS, SCS    Note: If patient does not return for scheduled/recommended follow up visits, this note will serve as a discharge from care along with the most recent update on progress.

## 2022-06-08 ENCOUNTER — HOSPITAL ENCOUNTER (OUTPATIENT)
Dept: PHYSICAL THERAPY | Age: 62
Setting detail: THERAPIES SERIES
Discharge: HOME | End: 2022-06-08
Payer: COMMERCIAL

## 2022-06-08 PROCEDURE — 97110 THERAPEUTIC EXERCISES: CPT

## 2022-06-08 PROCEDURE — 97112 NEUROMUSCULAR REEDUCATION: CPT

## 2022-06-08 NOTE — FLOWSHEET NOTE
[facility]     Physical Therapy Treatment Note/ Progress Report:     Date:  2022    Patient Name:  Lencho Self    :  1960  MRN: [id number]  Medical/Treatment Diagnosis Information:  · Diagnosis: M23.306 Other meniscus derangements, unspecified meniscus, R knee; M25.561 R knee pain  · Treatment Diagnosis: M23.306 Other meniscus derangements, unspecified meniscus, R knee; M25.561 R knee pain  Insurance/Certification information:  PT Insurance Information: [facility]  Physician Information:  Referring Practitioner: Bradley Mcconnell  Plan of care signed (Y/N):     Date of Patient follow up with Physician:      Progress Report: []  Yes  [x]  No     Functional Scale: 8% disability - LEFS (59/64)   Date: 22    Date Range for reporting period:  Beginnin21  Endin days or 10 visits    Progress report due (10 Rx/or 30 days whichever is less): 22    Recertification due (POC duration/ or 90 days whichever is less): 22    Visit # Insurance Allowable Auth Needed   69  (38 post-op for L knee) [facility] []Yes    [x]No     Pain level:  0/10    SUBJECTIVE: Doing very well. Went to [place] to visit her mother-in-law a week or so ago. Did really well with all the walking through the airport and with all of the yard work and other activities done to help out her 80year-old mother-in-law. Continued to do [employer] work outs daily even while in [place].      OBJECTIVE: 22   Observation:    Test measurements:  127 degrees L knee flexion after stretching     HIP Abd      HIP Ext       HIP IR       HIP ER       Knee ext 0 0   Knee Flex 123 125   Ankle PF       Ankle DF       Ankle In       Ankle Ev       Strength  LEFT RIGHT   HIP Flexors  32.2# 31.8#   HIP Abductors 15.8# 15.9#   HIP Ext       Hip ER       Knee EXT (quad) 48.6#  47.4#   Knee Flex (HS) 36.9# 44.0#   Ankle DF       Ankle PF       Ankle Inv       Ankle EV Circumference  Mid apex  7 cm prox       46.0 cm  51.0 cm     45.5 cm  51.0 cm     Gait:  Ambulating without AD; normal mechanics. RESTRICTIONS/PRECAUTIONS: High BP. Previous tobacco use (5 per day). R knee scope, meniscus repair on 9/17/21. L knee scope, meniscus repair on 12/17/21. Exercises/Interventions:     Therapeutic Exercise Resistance Sets/sec Reps Notes   Recumbent bike 5  10 min    Soleus stretch on SB  30s 5    Hamstring stretch Long sitting 30s 5    Ankle pumps HEP   Heelslides w/strap Supine Pushing into the range more, bilat. Quad sets     SAQ over bolster    LAQ @EOB 0-90 Performed with BFR. See below. SLR flexion  Performed with BFR. See below. SLR abduction  Performed with BFR. See below. SLR adduction 2#    [place] quad set/SLR+  5s 10 bilat   [email or web address] LOP (160 mmHg) Blue cuff 30-15-15-15  SLR flexion/sidelying hip abd/LAQ/standing hamstring curls    **Performed at end of session starting 2/14          Leg press -SL 80#  1 25 bilat   Quad extension - SL - Active motion 15#   1 15 60 deg   Hamstring curls - Cybex - SL 15# 1 15 bilat   Minisquat with heel raise - soleus bias  1 15 bilat   Seated soleus raises - 15# KB (blue) - toes on 1/2 bolster  2 10 bilat   Mod. BOSU lateral lunge - opp limb on 6\" step, weight shift   10s 8 bilat   Clamshells sidelying Orange loop 5s 2x10 bilat   Glut bridge w/alt march 1 10    [place] on [place] loop 2 8 bilat                   Therapeutic Activities       Lateral step ups on 6\"/ecc downs  bilat 1   12   ecc focus   Squat taps to chair Elevated x1 foam No UE support   Step ups/overs on 8\" - No UE support  1 15 bilat   TRX staggered stance squat tap -  Single arm TRX support 2 10 bilat   Split stance lunges to x2 foam - UE support   2 5-8 Bilat;  To work on strength and confidence with getting up from floor                 Manual Intervention       Patella mobs - inferior, medial/lateral - supine, EOB bilat  3 min Gr. II-III   Knee PROM - supine, EOB bilat  4 min Pushing into flexion range more now. NMR Re-education       Biodex - limits of stability   Medium skill level lvl 9 stability      CC TKE Resistance 3.5 bilat   Mod. BOSU lunge isos   10s 8 bilat   Sport cord march (green) Side/side 2 10 Each position; To begin initiating SLS positions   SLS balance w/SC resistance at knee yellow 20s 5 bilat   Reverse, diagonal, lateral lunge w/slider  1 12 bilat   Sport cord fwd/bwd, side/side walkouts - ecc focus 2 yellow cords  5x Each direction   KB cross body reaches - mod. SL 10# KBs (2) 2 5 bilat     Access Code: [email or web address]  URL: [email or web address]  Date: 10/06/2021  Prepared by: Lonnie Garrett    Exercises    *In addition to pre-op exercises including hamstring stretch, gastroc stretch, heelslides, quad sets, SLR flexion. Therapeutic Exercise and NMR EXR  [x] (08338) Provided verbal/tactile cueing for activities related to strengthening, flexibility, endurance, ROM for improvements in LE, proximal hip, and core control with self care, mobility, lifting, ambulation. [x] (04217) Provided verbal/tactile cueing for activities related to improving balance, coordination, kinesthetic sense, posture, motor skill, proprioception  to assist with LE, proximal hip, and core control in self care, mobility, lifting, ambulation and eccentric single leg control.      NMR and Therapeutic Activities:    [x] (80737 or 15922) Provided verbal/tactile cueing for activities related to improving balance, coordination, kinesthetic sense, posture, motor skill, proprioception and motor activation to allow for proper function of core, proximal hip and LE with self care and ADLs  [x] (21322) Gait Re-education- Provided training and instruction to the patient for proper LE, core and proximal hip recruitment and positioning and eccentric body weight control with ambulation re-education including up and down stairs     Home Exercise Program:    [x] (07961) Reviewed/Progressed HEP activities related to strengthening, flexibility, endurance, ROM of core, proximal hip and LE for functional self-care, mobility, lifting and ambulation/stair navigation   [x] (98155)Reviewed/Progressed HEP activities related to improving balance, coordination, kinesthetic sense, posture, motor skill, proprioception of core, proximal hip and LE for self care, mobility, lifting, and ambulation/stair navigation      Manual Treatments:  PROM / STM / Oscillations-Mobs:  G-I, II, III, IV (PA's, Inf., Post.)  [x] (87546) Provided manual therapy to mobilize LE, proximal hip and/or LS spine soft tissue/joints for the purpose of modulating pain, promoting relaxation,  increasing ROM, reducing/eliminating soft tissue swelling/inflammation/restriction, improving soft tissue extensibility and allowing for proper ROM for normal function with self care, mobility, lifting and ambulation. Modalities:  . Deferred. Will ice at home. Charges:  Timed Code Treatment Minutes: 45   Total Treatment Minutes: 45       [] EVAL (LOW) 24175 (typically 20 minutes face-to-face)   [] EVAL (MOD) 54490 (typically 30 minutes face-to-face)  [] EVAL (HIGH) 91073 (typically 45 minutes face-to-face)  [] RE-EVAL     [x] YP(43418) x 2    [] IONTO (26301)  [x] NMR (24192) x 1   [] VASO (07868)  [] Manual (06524) x     [] Other:  DN - 1 or 2 muscles (38049)  [] TA (17682)x    [] Mech Traction (94385)  [] ES(attended) (97438)     [] ES (un) (68889):     GOALS:  Patient stated goal: Be able to do normal activities. [x] Progressing: [] Met: [] Not Met: [] Adjusted    Therapist goals for Patient:   Short Term Goals: To be achieved in: 2 weeks  1. Independent in HEP and progression per patient tolerance, in order to prevent re-injury. [] Progressing: [x] Met: [] Not Met: [] Adjusted  2.  Patient will have a decrease in pain to facilitate improvement in movement, function, and ADLs as indicated by Functional Deficits. [x] Progressing: [x] Met: [] Not Met: [] Adjusted     Long Term Goals: To be achieved in: 8 weeks  1. Disability index score of 20% or less for the LEFS to assist with reaching prior level of function. [] Progressing: [x] Met: [] Not Met: [] Adjusted  Comment:  8% disability on LEFS as of 5/11/22   2. Patient will demonstrate increased AROM to 0-120 to allow for proper joint functioning as indicated by patients Functional Deficits. [] Progressing: [x] Met: [] Not Met: [] Adjusted    3. Patient will demonstrate an increase in strength to good proximal hip strength and control, within 5lb HHD in LE to allow for proper functional mobility as indicated by patients Functional Deficits. [x] Progressing: [] Met: [] Not Met: [] Adjusted   4. Patient will demonstrate normal gait mechanics without increased symptoms or restriction to allow him to walk and perform all daily mobility. [] Progressing: [x] Met: [] Not Met: [] Adjusted     5. Patient will be able to navigate stairs up/down with reciprocal gait mechanics without increased symptoms or restriction to allow her normal household mobility without restricted access to certain parts of his home. [] Progressing: [x] Met: [] Not Met: [] Adjusted           Progression Towards Functional goals:  [x] Patient is progressing as expected towards functional goals listed. [] Progression is slowed due to complexities listed. [] Progression has been slowed due to co-morbidities. [] Plan just implemented, too soon to assess goals progression  [] Other:     ASSESSMENT:   Patient doing very well. Recently traveled to [place] to see her mother-in-law. Did very well with all the travel and activities while in [place] with no pain or difficulty with all activities. Definitely seeing good improvements in overall endurance and activity tolerance. Plan on seeing patient at least one more time in two weeks with possible discharge to follow.      Return to Play: (if applicable)   []  Stage 1: Intro to Strength   []  Stage 2: Return to Run and Strength   []  Stage 3: Return to Jump and Strength   []  Stage 4: Dynamic Strength and Agility   []  Stage 5: Sport Specific Training     []  Ready to Return to Play, Meets All Above Stages   []  Not Ready for Return to Sports   Comments:            Treatment/Activity Tolerance:  [x] Patient tolerated treatment well [] Patient limited by fatique  [] Patient limited by pain  [] Patient limited by other medical complications  [] Other:     Overall Progression Towards Functional goals/ Treatment Progress Update:  [x] Patient is progressing as expected towards functional goals listed. [] Progression is slowed due to complexities/Impairments listed. [] Progression has been slowed due to co-morbidities. [] Plan just implemented, too soon to assess goals progression <30days   [] Goals require adjustment due to lack of progress  [] Patient is not progressing as expected and requires additional follow up with physician  [] Other    Prognosis for POC: [x] Good [] Fair  [] Poor    Patient requires continued skilled intervention: [x] Yes  [] No        PLAN: 1x per week for 4 weeks. [x] Continue per plan of care [] Alter current plan (see comments)  [] Plan of care initiated [] Hold pending MD visit [] Discharge    Electronically signed by: Clark Smith, PT, DPT, MS, SCS    Note: If patient does not return for scheduled/recommended follow up visits, this note will serve as a discharge from care along with the most recent update on progress.

## 2022-06-22 ENCOUNTER — HOSPITAL ENCOUNTER (OUTPATIENT)
Dept: PHYSICAL THERAPY | Age: 62
Setting detail: THERAPIES SERIES
Discharge: HOME | End: 2022-06-22
Payer: COMMERCIAL

## 2022-06-22 ENCOUNTER — OFFICE VISIT (OUTPATIENT)
Dept: ORTHOPEDIC SURGERY | Age: 62
End: 2022-06-22
Payer: COMMERCIAL

## 2022-06-22 VITALS — BODY MASS INDEX: 36.29 KG/M2 | HEIGHT: 69 IN | WEIGHT: 245 LBS

## 2022-06-22 DIAGNOSIS — M25.562 LEFT KNEE PAIN, UNSPECIFIED CHRONICITY: Primary | ICD-10-CM

## 2022-06-22 PROCEDURE — 3017F COLORECTAL CA SCREEN DOC REV: CPT | Performed by: ORTHOPAEDIC SURGERY

## 2022-06-22 PROCEDURE — 99213 OFFICE O/P EST LOW 20 MIN: CPT | Performed by: ORTHOPAEDIC SURGERY

## 2022-06-22 PROCEDURE — 97112 NEUROMUSCULAR REEDUCATION: CPT

## 2022-06-22 PROCEDURE — G8427 DOCREV CUR MEDS BY ELIG CLIN: HCPCS | Performed by: ORTHOPAEDIC SURGERY

## 2022-06-22 PROCEDURE — 97110 THERAPEUTIC EXERCISES: CPT

## 2022-06-22 PROCEDURE — G8417 CALC BMI ABV UP PARAM F/U: HCPCS | Performed by: ORTHOPAEDIC SURGERY

## 2022-06-22 PROCEDURE — 1036F TOBACCO NON-USER: CPT | Performed by: ORTHOPAEDIC SURGERY

## 2022-06-22 NOTE — PROGRESS NOTES
[facility], [place]     Physical Therapy Treatment Note/ Progress Report:     Date:  2022    Patient Name:  Fletcher Montgomery    :  1960  MRN: [id number]  Medical/Treatment Diagnosis Information:  · Diagnosis: M23.306 Other meniscus derangements, unspecified meniscus, R knee; M25.561 R knee pain  · Treatment Diagnosis: M23.306 Other meniscus derangements, unspecified meniscus, R knee; M25.561 R knee pain  Insurance/Certification information:  PT Insurance Information: [facility]  Physician Information:  Referring Practitioner: Angelina Calvo  Plan of care signed (Y/N):     Date of Patient follow up with Physician:      Progress Report: [x]  Yes  []  No     Functional Scale: 5% disability - LEFS ()   Date: 22    Date Range for reporting period:  Beginnin21  Endin days or 10 visits    Progress report due (10 Rx/or 30 days whichever is less):     Recertification due (POC duration/ or 90 days whichever is less): 22    Visit # Insurance Allowable Auth Needed   70  (39 post-op for L knee) [facility] []Yes    [x]No     Pain level:  0/10    SUBJECTIVE: Doing very well. Continuing Hinge work outs daily. Now has a very consistent work out routine. Now getting on/off the floor with no issues. Does have some continued sensitivity to kneeling on her knees for a prolonged period of time, but continuing to work on her sensitivity and tolerance to kneeling at home. Does have a couple sutures popping through on a portal site on both knees, but patient had her follow up with Dr. Carlos Hawkins and he thought those would pop through on their own and take care of themselves. Patient was discharged from Dr. Christianna Osler care today. Feels like she is ready for discharge from PT as well.       OBJECTIVE: 22   Observation:    Test measurements:  127 degrees L knee flexion after stretching     HIP Abd      HIP Ext       HIP IR       HIP ER       Knee ext 0 0   Knee Flex 123 125   Ankle PF       Ankle DF       Ankle In       Ankle Ev       Strength  LEFT RIGHT   HIP Flexors  32.2# 31.8#   HIP Abductors 22.1# 23.9#   HIP Ext       Hip ER       Knee EXT (quad) 49.8#  56.8#   Knee Flex (HS) 41.2# 44.0#   Ankle DF       Ankle PF       Ankle Inv       Ankle EV               Circumference  Mid apex  7 cm prox       46.0 cm  51.0 cm     45.5 cm  51.0 cm     Gait:  Ambulating without AD; normal mechanics. RESTRICTIONS/PRECAUTIONS: High BP. Previous tobacco use (5 per day). R knee scope, meniscus repair on 9/17/21. L knee scope, meniscus repair on 12/17/21. Exercises/Interventions:     Therapeutic Exercise Resistance Sets/sec Reps Notes   Recumbent bike 5  10 min    Soleus stretch on SB  30s 5    Hamstring stretch Long sitting 30s 5    Ankle pumps HEP   Heelslides w/strap Supine Pushing into the range more, bilat. Quad sets     SAQ over bolster    LAQ @EOB 0-90 Performed with BFR. See below. SLR flexion  Performed with BFR. See below. SLR abduction  Performed with BFR. See below.    SLR adduction 2#    Sprakers quad set/SLR+  5s 10 bilat   [email or web address] LOP (160 mmHg) Blue cuff 30-15-15-15  SLR flexion/sidelying hip abd/LAQ/standing hamstring curls    **Performed at end of session starting 2/14          Leg press -SL 80#  1 25 bilat   Quad extension - SL - Active motion 15#   2 10 60 deg   Hamstring curls - Cybex - SL 30# 2 10 bilat   Minisquat with heel raise - soleus bias  1 15 bilat   Seated soleus raises - 15# KB (blue) - toes on 1/2 bolster  2 10 bilat   BOSU lateral lunge   5s 10 bilat   Clamshells sidelying Orange loop 5s 2x10 bilat   Glut bridge w/alt march 1 10    Steamboats on airex [place] loop 2 8 bilat                   Therapeutic Activities       Lateral step ups on 6\"/ecc downs  bilat 1   12   ecc focus   Squat taps to chair Elevated x1 foam No UE support   Step ups/overs on 8\" - No UE support  1 15 bilat   TRX staggered stance squat tap -  Single arm TRX support 2 10 bilat   Split stance lunges to x2 foam - UE support   2 5-8 Bilat; To work on strength and confidence with getting up from floor                 Manual Intervention       Patella mobs - inferior, medial/lateral - supine, EOB bilat  3 min Gr. II-III   Knee PROM - supine, EOB bilat  4 min Pushing into flexion range more now. NMR Re-education       Biodex - limits of stability   Medium skill level lvl 9 stability      CC TKE Resistance 3.5 bilat   Mod. BOSU lunge isos   10s 8 bilat   Sport cord march (green) Side/side 2 10 Each position; To begin initiating SLS positions   SLS balance w/SC resistance at knee yellow 20s 5 bilat   Reverse, diagonal, lateral lunge w/slider 10# KB (green) 1 8 bilat   Sport cord fwd/bwd, side/side walkouts - ecc focus 2 yellow cords  5x Each direction   KB cross body reaches - mod. SL 10# KBs (2) 2 5 bilat     Access Code: [email or web address]  URL: [email or web address]  Date: 10/06/2021  Prepared by: Cabrera Simon    Exercises    *In addition to pre-op exercises including hamstring stretch, gastroc stretch, heelslides, quad sets, SLR flexion. Therapeutic Exercise and NMR EXR  [x] (68932) Provided verbal/tactile cueing for activities related to strengthening, flexibility, endurance, ROM for improvements in LE, proximal hip, and core control with self care, mobility, lifting, ambulation. [x] (42875) Provided verbal/tactile cueing for activities related to improving balance, coordination, kinesthetic sense, posture, motor skill, proprioception  to assist with LE, proximal hip, and core control in self care, mobility, lifting, ambulation and eccentric single leg control.      NMR and Therapeutic Activities:    [x] (54113 or 94100) Provided verbal/tactile cueing for activities related to improving balance, coordination, kinesthetic sense, posture, motor skill, proprioception and motor activation to allow for proper function of core, proximal hip and LE with self care and ADLs  [x] (09770) Gait Re-education- Provided training and instruction to the patient for proper LE, core and proximal hip recruitment and positioning and eccentric body weight control with ambulation re-education including up and down stairs     Home Exercise Program:    [x] (75963) Reviewed/Progressed HEP activities related to strengthening, flexibility, endurance, ROM of core, proximal hip and LE for functional self-care, mobility, lifting and ambulation/stair navigation   [x] (38430)Reviewed/Progressed HEP activities related to improving balance, coordination, kinesthetic sense, posture, motor skill, proprioception of core, proximal hip and LE for self care, mobility, lifting, and ambulation/stair navigation      Manual Treatments:  PROM / STM / Oscillations-Mobs:  G-I, II, III, IV (PA's, Inf., Post.)  [x] (30054) Provided manual therapy to mobilize LE, proximal hip and/or LS spine soft tissue/joints for the purpose of modulating pain, promoting relaxation,  increasing ROM, reducing/eliminating soft tissue swelling/inflammation/restriction, improving soft tissue extensibility and allowing for proper ROM for normal function with self care, mobility, lifting and ambulation. Modalities:  . Deferred. Will ice at home. Charges:  Timed Code Treatment Minutes: 50   Total Treatment Minutes: 50       [] EVAL (LOW) 36538 (typically 20 minutes face-to-face)   [] EVAL (MOD) 19128 (typically 30 minutes face-to-face)  [] EVAL (HIGH) 95276 (typically 45 minutes face-to-face)  [] RE-EVAL     [x] NR(34802) x 2    [] IONTO (46899)  [x] NMR (34589) x 1   [] VASO (22417)  [] Manual (01792) x     [] Other:  DN - 1 or 2 muscles (64493)  [] TA (20047)x    [] Mech Traction (82761)  [] ES(attended) (04086)     [] ES (un) (13453):     GOALS:  Patient stated goal: Be able to do normal activities. [] Progressing: [x] Met: [] Not Met: [] Adjusted    Therapist goals for Patient:   Short Term Goals:  To be achieved in: 2 weeks  1. Independent in HEP and progression per patient tolerance, in order to prevent re-injury. [] Progressing: [x] Met: [] Not Met: [] Adjusted  2. Patient will have a decrease in pain to facilitate improvement in movement, function, and ADLs as indicated by Functional Deficits. [] Progressing: [x] Met: [] Not Met: [] Adjusted     Long Term Goals: To be achieved in: 8 weeks  1. Disability index score of 20% or less for the LEFS to assist with reaching prior level of function. [] Progressing: [x] Met: [] Not Met: [] Adjusted  Comment:  5% disability on LEFS as of 6/22/22   2. Patient will demonstrate increased AROM to 0-120 to allow for proper joint functioning as indicated by patients Functional Deficits. [] Progressing: [x] Met: [] Not Met: [] Adjusted    3. Patient will demonstrate an increase in strength to good proximal hip strength and control, within 5lb HHD in LE to allow for proper functional mobility as indicated by patients Functional Deficits. [] Progressing: [x] Met: [] Not Met: [] Adjusted   4. Patient will demonstrate normal gait mechanics without increased symptoms or restriction to allow him to walk and perform all daily mobility. [] Progressing: [x] Met: [] Not Met: [] Adjusted     5. Patient will be able to navigate stairs up/down with reciprocal gait mechanics without increased symptoms or restriction to allow her normal household mobility without restricted access to certain parts of his home. [] Progressing: [x] Met: [] Not Met: [] Adjusted           Progression Towards Functional goals:  [x] Patient is progressing as expected towards functional goals listed. [] Progression is slowed due to complexities listed. [] Progression has been slowed due to co-morbidities. [] Plan just implemented, too soon to assess goals progression  [] Other:     ASSESSMENT:  Doing very well. Continuing Hinge work outs daily. Now has a very consistent work out routine.  Now getting on/off the floor with no issues. Does have some continued sensitivity to kneeling on her knees for a prolonged period of time, but continuing to work on her sensitivity and tolerance to kneeling at home. Does have a couple sutures popping through on a portal site on both knees, but patient had her follow up with Dr. Marge Craven and he thought those would pop through on their own and take care of themselves. Patient was discharged from Dr. Rachel Gomez care today. Feels like she is ready for discharge from PT as well. Updated measures do indicate good improvement in hip strength since previously assessed. Patient has met all STGs and LTGs at this time. She is appropriate for discharge at this time. Will keep chart open in case patient has any insidious issues over the next few months. Return to Play: (if applicable)   []  Stage 1: Intro to Strength   []  Stage 2: Return to Run and Strength   []  Stage 3: Return to Jump and Strength   []  Stage 4: Dynamic Strength and Agility   []  Stage 5: Sport Specific Training     []  Ready to Return to Play, Meets All Above Stages   []  Not Ready for Return to Sports   Comments:            Treatment/Activity Tolerance:  [x] Patient tolerated treatment well [] Patient limited by fatique  [] Patient limited by pain  [] Patient limited by other medical complications  [] Other:     Overall Progression Towards Functional goals/ Treatment Progress Update:  [x] Patient is progressing as expected towards functional goals listed. [] Progression is slowed due to complexities/Impairments listed. [] Progression has been slowed due to co-morbidities.   [] Plan just implemented, too soon to assess goals progression <30days   [] Goals require adjustment due to lack of progress  [] Patient is not progressing as expected and requires additional follow up with physician  [] Other    Prognosis for POC: [x] Good [] Fair  [] Poor    Patient requires continued skilled intervention: [x] Yes  [] No        PLAN:   [] Continue per plan of care [] Alter current plan (see comments)  [] Plan of care initiated [] Hold pending MD visit [x] Discharge    Electronically signed by: Karma Ortega, PT, DPT, MS, SCS    Note: If patient does not return for scheduled/recommended follow up visits, this note will serve as a discharge from care along with the most recent update on progress.

## 2023-05-23 ENCOUNTER — OFFICE VISIT (OUTPATIENT)
Dept: ORTHOPEDIC SURGERY | Age: 63
End: 2023-05-23

## 2023-05-23 VITALS — BODY MASS INDEX: 36.29 KG/M2 | HEIGHT: 69 IN | WEIGHT: 245 LBS

## 2023-05-23 DIAGNOSIS — Z98.890 HISTORY OF MEDIAL MENISCUS REPAIR OF RIGHT KNEE: Primary | ICD-10-CM

## 2023-05-23 RX ORDER — METHYLPREDNISOLONE 4 MG/1
TABLET ORAL
Qty: 1 KIT | Refills: 0 | Status: SHIPPED | OUTPATIENT
Start: 2023-05-23

## 2023-05-26 NOTE — PROGRESS NOTES
2023     Interval History:  Status post right posterior medial meniscus root repair on 2021  Status post left posterior medial meniscus root repair on 21    The patient does report that she has developed increasing amounts of pain within the right knee over the past month or 2. No traumatic injury. The pain is diffuse about the knee. Is made worse with standing, walking, stairs. Objective:  Ht 5' 9\" (1.753 m)   Wt 245 lb (111.1 kg)   BMI 36.18 kg/m²      Physical Exam:  Examination of the left and right knee   There is no effusion, no gross deformity or skin changes  Range of motion reveals 0 to 130  neg lachman, negative posterior drawer, no pain or laxity with varus or valgus stress at 0 degrees and 30 degrees of flexion  + medial joint line tenderness  5 out of 5 strength throughout distal muscle groups  Sensation is intact to light touch throughout all distributions  There is no calf swelling or tenderness  Palpable DP pulse, brisk cap refill, 2+ symmetric reflexes    Imagin view x-rays of the right knee were obtained the office today on 2023 and reviewed. There is no fracture or dislocation. Early medial joint space narrowing is present. Assessment:  Status post right posterior medial meniscus root repair on 2021  Status post left posterior medial meniscus root repair on 21    Plan:  Fortunately patient has developed increased amounts of pain. I suspect she is suffering from medial compartment degenerative joint disease. However she does reports she is 70% better overall with just time alone. Therefore she elected proceed with just a Medrol Dosepak. She will return as needed in the future we can was consider cortisone injection. Greater than 20 minutes were spent with this encounter.   Time spent included evaluating the patient's chart prior to arrival.  Evaluating the patient in the office including history, physical examination, imaging reviewing,

## 2024-04-15 SDOH — HEALTH STABILITY: PHYSICAL HEALTH: ON AVERAGE, HOW MANY DAYS PER WEEK DO YOU ENGAGE IN MODERATE TO STRENUOUS EXERCISE (LIKE A BRISK WALK)?: 3 DAYS

## 2024-04-15 SDOH — HEALTH STABILITY: PHYSICAL HEALTH: ON AVERAGE, HOW MANY MINUTES DO YOU ENGAGE IN EXERCISE AT THIS LEVEL?: 40 MIN

## 2024-04-17 ENCOUNTER — OFFICE VISIT (OUTPATIENT)
Dept: ORTHOPEDIC SURGERY | Age: 64
End: 2024-04-17
Payer: COMMERCIAL

## 2024-04-17 VITALS — HEIGHT: 69 IN | BODY MASS INDEX: 36.29 KG/M2 | WEIGHT: 245 LBS

## 2024-04-17 DIAGNOSIS — M65.341 TRIGGER FINGER, RIGHT RING FINGER: ICD-10-CM

## 2024-04-17 DIAGNOSIS — M79.641 RIGHT HAND PAIN: Primary | ICD-10-CM

## 2024-04-17 PROCEDURE — G8417 CALC BMI ABV UP PARAM F/U: HCPCS | Performed by: PHYSICIAN ASSISTANT

## 2024-04-17 PROCEDURE — 1036F TOBACCO NON-USER: CPT | Performed by: PHYSICIAN ASSISTANT

## 2024-04-17 PROCEDURE — 3017F COLORECTAL CA SCREEN DOC REV: CPT | Performed by: PHYSICIAN ASSISTANT

## 2024-04-17 PROCEDURE — 99213 OFFICE O/P EST LOW 20 MIN: CPT | Performed by: PHYSICIAN ASSISTANT

## 2024-04-17 PROCEDURE — G8428 CUR MEDS NOT DOCUMENT: HCPCS | Performed by: PHYSICIAN ASSISTANT

## 2024-04-17 RX ORDER — METHYLPREDNISOLONE 4 MG/1
TABLET ORAL
Qty: 1 KIT | Refills: 0 | Status: SHIPPED | OUTPATIENT
Start: 2024-04-17 | End: 2024-04-23

## 2024-04-17 NOTE — PROGRESS NOTES
4/17/2024     Reason for visit:  Right hand pain    History of Present Illness:  Patient is a very pleasant 64-year-old female complains of ongoing right hand pain.  She localizes most of her pain to her fourth and fifth digits.  She says over the past few days she has gripped several objects in her fourth and fifth digit will get stuck in a flexed state and send radiating pain down her forearm close to her elbow.  She says she is able to passively extend her digits after a few seconds, and the pain subsides.  She says this only is happening when she is gripping things, otherwise she does not have any pain at rest.  She denies any significant numbness or tingling.  She localizes most of her pain to her fourth and fifth knuckles.  She denies any significant injury or trauma associated with this.      Objective:  Ht 1.753 m (5' 9\")   Wt 111.1 kg (245 lb)   BMI 36.18 kg/m²      Physical Exam:  The patient is well-appearing and in no apparent distress  Skin: Normal in appearance, Normal Color, and Free of Lesions Bilateral   Finger range of motion is Full and equal bilaterally bilaterally.     Wrist range of motion is Full and equal bilaterally   There is no evidence of gross joint instability bilaterally.  Sensation is subjectively normal in the Whole Hand   Vascular examination reveals normal, good capillary refill, good color, radial pulse present, and warm bilaterally  Swelling/enlargement is absent   Maximal pain is elicited with palpation of the 4th A-1 Pulley. No significant palpable nodule or clicking noted with passive motion of the 4th and 5th digits.  The remainder of the hands & wrists are not tender to palpation.  Muscular strength is clinically appropriate bilaterally.      Imaging:  3 view radiographs of the right hand were ordered and reviewed in clinic today 4/17/2024 demonstrating no significant degenerative changes, and no acute fractures or dislocations      Assessment:  Right 4th digit trigger

## (undated) DEVICE — DRESSING,GAUZE,XEROFORM,CURAD,1"X8",ST: Brand: CURAD

## (undated) DEVICE — DRAPE,U/ SHT,SPLIT,PLAS,STERIL: Brand: MEDLINE

## (undated) DEVICE — Device

## (undated) DEVICE — 4.5 MM FULL RADIUS ELITE STRAIGHT                                    DISPOSABLE BLADES, MAROON,PACKAGED 6                                    PER BOX, STERILE

## (undated) DEVICE — MASC TURNOVER KIT: Brand: MEDLINE INDUSTRIES, INC.

## (undated) DEVICE — GLOVE ORANGE PI 7 1/2   MSG9075

## (undated) DEVICE — APPLICATOR MEDICATED 26 CC SOLUTION HI LT ORNG CHLORAPREP

## (undated) DEVICE — ZIMMER® STERILE DISPOSABLE TOURNIQUET CUFF WITH PLC, DUAL PORT, SINGLE BLADDER, 34 IN. (86 CM)

## (undated) DEVICE — NOVOSTITCH PRO MEN RPR SYS 2-0: Brand: NOVOSTITCH

## (undated) DEVICE — STRIP,CLOSURE,WOUND,MEDI-STRIP,1/2X4: Brand: MEDLINE

## (undated) DEVICE — 3M™ STERI-DRAPE™ ARTHROSCOPY SHEET WITH POUCH 1194: Brand: STERI-DRAPE™

## (undated) DEVICE — STERILE SURGICAL BLADE SIZE 15: Brand: CARDINAL HEALTH

## (undated) DEVICE — SOLUTION IRRIG 3000ML 0.9% SOD CHL USP UROMATIC PLAS CONT

## (undated) DEVICE — DEVON TUBE HOLDER REMOVABLE TOUCH FASTEN STRAP: Brand: DEVON

## (undated) DEVICE — GAUZE,SPONGE,4"X4",8PLY,STRL,LF,10/TRAY: Brand: MEDLINE

## (undated) DEVICE — Z DISCONTINUED USE 2272117 DRAPE SURG 3 QTR N INVASIVE 2 LAYR DISP

## (undated) DEVICE — BNDG,ELSTC,MATRIX,STRL,6"X5YD,LF,HOOK&LP: Brand: MEDLINE

## (undated) DEVICE — SUTURE ETHLN SZ 3-0 L18IN NONABSORBABLE BLK PS-2 L19MM 3/8 1669H

## (undated) DEVICE — SHEET,DRAPE,53X77,STERILE: Brand: MEDLINE

## (undated) DEVICE — GOWN SIRUS NONREIN XL W/TWL: Brand: MEDLINE INDUSTRIES, INC.

## (undated) DEVICE — TOWEL,OR,DSP,ST,BLUE,STD,4/PK,20PK/CS: Brand: MEDLINE

## (undated) DEVICE — PENCIL ES L3M BTTN SWCH S STL HEX LOK BLDE ELECTRD HOLSTER

## (undated) DEVICE — NOVOSTITCH CARTRIDGE 2-0: Brand: NOVOSTITCH

## (undated) DEVICE — SUTURE VCRL SZ 2-0 L27IN ABSRB UD L26MM SH 1/2 CIR J417H

## (undated) DEVICE — HYPODERMIC SAFETY NEEDLE: Brand: MAGELLAN

## (undated) DEVICE — WEREWOLF FLOW 90 COBLATION WAND: Brand: COBLATION

## (undated) DEVICE — DYONICS 25 PATIENT TUBE SET MUST                                    BE USED WITH 7211007, 12 PER BOX

## (undated) DEVICE — GLOVE SURG SZ 75 L12IN FNGR THK79MIL GRN LTX FREE

## (undated) DEVICE — SUTURE MCRYL + SZ 4-0 L27IN ABSRB UD L19MM PS-2 3/8 CIR MCP426H

## (undated) DEVICE — CONTAINER,SPECIMEN,OR STERILE,4OZ: Brand: MEDLINE